# Patient Record
Sex: MALE | Race: WHITE | NOT HISPANIC OR LATINO | Employment: FULL TIME | URBAN - METROPOLITAN AREA
[De-identification: names, ages, dates, MRNs, and addresses within clinical notes are randomized per-mention and may not be internally consistent; named-entity substitution may affect disease eponyms.]

---

## 2017-01-09 ENCOUNTER — HOSPITAL ENCOUNTER (OUTPATIENT)
Dept: ORTHOPEDIC SURGERY | Facility: CLINIC | Age: 57
Discharge: HOME | End: 2017-01-09
Attending: PODIATRIST | Admitting: PODIATRIST

## 2017-01-16 ENCOUNTER — HOSPITAL ENCOUNTER (OUTPATIENT)
Dept: LAB | Facility: HOSPITAL | Age: 57
Setting detail: SPECIMEN
Discharge: HOME | End: 2017-01-16
Attending: NURSE PRACTITIONER | Admitting: NURSE PRACTITIONER

## 2017-01-16 LAB
ALBUMIN SERPL-MCNC: 3.8 G/DL (ref 3.5–4.8)
ALBUMIN/GLOB SERPL: 1.1 {RATIO} (ref 1–1.7)
ALP SERPL-CCNC: 53 IU/L (ref 32–91)
ALT SERPL-CCNC: 24 IU/L (ref 17–63)
ANION GAP SERPL CALC-SCNC: 11.5 MMOL/L (ref 10–20)
AST SERPL-CCNC: 20 IU/L (ref 15–41)
BILIRUB SERPL-MCNC: 0.6 MG/DL (ref 0.3–1.2)
BUN SERPL-MCNC: 18 MG/DL (ref 8–20)
BUN/CREAT SERPL: 18 (ref 6.2–20.3)
CALCIUM SERPL-MCNC: 9.5 MG/DL (ref 8.9–10.3)
CHLORIDE SERPL-SCNC: 103 MMOL/L (ref 101–111)
CHOLEST SERPL-MCNC: 135 MG/DL
CHOLEST/HDLC SERPL: 3.8 {RATIO}
CONV CO2: 27 MMOL/L (ref 22–32)
CONV LDL CHOLESTEROL DIRECT: 70 MG/DL (ref 0–100)
CONV TOTAL PROTEIN: 7.4 G/DL (ref 6.1–7.9)
CREAT UR-MCNC: 1 MG/DL (ref 0.7–1.2)
GLOBULIN UR ELPH-MCNC: 3.6 G/DL (ref 2.5–3.8)
GLUCOSE SERPL-MCNC: 123 MG/DL (ref 65–99)
HDLC SERPL-MCNC: 35 MG/DL
LDLC/HDLC SERPL: 2 {RATIO}
LIPID INTERPRETATION: ABNORMAL
POTASSIUM SERPL-SCNC: 4.5 MMOL/L (ref 3.6–5.1)
SODIUM SERPL-SCNC: 137 MMOL/L (ref 136–144)
TRIGL SERPL-MCNC: 223 MG/DL
VLDLC SERPL CALC-MCNC: 30 MG/DL

## 2017-01-20 ENCOUNTER — CONVERSION ENCOUNTER (OUTPATIENT)
Dept: CARDIOLOGY | Facility: CLINIC | Age: 57
End: 2017-01-20

## 2017-01-27 ENCOUNTER — CONVERSION ENCOUNTER (OUTPATIENT)
Dept: CARDIOLOGY | Facility: CLINIC | Age: 57
End: 2017-01-27

## 2017-02-06 ENCOUNTER — HOSPITAL ENCOUNTER (OUTPATIENT)
Dept: PAIN MEDICINE | Facility: HOSPITAL | Age: 57
Discharge: HOME | End: 2017-02-06
Attending: ANESTHESIOLOGY | Admitting: ANESTHESIOLOGY

## 2017-02-06 LAB
AMPHETAMINES UR QL SCN: NEGATIVE
BZE UR QL SCN: NEGATIVE
CREAT 24H UR-MCNC: NORMAL MG/DL
METHADONE UR QL SCN: NEGATIVE
OPIATE CONFIRMATION URINE: NORMAL
THC SERPLBLD CFM-MCNC: NEGATIVE NG/ML

## 2017-02-10 ENCOUNTER — HOSPITAL ENCOUNTER (OUTPATIENT)
Dept: FAMILY MEDICINE CLINIC | Facility: CLINIC | Age: 57
Setting detail: SPECIMEN
Discharge: HOME | End: 2017-02-10
Attending: PREVENTIVE MEDICINE | Admitting: PREVENTIVE MEDICINE

## 2017-02-10 LAB
BASOPHILS # BLD AUTO: 0.2 10*3/UL (ref 0–0.2)
BASOPHILS NFR BLD AUTO: 1 % (ref 0–2)
DIFFERENTIAL METHOD BLD: (no result)
EOSINOPHIL # BLD AUTO: 0.1 10*3/UL (ref 0–0.3)
EOSINOPHIL # BLD AUTO: 1 % (ref 0–3)
ERYTHROCYTE [DISTWIDTH] IN BLOOD BY AUTOMATED COUNT: 13.1 % (ref 11.5–14.5)
HCT VFR BLD AUTO: 43.7 % (ref 40–54)
HGB BLD-MCNC: 14.5 G/DL (ref 14–18)
LYMPHOCYTES # BLD AUTO: 6.1 10*3/UL (ref 0.8–4.8)
LYMPHOCYTES NFR BLD AUTO: 34 % (ref 18–42)
MCH RBC QN AUTO: 31.2 PG (ref 26–32)
MCHC RBC AUTO-ENTMCNC: 33.1 G/DL (ref 32–36)
MCV RBC AUTO: 94.5 FL (ref 80–94)
MONOCYTES # BLD AUTO: 2.3 10*3/UL (ref 0.1–1.3)
MONOCYTES NFR BLD AUTO: 13 % (ref 2–11)
NEUTROPHILS # BLD AUTO: 9.4 10*3/UL (ref 2.3–8.6)
NEUTROPHILS NFR BLD AUTO: 51 % (ref 50–75)
NRBC BLD AUTO-RTO: 0 /100{WBCS}
NRBC/RBC NFR BLD MANUAL: 0 10*3/UL
PLATELET # BLD AUTO: 402 10*3/UL (ref 150–450)
PMV BLD AUTO: 7.5 FL (ref 7.4–10.4)
RBC # BLD AUTO: 4.63 10*6/UL (ref 4.6–6)
WBC # BLD AUTO: 18.2 10*3/UL (ref 4.5–11.5)

## 2017-02-13 ENCOUNTER — HOSPITAL ENCOUNTER (OUTPATIENT)
Dept: SLEEP MEDICINE | Facility: HOSPITAL | Age: 57
Discharge: HOME | End: 2017-02-13
Attending: PSYCHIATRY & NEUROLOGY | Admitting: PSYCHIATRY & NEUROLOGY

## 2017-02-20 ENCOUNTER — HOSPITAL ENCOUNTER (OUTPATIENT)
Dept: SLEEP MEDICINE | Facility: HOSPITAL | Age: 57
Discharge: HOME | End: 2017-02-20
Attending: PSYCHIATRY & NEUROLOGY | Admitting: PSYCHIATRY & NEUROLOGY

## 2017-02-28 ENCOUNTER — HOSPITAL ENCOUNTER (OUTPATIENT)
Dept: ONCOLOGY | Facility: CLINIC | Age: 57
Setting detail: SPECIMEN
Discharge: HOME | End: 2017-02-28
Attending: INTERNAL MEDICINE | Admitting: INTERNAL MEDICINE

## 2017-02-28 LAB
BASOPHILS # BLD AUTO: 0.2 10*3/UL (ref 0–0.2)
BASOPHILS NFR BLD AUTO: 1 % (ref 0–2)
DIFFERENTIAL METHOD BLD: (no result)
EOSINOPHIL # BLD AUTO: 0.4 10*3/UL (ref 0–0.3)
EOSINOPHIL # BLD AUTO: 3 % (ref 0–3)
ERYTHROCYTE [DISTWIDTH] IN BLOOD BY AUTOMATED COUNT: 13.6 % (ref 11.5–14.5)
HCT VFR BLD AUTO: 41.1 % (ref 40–54)
HGB BLD-MCNC: 13.7 G/DL (ref 14–18)
LYMPHOCYTES # BLD AUTO: 5.8 10*3/UL (ref 0.8–4.8)
LYMPHOCYTES NFR BLD AUTO: 37 % (ref 18–42)
MCH RBC QN AUTO: 31.6 PG (ref 26–32)
MCHC RBC AUTO-ENTMCNC: 33.4 G/DL (ref 32–36)
MCV RBC AUTO: 94.5 FL (ref 80–94)
MONOCYTES # BLD AUTO: 1.8 10*3/UL (ref 0.1–1.3)
MONOCYTES NFR BLD AUTO: 12 % (ref 2–11)
NEUTROPHILS # BLD AUTO: 7.4 10*3/UL (ref 2.3–8.6)
NEUTROPHILS NFR BLD AUTO: 47 % (ref 50–75)
NRBC BLD AUTO-RTO: 0 /100{WBCS}
NRBC/RBC NFR BLD MANUAL: 0 10*3/UL
PLATELET # BLD AUTO: 425 10*3/UL (ref 150–450)
PMV BLD AUTO: 8.3 FL (ref 7.4–10.4)
RBC # BLD AUTO: 4.35 10*6/UL (ref 4.6–6)
WBC # BLD AUTO: 15.5 10*3/UL (ref 4.5–11.5)

## 2017-03-01 ENCOUNTER — HOSPITAL ENCOUNTER (OUTPATIENT)
Dept: ONCOLOGY | Facility: CLINIC | Age: 57
Discharge: HOME | End: 2017-03-01
Attending: INTERNAL MEDICINE | Admitting: INTERNAL MEDICINE

## 2017-03-06 ENCOUNTER — HOSPITAL ENCOUNTER (OUTPATIENT)
Dept: PAIN MEDICINE | Facility: HOSPITAL | Age: 57
Discharge: HOME | End: 2017-03-06
Attending: ANESTHESIOLOGY | Admitting: ANESTHESIOLOGY

## 2017-03-09 ENCOUNTER — HOSPITAL ENCOUNTER (OUTPATIENT)
Dept: LAB | Facility: HOSPITAL | Age: 57
Discharge: HOME | End: 2017-03-09
Attending: INTERNAL MEDICINE | Admitting: INTERNAL MEDICINE

## 2017-03-09 LAB
LDH SERPL-CCNC: 178 IU/L (ref 98–192)
URATE SERPL-MCNC: 3.8 MG/DL (ref 4.8–8.7)
VIT B12 SERPL-MCNC: 541 PG/ML (ref 180–914)

## 2017-03-10 LAB — LYMPHOMA - CLL SCREEN PNL SPEC FC: NORMAL

## 2017-03-13 ENCOUNTER — HOSPITAL ENCOUNTER (OUTPATIENT)
Dept: ONCOLOGY | Facility: CLINIC | Age: 57
Discharge: HOME | End: 2017-03-13
Attending: INTERNAL MEDICINE | Admitting: INTERNAL MEDICINE

## 2017-03-13 LAB — PSA SERPL-MCNC: 0.6 NG/ML (ref 0–4)

## 2017-03-14 LAB
BCR/ABL1 KINASE DOMAIN MUT ANL BLD/T: NORMAL
JAK2 P.V617F BLD/T QL: NORMAL

## 2017-04-17 ENCOUNTER — HOSPITAL ENCOUNTER (OUTPATIENT)
Dept: LAB | Facility: HOSPITAL | Age: 57
Setting detail: SPECIMEN
Discharge: HOME | End: 2017-04-17
Attending: INTERNAL MEDICINE | Admitting: INTERNAL MEDICINE

## 2017-04-18 ENCOUNTER — HOSPITAL ENCOUNTER (OUTPATIENT)
Dept: LAB | Facility: HOSPITAL | Age: 57
Setting detail: SPECIMEN
Discharge: HOME | End: 2017-04-18
Attending: INTERNAL MEDICINE | Admitting: INTERNAL MEDICINE

## 2017-04-18 LAB
ALBUMIN SERPL-MCNC: 3.5 G/DL (ref 3.5–4.8)
ALBUMIN/GLOB SERPL: 1.1 {RATIO} (ref 1–1.7)
ALP SERPL-CCNC: 67 IU/L (ref 32–91)
ALT SERPL-CCNC: 44 IU/L (ref 17–63)
ANION GAP SERPL CALC-SCNC: 12.9 MMOL/L (ref 10–20)
AST SERPL-CCNC: 39 IU/L (ref 15–41)
BILIRUB SERPL-MCNC: 0.6 MG/DL (ref 0.3–1.2)
BUN SERPL-MCNC: 13 MG/DL (ref 8–20)
BUN/CREAT SERPL: 18.6 (ref 6.2–20.3)
CALCIUM SERPL-MCNC: 9.4 MG/DL (ref 8.9–10.3)
CHLORIDE SERPL-SCNC: 105 MMOL/L (ref 101–111)
CHOLEST SERPL-MCNC: 102 MG/DL
CHOLEST/HDLC SERPL: 4.3 {RATIO}
CONV CO2: 26 MMOL/L (ref 22–32)
CONV LDL CHOLESTEROL DIRECT: 35 MG/DL (ref 0–100)
CONV TOTAL PROTEIN: 6.6 G/DL (ref 6.1–7.9)
CREAT UR-MCNC: 0.7 MG/DL (ref 0.7–1.2)
GLOBULIN UR ELPH-MCNC: 3.1 G/DL (ref 2.5–3.8)
GLUCOSE SERPL-MCNC: 211 MG/DL (ref 65–99)
HDLC SERPL-MCNC: 24 MG/DL
LDLC/HDLC SERPL: 1.5 {RATIO}
LIPID INTERPRETATION: ABNORMAL
POTASSIUM SERPL-SCNC: 3.9 MMOL/L (ref 3.6–5.1)
SODIUM SERPL-SCNC: 140 MMOL/L (ref 136–144)
TRIGL SERPL-MCNC: 345 MG/DL
VLDLC SERPL CALC-MCNC: 43.4 MG/DL

## 2017-07-20 ENCOUNTER — HOSPITAL ENCOUNTER (OUTPATIENT)
Dept: LAB | Facility: HOSPITAL | Age: 57
Setting detail: SPECIMEN
Discharge: HOME | End: 2017-07-20
Attending: INTERNAL MEDICINE | Admitting: INTERNAL MEDICINE

## 2017-07-20 LAB
ALBUMIN SERPL-MCNC: 4 G/DL (ref 3.5–4.8)
ALBUMIN/GLOB SERPL: 1.1 {RATIO} (ref 1–1.7)
ALP SERPL-CCNC: 72 IU/L (ref 32–91)
ALT SERPL-CCNC: 29 IU/L (ref 17–63)
ANION GAP SERPL CALC-SCNC: 12.3 MMOL/L (ref 10–20)
AST SERPL-CCNC: 21 IU/L (ref 15–41)
BILIRUB SERPL-MCNC: 0.7 MG/DL (ref 0.3–1.2)
BUN SERPL-MCNC: 11 MG/DL (ref 8–20)
BUN/CREAT SERPL: 12.2 (ref 6.2–20.3)
CALCIUM SERPL-MCNC: 9.6 MG/DL (ref 8.9–10.3)
CHLORIDE SERPL-SCNC: 104 MMOL/L (ref 101–111)
CHOLEST SERPL-MCNC: 110 MG/DL
CHOLEST/HDLC SERPL: 3.8 {RATIO}
CONV CO2: 24 MMOL/L (ref 22–32)
CONV LDL CHOLESTEROL DIRECT: 49 MG/DL (ref 0–100)
CONV MICROALBUM.,U,RANDOM: 91 MG/L
CONV TOTAL PROTEIN: 7.7 G/DL (ref 6.1–7.9)
CREAT 24H UR-MCNC: 65.8 MG/DL
CREAT UR-MCNC: 0.9 MG/DL (ref 0.7–1.2)
GLOBULIN UR ELPH-MCNC: 3.7 G/DL (ref 2.5–3.8)
GLUCOSE SERPL-MCNC: 187 MG/DL (ref 65–99)
HDLC SERPL-MCNC: 29 MG/DL
LDLC/HDLC SERPL: 1.7 {RATIO}
LIPID INTERPRETATION: ABNORMAL
MICROALBUMIN/CREAT UR: 138.3 UG/MG
POTASSIUM SERPL-SCNC: 4.3 MMOL/L (ref 3.6–5.1)
SODIUM SERPL-SCNC: 136 MMOL/L (ref 136–144)
TRIGL SERPL-MCNC: 224 MG/DL
VLDLC SERPL CALC-MCNC: 31.5 MG/DL

## 2017-09-11 ENCOUNTER — HOSPITAL ENCOUNTER (OUTPATIENT)
Dept: ONCOLOGY | Facility: HOSPITAL | Age: 57
Discharge: HOME | End: 2017-09-11
Attending: INTERNAL MEDICINE | Admitting: INTERNAL MEDICINE

## 2017-09-11 ENCOUNTER — HOSPITAL ENCOUNTER (OUTPATIENT)
Dept: ONCOLOGY | Facility: CLINIC | Age: 57
Setting detail: INFUSION SERIES
Discharge: HOME | End: 2017-09-11
Attending: INTERNAL MEDICINE | Admitting: INTERNAL MEDICINE

## 2017-09-11 ENCOUNTER — CLINICAL SUPPORT (OUTPATIENT)
Dept: ONCOLOGY | Facility: HOSPITAL | Age: 57
End: 2017-09-11

## 2017-09-11 NOTE — PROGRESS NOTES
PATIENTS ONCOLOGY RECORD LOCATED IN [facility]      Subjective     Name:  LOWELL GILL     Date:  2017  Address:  [address] [place] [address] [place] [address]  Home: [phone]  :  1960 AGE:  56 y.o.        RECORDS OBTAINED:  Patients Oncology Record is located in [facility]

## 2017-11-09 ENCOUNTER — HOSPITAL ENCOUNTER (OUTPATIENT)
Dept: FAMILY MEDICINE CLINIC | Facility: CLINIC | Age: 57
Setting detail: SPECIMEN
Discharge: HOME | End: 2017-11-09
Attending: PREVENTIVE MEDICINE | Admitting: PREVENTIVE MEDICINE

## 2017-11-09 LAB
ALBUMIN SERPL-MCNC: 4.1 G/DL (ref 3.5–4.8)
ALBUMIN/GLOB SERPL: 1.1 {RATIO} (ref 1–1.7)
ALP SERPL-CCNC: 68 IU/L (ref 32–91)
ALT SERPL-CCNC: 46 IU/L (ref 17–63)
ANION GAP SERPL CALC-SCNC: 12.5 MMOL/L (ref 10–20)
AST SERPL-CCNC: 48 IU/L (ref 15–41)
BASOPHILS # BLD AUTO: 0.1 10*3/UL (ref 0–0.2)
BASOPHILS NFR BLD AUTO: 1 % (ref 0–2)
BILIRUB SERPL-MCNC: 1.4 MG/DL (ref 0.3–1.2)
BUN SERPL-MCNC: 13 MG/DL (ref 8–20)
BUN/CREAT SERPL: 14.4 (ref 6.2–20.3)
CALCIUM SERPL-MCNC: 9 MG/DL (ref 8.9–10.3)
CHLORIDE SERPL-SCNC: 101 MMOL/L (ref 101–111)
CHOLEST SERPL-MCNC: 118 MG/DL
CHOLEST/HDLC SERPL: 3.8 {RATIO}
CONV CO2: 25 MMOL/L (ref 22–32)
CONV LDL CHOLESTEROL DIRECT: 55 MG/DL (ref 0–100)
CONV MICROALBUM.,U,RANDOM: 34 MG/L
CONV TOTAL PROTEIN: 7.7 G/DL (ref 6.1–7.9)
CREAT 24H UR-MCNC: 76.2 MG/DL
CREAT UR-MCNC: 0.9 MG/DL (ref 0.7–1.2)
CRP SERPL-MCNC: 0.42 MG/DL (ref 0–0.7)
DIFFERENTIAL METHOD BLD: (no result)
EOSINOPHIL # BLD AUTO: 0.3 10*3/UL (ref 0–0.3)
EOSINOPHIL # BLD AUTO: 2 % (ref 0–3)
ERYTHROCYTE [DISTWIDTH] IN BLOOD BY AUTOMATED COUNT: 13.2 % (ref 11.5–14.5)
ERYTHROCYTE [SEDIMENTATION RATE] IN BLOOD BY WESTERGREN METHOD: 7 MM/HR (ref 0–20)
GLOBULIN UR ELPH-MCNC: 3.6 G/DL (ref 2.5–3.8)
GLUCOSE SERPL-MCNC: 114 MG/DL (ref 65–99)
HCT VFR BLD AUTO: 47.1 % (ref 40–54)
HDLC SERPL-MCNC: 31 MG/DL
HGB BLD-MCNC: 15.6 G/DL (ref 14–18)
LDLC/HDLC SERPL: 1.8 {RATIO}
LIPID INTERPRETATION: ABNORMAL
LYMPHOCYTES # BLD AUTO: 4.5 10*3/UL (ref 0.8–4.8)
LYMPHOCYTES NFR BLD AUTO: 32 % (ref 18–42)
MCH RBC QN AUTO: 31.4 PG (ref 26–32)
MCHC RBC AUTO-ENTMCNC: 33.2 G/DL (ref 32–36)
MCV RBC AUTO: 94.6 FL (ref 80–94)
MICROALBUMIN/CREAT UR: 44.6 UG/MG
MONOCYTES # BLD AUTO: 1.7 10*3/UL (ref 0.1–1.3)
MONOCYTES NFR BLD AUTO: 12 % (ref 2–11)
NEUTROPHILS # BLD AUTO: 7.6 10*3/UL (ref 2.3–8.6)
NEUTROPHILS NFR BLD AUTO: 53 % (ref 50–75)
NRBC BLD AUTO-RTO: 0 /100{WBCS}
NRBC/RBC NFR BLD MANUAL: 0 10*3/UL
PLATELET # BLD AUTO: 387 10*3/UL (ref 150–450)
PMV BLD AUTO: 7.6 FL (ref 7.4–10.4)
POTASSIUM SERPL-SCNC: 4.5 MMOL/L (ref 3.6–5.1)
RBC # BLD AUTO: 4.98 10*6/UL (ref 4.6–6)
SODIUM SERPL-SCNC: 134 MMOL/L (ref 136–144)
TRIGL SERPL-MCNC: 233 MG/DL
TSH SERPL-ACNC: 1.13 UIU/ML (ref 0.34–5.6)
VIT B12 SERPL-MCNC: 385 PG/ML (ref 180–914)
VLDLC SERPL CALC-MCNC: 32.1 MG/DL
WBC # BLD AUTO: 14.2 10*3/UL (ref 4.5–11.5)

## 2017-12-13 ENCOUNTER — HOSPITAL ENCOUNTER (OUTPATIENT)
Dept: URGENT CARE | Facility: CLINIC | Age: 57
Discharge: HOME | End: 2017-12-13
Attending: FAMILY MEDICINE | Admitting: FAMILY MEDICINE

## 2018-01-12 ENCOUNTER — HOSPITAL ENCOUNTER (OUTPATIENT)
Dept: URGENT CARE | Facility: CLINIC | Age: 58
Discharge: HOME | End: 2018-01-12
Attending: FAMILY MEDICINE | Admitting: FAMILY MEDICINE

## 2018-01-23 ENCOUNTER — HOSPITAL ENCOUNTER (OUTPATIENT)
Dept: ONCOLOGY | Facility: CLINIC | Age: 58
Setting detail: INFUSION SERIES
Discharge: HOME | End: 2018-01-23
Attending: INTERNAL MEDICINE | Admitting: INTERNAL MEDICINE

## 2018-01-23 ENCOUNTER — CLINICAL SUPPORT (OUTPATIENT)
Dept: ONCOLOGY | Facility: HOSPITAL | Age: 58
End: 2018-01-23

## 2018-01-23 NOTE — PROGRESS NOTES
PATIENTS ONCOLOGY RECORD LOCATED IN [facility]      Subjective     Name:  LOWELL GILL     Date:  2018  Address:  [address] [place] [address] [place] [address]  Home: [phone]  :  1960 AGE:  57 y.o.        RECORDS OBTAINED:  Patients Oncology Record is located in [facility]

## 2018-02-20 ENCOUNTER — HOSPITAL ENCOUNTER (OUTPATIENT)
Dept: ONCOLOGY | Facility: CLINIC | Age: 58
Setting detail: INFUSION SERIES
Discharge: HOME | End: 2018-02-20
Attending: INTERNAL MEDICINE | Admitting: INTERNAL MEDICINE

## 2018-02-20 ENCOUNTER — CLINICAL SUPPORT (OUTPATIENT)
Dept: ONCOLOGY | Facility: HOSPITAL | Age: 58
End: 2018-02-20

## 2018-02-20 NOTE — PROGRESS NOTES
PATIENTS ONCOLOGY RECORD LOCATED IN [facility] [[email or web address]]      Subjective     Name:  LOWELL GILL     Date:  2018  Address:  [address] [place] [address] [place] [address]  Home: [phone]  :  1960 AGE:  57 y.o.        RECORDS OBTAINED:  Patients Oncology Record is located in [facility] [[email or web address]]

## 2018-02-28 ENCOUNTER — HOSPITAL ENCOUNTER (OUTPATIENT)
Dept: CT IMAGING | Facility: HOSPITAL | Age: 58
Discharge: HOME | End: 2018-02-28
Attending: PREVENTIVE MEDICINE | Admitting: PREVENTIVE MEDICINE

## 2018-06-27 ENCOUNTER — HOSPITAL ENCOUNTER (OUTPATIENT)
Dept: FAMILY MEDICINE CLINIC | Facility: CLINIC | Age: 58
Setting detail: SPECIMEN
Discharge: HOME | End: 2018-06-27
Attending: PREVENTIVE MEDICINE | Admitting: PREVENTIVE MEDICINE

## 2018-06-27 LAB
ALBUMIN SERPL-MCNC: 3.8 G/DL (ref 3.5–4.8)
ALBUMIN/GLOB SERPL: 1.1 {RATIO} (ref 1–1.7)
ALP SERPL-CCNC: 85 IU/L (ref 32–91)
ALT SERPL-CCNC: 33 IU/L (ref 17–63)
ANION GAP SERPL CALC-SCNC: 13 MMOL/L (ref 10–20)
AST SERPL-CCNC: 27 IU/L (ref 15–41)
BASOPHILS # BLD AUTO: 0.1 10*3/UL (ref 0–0.2)
BASOPHILS NFR BLD AUTO: 1 % (ref 0–2)
BILIRUB SERPL-MCNC: 0.7 MG/DL (ref 0.3–1.2)
BUN SERPL-MCNC: 11 MG/DL (ref 8–20)
BUN/CREAT SERPL: 12.2 (ref 6.2–20.3)
CALCIUM SERPL-MCNC: 9.2 MG/DL (ref 8.9–10.3)
CHLORIDE SERPL-SCNC: 101 MMOL/L (ref 101–111)
CHOLEST SERPL-MCNC: 142 MG/DL
CHOLEST/HDLC SERPL: 6.1 {RATIO}
CONV CO2: 22 MMOL/L (ref 22–32)
CONV LDL CHOLESTEROL DIRECT: 47 MG/DL (ref 0–100)
CONV TOTAL PROTEIN: 7.3 G/DL (ref 6.1–7.9)
CREAT UR-MCNC: 0.9 MG/DL (ref 0.7–1.2)
CRP SERPL-MCNC: 0.79 MG/DL (ref 0–0.7)
DIFFERENTIAL METHOD BLD: (no result)
EOSINOPHIL # BLD AUTO: 0.2 10*3/UL (ref 0–0.3)
EOSINOPHIL # BLD AUTO: 1 % (ref 0–3)
ERYTHROCYTE [DISTWIDTH] IN BLOOD BY AUTOMATED COUNT: 13.3 % (ref 11.5–14.5)
ERYTHROCYTE [SEDIMENTATION RATE] IN BLOOD BY WESTERGREN METHOD: 17 MM/HR (ref 0–20)
GLOBULIN UR ELPH-MCNC: 3.5 G/DL (ref 2.5–3.8)
GLUCOSE SERPL-MCNC: 347 MG/DL (ref 65–99)
HCT VFR BLD AUTO: 45.9 % (ref 40–54)
HDLC SERPL-MCNC: 23 MG/DL
HGB BLD-MCNC: 15 G/DL (ref 14–18)
LDLC/HDLC SERPL: 2 {RATIO}
LIPID INTERPRETATION: ABNORMAL
LYMPHOCYTES # BLD AUTO: 4.3 10*3/UL (ref 0.8–4.8)
LYMPHOCYTES NFR BLD AUTO: 31 % (ref 18–42)
MAGNESIUM SERPL-MCNC: 1.8 MG/DL (ref 1.8–2.5)
MCH RBC QN AUTO: 31.1 PG (ref 26–32)
MCHC RBC AUTO-ENTMCNC: 32.7 G/DL (ref 32–36)
MCV RBC AUTO: 95.1 FL (ref 80–94)
MONOCYTES # BLD AUTO: 1.8 10*3/UL (ref 0.1–1.3)
MONOCYTES NFR BLD AUTO: 13 % (ref 2–11)
NEUTROPHILS # BLD AUTO: 7.4 10*3/UL (ref 2.3–8.6)
NEUTROPHILS NFR BLD AUTO: 54 % (ref 50–75)
NRBC BLD AUTO-RTO: 0 /100{WBCS}
NRBC/RBC NFR BLD MANUAL: 0 10*3/UL
PLATELET # BLD AUTO: 374 10*3/UL (ref 150–450)
PMV BLD AUTO: 9 FL (ref 7.4–10.4)
POTASSIUM SERPL-SCNC: 4 MMOL/L (ref 3.6–5.1)
RBC # BLD AUTO: 4.83 10*6/UL (ref 4.6–6)
SODIUM SERPL-SCNC: 132 MMOL/L (ref 136–144)
TRIGL SERPL-MCNC: 500 MG/DL
URATE SERPL-MCNC: 5.4 MG/DL (ref 4.8–8.7)
VLDLC SERPL CALC-MCNC: 71.6 MG/DL
WBC # BLD AUTO: 13.8 10*3/UL (ref 4.5–11.5)

## 2018-06-28 LAB
AMYLASE SERPL-CCNC: 82 U/L (ref 36–128)
LIPASE SERPL-CCNC: 67 U/L (ref 22–51)

## 2018-08-07 ENCOUNTER — HOSPITAL ENCOUNTER (OUTPATIENT)
Dept: FAMILY MEDICINE CLINIC | Facility: CLINIC | Age: 58
Setting detail: SPECIMEN
Discharge: HOME | End: 2018-08-07
Attending: PREVENTIVE MEDICINE | Admitting: PREVENTIVE MEDICINE

## 2018-08-10 ENCOUNTER — HOSPITAL ENCOUNTER (OUTPATIENT)
Dept: FAMILY MEDICINE CLINIC | Facility: CLINIC | Age: 58
Setting detail: SPECIMEN
Discharge: HOME | End: 2018-08-10
Attending: PREVENTIVE MEDICINE | Admitting: PREVENTIVE MEDICINE

## 2018-08-10 LAB
BACTERIA SPEC AEROBE CULT: NORMAL
Lab: NORMAL
MICRO REPORT STATUS: NORMAL
SPECIMEN SOURCE: NORMAL

## 2018-11-09 ENCOUNTER — HOSPITAL ENCOUNTER (OUTPATIENT)
Dept: LAB | Facility: HOSPITAL | Age: 58
Setting detail: SPECIMEN
Discharge: HOME | End: 2018-11-09
Attending: INTERNAL MEDICINE | Admitting: INTERNAL MEDICINE

## 2018-11-09 LAB
ALBUMIN SERPL-MCNC: 4.2 G/DL (ref 3.5–4.8)
ALBUMIN/GLOB SERPL: 1.2 {RATIO} (ref 1–1.7)
ALP SERPL-CCNC: 53 IU/L (ref 32–91)
ALT SERPL-CCNC: 29 IU/L (ref 17–63)
ANION GAP SERPL CALC-SCNC: 11.9 MMOL/L (ref 10–20)
AST SERPL-CCNC: 26 IU/L (ref 15–41)
BILIRUB SERPL-MCNC: 0.7 MG/DL (ref 0.3–1.2)
BUN SERPL-MCNC: 16 MG/DL (ref 8–20)
BUN/CREAT SERPL: 14.5 (ref 6.2–20.3)
CALCIUM SERPL-MCNC: 9.8 MG/DL (ref 8.9–10.3)
CHLORIDE SERPL-SCNC: 105 MMOL/L (ref 101–111)
CHOLEST SERPL-MCNC: 132 MG/DL
CHOLEST/HDLC SERPL: 5.2 {RATIO}
CONV CO2: 25 MMOL/L (ref 22–32)
CONV LDL CHOLESTEROL DIRECT: 76 MG/DL (ref 0–100)
CONV MICROALBUM.,U,RANDOM: 27 MG/L
CONV TOTAL PROTEIN: 7.7 G/DL (ref 6.1–7.9)
CREAT 24H UR-MCNC: 36.8 MG/DL
CREAT UR-MCNC: 1.1 MG/DL (ref 0.7–1.2)
GLOBULIN UR ELPH-MCNC: 3.5 G/DL (ref 2.5–3.8)
GLUCOSE SERPL-MCNC: 191 MG/DL (ref 65–99)
HDLC SERPL-MCNC: 26 MG/DL
LDLC/HDLC SERPL: 3 {RATIO}
LIPID INTERPRETATION: ABNORMAL
MICROALBUMIN/CREAT UR: 73.4 UG/MG
POTASSIUM SERPL-SCNC: 3.9 MMOL/L (ref 3.6–5.1)
SODIUM SERPL-SCNC: 138 MMOL/L (ref 136–144)
TRIGL SERPL-MCNC: 236 MG/DL
VLDLC SERPL CALC-MCNC: 30.4 MG/DL

## 2019-02-08 ENCOUNTER — HOSPITAL ENCOUNTER (OUTPATIENT)
Dept: LAB | Facility: HOSPITAL | Age: 59
Setting detail: SPECIMEN
Discharge: HOME | End: 2019-02-08
Attending: NURSE PRACTITIONER | Admitting: NURSE PRACTITIONER

## 2019-02-08 LAB
ALBUMIN SERPL-MCNC: 4.1 G/DL (ref 3.5–4.8)
ALBUMIN/GLOB SERPL: 1.1 {RATIO} (ref 1–1.7)
ALP SERPL-CCNC: 50 IU/L (ref 32–91)
ALT SERPL-CCNC: 28 IU/L (ref 17–63)
ANION GAP SERPL CALC-SCNC: 12.1 MMOL/L (ref 10–20)
AST SERPL-CCNC: 23 IU/L (ref 15–41)
BILIRUB SERPL-MCNC: 0.7 MG/DL (ref 0.3–1.2)
BUN SERPL-MCNC: 16 MG/DL (ref 8–20)
BUN/CREAT SERPL: 16 (ref 6.2–20.3)
CALCIUM SERPL-MCNC: 9.2 MG/DL (ref 8.9–10.3)
CHLORIDE SERPL-SCNC: 102 MMOL/L (ref 101–111)
CONV CO2: 25 MMOL/L (ref 22–32)
CONV TOTAL PROTEIN: 7.9 G/DL (ref 6.1–7.9)
CREAT UR-MCNC: 1 MG/DL (ref 0.7–1.2)
GLOBULIN UR ELPH-MCNC: 3.8 G/DL (ref 2.5–3.8)
GLUCOSE SERPL-MCNC: 206 MG/DL (ref 65–99)
POTASSIUM SERPL-SCNC: 4.1 MMOL/L (ref 3.6–5.1)
SODIUM SERPL-SCNC: 135 MMOL/L (ref 136–144)

## 2019-02-28 ENCOUNTER — HOSPITAL ENCOUNTER (OUTPATIENT)
Dept: PHYSICAL THERAPY | Facility: HOSPITAL | Age: 59
Setting detail: RECURRING SERIES
Discharge: HOME | End: 2019-06-14
Attending: ORTHOPAEDIC SURGERY | Admitting: ORTHOPAEDIC SURGERY

## 2019-05-03 ENCOUNTER — HOSPITAL ENCOUNTER (OUTPATIENT)
Dept: LAB | Facility: HOSPITAL | Age: 59
Setting detail: SPECIMEN
Discharge: HOME | End: 2019-05-03
Attending: INTERNAL MEDICINE | Admitting: INTERNAL MEDICINE

## 2019-05-13 ENCOUNTER — HOSPITAL ENCOUNTER (OUTPATIENT)
Dept: ONCOLOGY | Facility: CLINIC | Age: 59
Setting detail: INFUSION SERIES
Discharge: HOME | End: 2019-05-13
Attending: INTERNAL MEDICINE | Admitting: INTERNAL MEDICINE

## 2019-05-13 ENCOUNTER — CLINICAL SUPPORT (OUTPATIENT)
Dept: ONCOLOGY | Facility: HOSPITAL | Age: 59
End: 2019-05-13

## 2019-05-13 NOTE — PROGRESS NOTES
PATIENTS ONCOLOGY RECORD LOCATED IN [facility]      Subjective     Name:  LOWELL GILL     Date:  2019  Address:  [address] [place] [address] [place] [address]  Home: [unfilled]  :  1960 AGE:  58 y.o.        RECORDS OBTAINED:  Patients Oncology Record is located in [facility]

## 2019-06-04 VITALS
DIASTOLIC BLOOD PRESSURE: 75 MMHG | HEART RATE: 82 BPM | SYSTOLIC BLOOD PRESSURE: 142 MMHG | DIASTOLIC BLOOD PRESSURE: 72 MMHG | HEART RATE: 70 BPM | WEIGHT: 221.5 LBS | SYSTOLIC BLOOD PRESSURE: 158 MMHG | WEIGHT: 219.38 LBS

## 2019-06-21 RX ORDER — LOSARTAN POTASSIUM 100 MG/1
TABLET ORAL
Qty: 90 TABLET | Refills: 1 | Status: SHIPPED | OUTPATIENT
Start: 2019-06-21 | End: 2019-07-22

## 2019-06-21 RX ORDER — LOSARTAN POTASSIUM 100 MG/1
TABLET ORAL EVERY 24 HOURS
COMMUNITY
Start: 2014-12-13 | End: 2019-06-21 | Stop reason: SDUPTHER

## 2019-07-02 PROBLEM — I25.119 CORONARY ARTERY DISEASE WITH ANGINA PECTORIS (HCC): Status: ACTIVE | Noted: 2019-07-02

## 2019-07-02 PROBLEM — I10 ESSENTIAL HYPERTENSION: Status: ACTIVE | Noted: 2019-07-02

## 2019-07-02 PROBLEM — R06.02 SOB (SHORTNESS OF BREATH): Status: ACTIVE | Noted: 2019-07-02

## 2019-07-02 PROBLEM — R07.9 CHEST PAIN: Status: ACTIVE | Noted: 2019-07-02

## 2019-07-22 ENCOUNTER — APPOINTMENT (OUTPATIENT)
Dept: GENERAL RADIOLOGY | Facility: HOSPITAL | Age: 59
End: 2019-07-22

## 2019-07-22 ENCOUNTER — HOSPITAL ENCOUNTER (OUTPATIENT)
Facility: HOSPITAL | Age: 59
Setting detail: OBSERVATION
Discharge: HOME | End: 2019-07-23
Attending: EMERGENCY MEDICINE | Admitting: INTERNAL MEDICINE

## 2019-07-22 DIAGNOSIS — J18.9 PNEUMONIA OF RIGHT LOWER LOBE DUE TO INFECTIOUS ORGANISM: Primary | ICD-10-CM

## 2019-07-22 DIAGNOSIS — R09.02 HYPOXEMIA: ICD-10-CM

## 2019-07-22 PROBLEM — E78.5 HYPERLIPIDEMIA: Chronic | Status: ACTIVE | Noted: 2019-07-22

## 2019-07-22 PROBLEM — G89.29 CHRONIC BACK PAIN: Chronic | Status: ACTIVE | Noted: 2019-07-22

## 2019-07-22 PROBLEM — M54.9 CHRONIC BACK PAIN: Chronic | Status: ACTIVE | Noted: 2019-07-22

## 2019-07-22 PROBLEM — G47.33 OSA (OBSTRUCTIVE SLEEP APNEA): Chronic | Status: ACTIVE | Noted: 2019-07-22

## 2019-07-22 PROBLEM — E11.9 TYPE 2 DIABETES MELLITUS: Chronic | Status: ACTIVE | Noted: 2019-07-22

## 2019-07-22 PROBLEM — M10.9 GOUT: Chronic | Status: ACTIVE | Noted: 2019-07-22

## 2019-07-22 LAB
BASOPHILS # BLD AUTO: 0.2 10*3/MM3 (ref 0–0.2)
BASOPHILS NFR BLD AUTO: 1.4 % (ref 0–1.5)
D-LACTATE SERPL-SCNC: 1.5 MMOL/L (ref 0.5–2)
DEPRECATED RDW RBC AUTO: 44.2 FL (ref 37–54)
EOSINOPHIL # BLD AUTO: 0.2 10*3/MM3 (ref 0–0.4)
EOSINOPHIL NFR BLD AUTO: 2 % (ref 0.3–6.2)
ERYTHROCYTE [DISTWIDTH] IN BLOOD BY AUTOMATED COUNT: 13.4 % (ref 12.3–15.4)
GLUCOSE BLDC GLUCOMTR-MCNC: 202 MG/DL (ref 70–105)
GLUCOSE BLDC GLUCOMTR-MCNC: 340 MG/DL (ref 70–105)
HCT VFR BLD AUTO: 46 % (ref 37.5–51)
HGB BLD-MCNC: 15.2 G/DL (ref 13–17.7)
L PNEUMO1 AG UR QL IA: NEGATIVE
LYMPHOCYTES # BLD AUTO: 4.1 10*3/MM3 (ref 0.7–3.1)
LYMPHOCYTES NFR BLD AUTO: 35.9 % (ref 19.6–45.3)
MCH RBC QN AUTO: 31.1 PG (ref 26.6–33)
MCHC RBC AUTO-ENTMCNC: 33.1 G/DL (ref 31.5–35.7)
MCV RBC AUTO: 93.9 FL (ref 79–97)
MONOCYTES # BLD AUTO: 2.1 10*3/MM3 (ref 0.1–0.9)
MONOCYTES NFR BLD AUTO: 18.6 % (ref 5–12)
NEUTROPHILS # BLD AUTO: 4.9 10*3/MM3 (ref 1.7–7)
NEUTROPHILS NFR BLD AUTO: 42.1 % (ref 42.7–76)
NRBC BLD AUTO-RTO: 0.3 /100 WBC (ref 0–0.2)
PLATELET # BLD AUTO: 397 10*3/MM3 (ref 140–450)
PMV BLD AUTO: 7.5 FL (ref 6–12)
RBC # BLD AUTO: 4.9 10*6/MM3 (ref 4.14–5.8)
S PNEUM AG SPEC QL LA: NEGATIVE
TROPONIN I SERPL-MCNC: <0.03 NG/ML (ref 0–0.03)
WBC NRBC COR # BLD: 11.5 10*3/MM3 (ref 3.4–10.8)

## 2019-07-22 PROCEDURE — 83605 ASSAY OF LACTIC ACID: CPT

## 2019-07-22 PROCEDURE — G0378 HOSPITAL OBSERVATION PER HR: HCPCS

## 2019-07-22 PROCEDURE — 94640 AIRWAY INHALATION TREATMENT: CPT

## 2019-07-22 PROCEDURE — 25010000002 METHYLPREDNISOLONE PER 125 MG: Performed by: EMERGENCY MEDICINE

## 2019-07-22 PROCEDURE — 87899 AGENT NOS ASSAY W/OPTIC: CPT | Performed by: NURSE PRACTITIONER

## 2019-07-22 PROCEDURE — 85025 COMPLETE CBC W/AUTO DIFF WBC: CPT | Performed by: EMERGENCY MEDICINE

## 2019-07-22 PROCEDURE — 99219 PR INITIAL OBSERVATION CARE/DAY 50 MINUTES: CPT | Performed by: NURSE PRACTITIONER

## 2019-07-22 PROCEDURE — 96365 THER/PROPH/DIAG IV INF INIT: CPT

## 2019-07-22 PROCEDURE — 25010000002 METHYLPREDNISOLONE PER 40 MG: Performed by: NURSE PRACTITIONER

## 2019-07-22 PROCEDURE — 25010000002 CEFTRIAXONE PER 250 MG: Performed by: EMERGENCY MEDICINE

## 2019-07-22 PROCEDURE — 82962 GLUCOSE BLOOD TEST: CPT

## 2019-07-22 PROCEDURE — 94799 UNLISTED PULMONARY SVC/PX: CPT

## 2019-07-22 PROCEDURE — 83036 HEMOGLOBIN GLYCOSYLATED A1C: CPT | Performed by: NURSE PRACTITIONER

## 2019-07-22 PROCEDURE — 96376 TX/PRO/DX INJ SAME DRUG ADON: CPT

## 2019-07-22 PROCEDURE — 63710000001 INSULIN LISPRO (HUMAN) PER 5 UNITS: Performed by: NURSE PRACTITIONER

## 2019-07-22 PROCEDURE — 84484 ASSAY OF TROPONIN QUANT: CPT | Performed by: EMERGENCY MEDICINE

## 2019-07-22 PROCEDURE — 96375 TX/PRO/DX INJ NEW DRUG ADDON: CPT

## 2019-07-22 PROCEDURE — 99284 EMERGENCY DEPT VISIT MOD MDM: CPT

## 2019-07-22 PROCEDURE — 71046 X-RAY EXAM CHEST 2 VIEWS: CPT

## 2019-07-22 PROCEDURE — 87040 BLOOD CULTURE FOR BACTERIA: CPT | Performed by: EMERGENCY MEDICINE

## 2019-07-22 RX ORDER — LOSARTAN POTASSIUM 50 MG/1
100 TABLET ORAL DAILY
Status: DISCONTINUED | OUTPATIENT
Start: 2019-07-23 | End: 2019-07-23 | Stop reason: HOSPADM

## 2019-07-22 RX ORDER — GLYBURIDE 5 MG/1
10 TABLET ORAL 2 TIMES DAILY
Refills: 3 | COMMUNITY
Start: 2019-06-06 | End: 2020-03-31 | Stop reason: SDUPTHER

## 2019-07-22 RX ORDER — IPRATROPIUM BROMIDE AND ALBUTEROL SULFATE 2.5; .5 MG/3ML; MG/3ML
3 SOLUTION RESPIRATORY (INHALATION) ONCE
Status: COMPLETED | OUTPATIENT
Start: 2019-07-22 | End: 2019-07-22

## 2019-07-22 RX ORDER — PANTOPRAZOLE SODIUM 40 MG/1
40 TABLET, DELAYED RELEASE ORAL DAILY
Status: DISCONTINUED | OUTPATIENT
Start: 2019-07-23 | End: 2019-07-23 | Stop reason: HOSPADM

## 2019-07-22 RX ORDER — ONDANSETRON 4 MG/1
4 TABLET, FILM COATED ORAL EVERY 6 HOURS PRN
Status: DISCONTINUED | OUTPATIENT
Start: 2019-07-22 | End: 2019-07-23 | Stop reason: HOSPADM

## 2019-07-22 RX ORDER — LOSARTAN POTASSIUM 50 MG/1
100 TABLET ORAL DAILY
COMMUNITY
End: 2020-01-20

## 2019-07-22 RX ORDER — BISACODYL 5 MG/1
5 TABLET, DELAYED RELEASE ORAL DAILY PRN
Status: DISCONTINUED | OUTPATIENT
Start: 2019-07-22 | End: 2019-07-23 | Stop reason: HOSPADM

## 2019-07-22 RX ORDER — SODIUM CHLORIDE 0.9 % (FLUSH) 0.9 %
3-10 SYRINGE (ML) INJECTION AS NEEDED
Status: DISCONTINUED | OUTPATIENT
Start: 2019-07-22 | End: 2019-07-23 | Stop reason: HOSPADM

## 2019-07-22 RX ORDER — OXYCODONE HYDROCHLORIDE 10 MG/1
10 TABLET ORAL EVERY 6 HOURS PRN
Refills: 0 | COMMUNITY
Start: 2019-05-29 | End: 2020-09-02

## 2019-07-22 RX ORDER — PIOGLITAZONEHYDROCHLORIDE 30 MG/1
15 TABLET ORAL DAILY
Status: DISCONTINUED | OUTPATIENT
Start: 2019-07-23 | End: 2019-07-23 | Stop reason: HOSPADM

## 2019-07-22 RX ORDER — OXYCODONE HYDROCHLORIDE 5 MG/1
10 TABLET ORAL EVERY 6 HOURS PRN
Status: DISCONTINUED | OUTPATIENT
Start: 2019-07-22 | End: 2019-07-23 | Stop reason: HOSPADM

## 2019-07-22 RX ORDER — GLIPIZIDE 5 MG/1
5 TABLET ORAL
Status: DISCONTINUED | OUTPATIENT
Start: 2019-07-23 | End: 2019-07-23 | Stop reason: HOSPADM

## 2019-07-22 RX ORDER — SODIUM CHLORIDE 0.9 % (FLUSH) 0.9 %
3 SYRINGE (ML) INJECTION EVERY 12 HOURS SCHEDULED
Status: DISCONTINUED | OUTPATIENT
Start: 2019-07-22 | End: 2019-07-23 | Stop reason: HOSPADM

## 2019-07-22 RX ORDER — METHYLPREDNISOLONE SODIUM SUCCINATE 40 MG/ML
40 INJECTION, POWDER, LYOPHILIZED, FOR SOLUTION INTRAMUSCULAR; INTRAVENOUS EVERY 6 HOURS
Status: DISCONTINUED | OUTPATIENT
Start: 2019-07-22 | End: 2019-07-23 | Stop reason: HOSPADM

## 2019-07-22 RX ORDER — ATORVASTATIN CALCIUM 20 MG/1
20 TABLET, FILM COATED ORAL DAILY
Refills: 5 | COMMUNITY
Start: 2019-06-21 | End: 2019-12-18

## 2019-07-22 RX ORDER — NICOTINE 21 MG/24HR
1 PATCH, TRANSDERMAL 24 HOURS TRANSDERMAL EVERY 24 HOURS
Status: DISCONTINUED | OUTPATIENT
Start: 2019-07-22 | End: 2019-07-22

## 2019-07-22 RX ORDER — PREGABALIN 100 MG/1
100 CAPSULE ORAL 2 TIMES DAILY PRN
Status: DISCONTINUED | OUTPATIENT
Start: 2019-07-22 | End: 2019-07-23 | Stop reason: HOSPADM

## 2019-07-22 RX ORDER — ATORVASTATIN CALCIUM 20 MG/1
20 TABLET, FILM COATED ORAL DAILY
Status: DISCONTINUED | OUTPATIENT
Start: 2019-07-23 | End: 2019-07-23 | Stop reason: HOSPADM

## 2019-07-22 RX ORDER — EMPAGLIFLOZIN 25 MG/1
25 TABLET, FILM COATED ORAL DAILY
Refills: 6 | COMMUNITY
Start: 2019-06-13 | End: 2020-02-10

## 2019-07-22 RX ORDER — DILTIAZEM HYDROCHLORIDE 240 MG/1
240 CAPSULE, EXTENDED RELEASE ORAL DAILY
Refills: 3 | COMMUNITY
Start: 2019-06-11 | End: 2020-03-16

## 2019-07-22 RX ORDER — AZITHROMYCIN 250 MG/1
250 TABLET, FILM COATED ORAL DAILY
COMMUNITY
Start: 2019-07-20 | End: 2019-07-23 | Stop reason: HOSPADM

## 2019-07-22 RX ORDER — MONTELUKAST SODIUM 10 MG/1
10 TABLET ORAL DAILY
Status: DISCONTINUED | OUTPATIENT
Start: 2019-07-23 | End: 2019-07-23 | Stop reason: HOSPADM

## 2019-07-22 RX ORDER — NICOTINE POLACRILEX 4 MG
15 LOZENGE BUCCAL
Status: DISCONTINUED | OUTPATIENT
Start: 2019-07-22 | End: 2019-07-23 | Stop reason: HOSPADM

## 2019-07-22 RX ORDER — HYDROCHLOROTHIAZIDE 12.5 MG/1
12.5 CAPSULE, GELATIN COATED ORAL DAILY
Refills: 3 | COMMUNITY
Start: 2019-06-25 | End: 2019-07-31 | Stop reason: SDUPTHER

## 2019-07-22 RX ORDER — ACETAMINOPHEN 160 MG/5ML
650 SOLUTION ORAL EVERY 4 HOURS PRN
Status: DISCONTINUED | OUTPATIENT
Start: 2019-07-22 | End: 2019-07-23 | Stop reason: HOSPADM

## 2019-07-22 RX ORDER — METFORMIN HYDROCHLORIDE 500 MG/1
1000 TABLET, EXTENDED RELEASE ORAL 2 TIMES DAILY
Refills: 6 | COMMUNITY
Start: 2019-06-06 | End: 2020-06-22

## 2019-07-22 RX ORDER — DEXTROSE MONOHYDRATE 25 G/50ML
25 INJECTION, SOLUTION INTRAVENOUS
Status: DISCONTINUED | OUTPATIENT
Start: 2019-07-22 | End: 2019-07-23 | Stop reason: HOSPADM

## 2019-07-22 RX ORDER — ONDANSETRON 2 MG/ML
4 INJECTION INTRAMUSCULAR; INTRAVENOUS EVERY 6 HOURS PRN
Status: DISCONTINUED | OUTPATIENT
Start: 2019-07-22 | End: 2019-07-23 | Stop reason: HOSPADM

## 2019-07-22 RX ORDER — METHYLPREDNISOLONE SODIUM SUCCINATE 125 MG/2ML
80 INJECTION, POWDER, LYOPHILIZED, FOR SOLUTION INTRAMUSCULAR; INTRAVENOUS ONCE
Status: COMPLETED | OUTPATIENT
Start: 2019-07-22 | End: 2019-07-22

## 2019-07-22 RX ORDER — MONTELUKAST SODIUM 10 MG/1
10 TABLET ORAL DAILY
Refills: 5 | COMMUNITY
Start: 2019-06-06 | End: 2019-08-21

## 2019-07-22 RX ORDER — PIOGLITAZONEHYDROCHLORIDE 15 MG/1
15 TABLET ORAL DAILY
Refills: 5 | COMMUNITY
Start: 2019-06-21 | End: 2020-01-20

## 2019-07-22 RX ORDER — BISACODYL 10 MG
10 SUPPOSITORY, RECTAL RECTAL DAILY PRN
Status: DISCONTINUED | OUTPATIENT
Start: 2019-07-22 | End: 2019-07-23 | Stop reason: HOSPADM

## 2019-07-22 RX ORDER — HYDROCHLOROTHIAZIDE 12.5 MG/1
12.5 TABLET ORAL DAILY
Status: DISCONTINUED | OUTPATIENT
Start: 2019-07-23 | End: 2019-07-23 | Stop reason: HOSPADM

## 2019-07-22 RX ORDER — FENOFIBRATE 145 MG/1
145 TABLET, COATED ORAL DAILY
Status: DISCONTINUED | OUTPATIENT
Start: 2019-07-23 | End: 2019-07-23 | Stop reason: HOSPADM

## 2019-07-22 RX ORDER — DILTIAZEM HYDROCHLORIDE 240 MG/1
240 CAPSULE, COATED, EXTENDED RELEASE ORAL DAILY
Status: DISCONTINUED | OUTPATIENT
Start: 2019-07-23 | End: 2019-07-23 | Stop reason: HOSPADM

## 2019-07-22 RX ORDER — ASPIRIN 81 MG/1
81 TABLET ORAL DAILY
Status: DISCONTINUED | OUTPATIENT
Start: 2019-07-23 | End: 2019-07-23 | Stop reason: HOSPADM

## 2019-07-22 RX ORDER — PANTOPRAZOLE SODIUM 40 MG/1
40 TABLET, DELAYED RELEASE ORAL DAILY
Refills: 6 | COMMUNITY
Start: 2019-06-25 | End: 2019-08-21

## 2019-07-22 RX ORDER — FENOFIBRATE 134 MG/1
134 CAPSULE ORAL DAILY
Refills: 3 | COMMUNITY
Start: 2019-06-21 | End: 2019-07-31 | Stop reason: SDUPTHER

## 2019-07-22 RX ORDER — ACETAMINOPHEN 650 MG/1
650 SUPPOSITORY RECTAL EVERY 4 HOURS PRN
Status: DISCONTINUED | OUTPATIENT
Start: 2019-07-22 | End: 2019-07-23 | Stop reason: HOSPADM

## 2019-07-22 RX ORDER — IPRATROPIUM BROMIDE AND ALBUTEROL SULFATE 2.5; .5 MG/3ML; MG/3ML
3 SOLUTION RESPIRATORY (INHALATION)
Status: DISCONTINUED | OUTPATIENT
Start: 2019-07-22 | End: 2019-07-23 | Stop reason: HOSPADM

## 2019-07-22 RX ORDER — ACETAMINOPHEN 325 MG/1
650 TABLET ORAL EVERY 4 HOURS PRN
Status: DISCONTINUED | OUTPATIENT
Start: 2019-07-22 | End: 2019-07-23 | Stop reason: HOSPADM

## 2019-07-22 RX ORDER — ASPIRIN 81 MG/1
81 TABLET ORAL 3 TIMES WEEKLY
COMMUNITY

## 2019-07-22 RX ADMIN — IPRATROPIUM BROMIDE AND ALBUTEROL SULFATE 3 ML: .5; 3 SOLUTION RESPIRATORY (INHALATION) at 13:35

## 2019-07-22 RX ADMIN — INSULIN LISPRO 3 UNITS: 100 INJECTION, SOLUTION INTRAVENOUS; SUBCUTANEOUS at 17:43

## 2019-07-22 RX ADMIN — IPRATROPIUM BROMIDE AND ALBUTEROL SULFATE 3 ML: .5; 3 SOLUTION RESPIRATORY (INHALATION) at 13:29

## 2019-07-22 RX ADMIN — METHYLPREDNISOLONE SODIUM SUCCINATE 40 MG: 40 INJECTION, POWDER, FOR SOLUTION INTRAMUSCULAR; INTRAVENOUS at 18:37

## 2019-07-22 RX ADMIN — Medication 10 ML: at 21:09

## 2019-07-22 RX ADMIN — INSULIN LISPRO 5 UNITS: 100 INJECTION, SOLUTION INTRAVENOUS; SUBCUTANEOUS at 21:06

## 2019-07-22 RX ADMIN — METHYLPREDNISOLONE SODIUM SUCCINATE 80 MG: 125 INJECTION, POWDER, FOR SOLUTION INTRAMUSCULAR; INTRAVENOUS at 13:26

## 2019-07-22 RX ADMIN — IPRATROPIUM BROMIDE AND ALBUTEROL SULFATE 3 ML: .5; 3 SOLUTION RESPIRATORY (INHALATION) at 20:49

## 2019-07-22 RX ADMIN — DOXYCYCLINE 100 MG: 100 INJECTION, POWDER, LYOPHILIZED, FOR SOLUTION INTRAVENOUS at 15:53

## 2019-07-22 RX ADMIN — CEFTRIAXONE SODIUM 1 G: 10 INJECTION, POWDER, FOR SOLUTION INTRAVENOUS at 15:52

## 2019-07-22 RX ADMIN — CEFTRIAXONE SODIUM 1 G: 10 INJECTION, POWDER, FOR SOLUTION INTRAVENOUS at 13:26
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
## 2019-07-22 NOTE — ED PROVIDER NOTES
Subjective   58-year-old complaining of increasing shortness of breath in the last 3 days.  The patient has had fever and chills over the last 36 hours.  He reports that he has had a cough productive of green sputum.  He reports no blood.  He reports no leg pain or swelling.  He denies night sweats or hemoptysis.  He reports that he has had a little nausea but no actual vomiting or diarrhea is been able to tolerate oral liquids without difficulty.  He states he has a history of sleep apnea            Review of Systems   Constitutional: Positive for diaphoresis, fatigue and fever.   HENT: Negative for sinus pressure and sinus pain.    Eyes: Negative for discharge.   Respiratory: Positive for chest tightness, shortness of breath and wheezing. Negative for cough, choking and stridor.    Cardiovascular: Positive for palpitations. Negative for leg swelling.   Gastrointestinal: Negative for abdominal pain.   Genitourinary: Negative for dysuria and urgency.   Musculoskeletal: Positive for back pain.   Neurological: Negative for weakness and headaches.   Hematological: Bruises/bleeds easily.   All other systems reviewed and are negative.      Past Medical History:   Diagnosis Date   • Back pain    • Broken finger     broken middle finger   • Diabetes mellitus, type 2 (CMS/HCC)    • Gout    • Hand lesion    • Heart attack (CMS/HCC) 2017   • Hx of migraines    • Hyperlipidemia    • Hypertension    • MELISSA (obstructive sleep apnea) 2017    treated with cpap in past, npsg 2017, ahi 33    • Voice absence     Losing Voice        Allergies   Allergen Reactions   • Gabapentin Dizziness       Past Surgical History:   Procedure Laterality Date   • ANKLE SURGERY Left 2001   • CARDIAC CATHETERIZATION  2017   • CARPAL TUNNEL RELEASE Bilateral    • CATARACT EXTRACTION  2015   • HERNIA REPAIR     • OTHER SURGICAL HISTORY  2017    MI with Stent 2010, 2017    • SPLENECTOMY         Family History   Problem Relation Age of Onset   • Diabetes  Paternal Grandmother    • Cancer Other        Social History     Socioeconomic History   • Marital status:      Spouse name: Not on file   • Number of children: Not on file   • Years of education: Not on file   • Highest education level: Not on file   Tobacco Use   • Smoking status: Former Smoker   Substance and Sexual Activity   • Alcohol use: No     Frequency: Never   • Drug use: No           Objective   Physical Exam moderate story distress and had O2 saturations in the 88 range on room air  Alert [place] Coma Scale 15   HEENT: Pupils equal and reactive to light. Conjunctivae are not injected. normal tympanic membranes. Oropharynx and nares are normal.   Neck: Supple. Midline trachea. No JVD. No goiter.   Chest: There are extensive wheezes noted bilaterally there is rales identified in the right lower lobe distribution and equal breath sounds bilaterally regular rate and rhythm without murmur or rub.   Abdomen: Positive bowel sounds nontender nondistended. No rebound or peritoneal signs. No CVA tenderness.   Extremities no clubbing cyanosis or edema motor sensory exam is normal the full range of motion is intact   skin: Warm and dry, no rashes or petechia.   Lymphatic: No regional lymphadenopathy. No calf pain, swelling or Kami's sign    Procedures           ED Course          Labs Reviewed   CBC WITH AUTO DIFFERENTIAL - Abnormal; Notable for the following components:       Result Value    WBC 11.50 (*)     Neutrophil % 42.1 (*)     Monocyte % 18.6 (*)     Lymphocytes, Absolute 4.10 (*)     Monocytes, Absolute 2.10 (*)     nRBC 0.3 (*)     All other components within normal limits   TROPONIN (IN-HOUSE) - Normal    Narrative:     Troponin I Reference Range:    0.00-0.03  Negative.  Repeat testing in 4-6 hours if clinically indicated.    0.04-0.29  Suspicious for myocardial injury. Serial measurements and clinical  correlation may be necessary to confirm or exclude diagnosis of acute  coronary syndrome.   Repeat testing in 4-6 hours if indicated.     >0.29 Consistent with myocardial injury.  Recommend clinical and laboratory correlation.     Results my be falsely decreased if patient taking Biotin.    POC LACTATE - Normal   BLOOD CULTURE   BLOOD CULTURE   LACTIC ACID, PLASMA   CBC AND DIFFERENTIAL    Narrative:     The following orders were created for panel order CBC & Differential.  Procedure                               Abnormality         Status                     ---------                               -----------         ------                     CBC Auto Differential[[id number]]        Abnormal            Final result                 Please view results for these tests on the individual orders.     Medications   cefTRIAXone (ROCEPHIN) in SWFI 1 gram/10ml IV PUSH syringe (not administered)   doxycycline (VIBRAMYCIN) 100 mg in sodium chloride 0.9 % 100 mL IVPB (not administered)   ipratropium-albuterol (DUO-NEB) nebulizer solution 3 mL (3 mL Nebulization Given 7/22/19 1335)   methylPREDNISolone sodium succinate (SOLU-Medrol) injection 80 mg (80 mg Intravenous Given 7/22/19 1326)   cefTRIAXone (ROCEPHIN) in SWFI 1 gram/10ml IV PUSH syringe (1 g Intravenous Given 7/22/19 1326)   ipratropium-albuterol (DUO-NEB) nebulizer solution 3 mL (3 mL Nebulization Given 7/22/19 1329)     Xr Chest 2 View    Result Date: 7/22/2019  New medial right basilar right cardiophrenic angle opacity, since December 2017. Correlate clinically for pneumonia. Short-term radiographic follow-up to document resolution is recommended, to exclude presence of underlying mass lesion..  Electronically Signed By-Dr. Tabitha Gallegos MD On:7/22/2019 1:18 PM This report was finalized on [id number] by Dr. Tabitha Gallegos MD.            MDM  Number of Diagnoses or Management Options     Amount and/or Complexity of Data Reviewed  Clinical lab tests: reviewed  Tests in the radiology section of CPT®: reviewed  Obtain history from someone other than the  patient: yes  Review and summarize past medical records: yes  Discuss the patient with other providers: yes  Independent visualization of images, tracings, or specimens: yes    Risk of Complications, Morbidity, and/or Mortality  Presenting problems: high  Diagnostic procedures: high  Management options: high  General comments: The patient felt better with ER therapy.  Cultures were obtained.  Lactic acid was normal.  O2 saturation improved into the low to mid 90s on oxygen supplementation.  The patient was agreeable to hospitalization in fact stated he felt too sick to go home    Patient Progress  Patient progress: improved        Final diagnoses:   Pneumonia of right lower lobe due to infectious organism (CMS/[facility])   Hypoxemia            Brayden Bowie MD  07/22/19 9040

## 2019-07-23 ENCOUNTER — TRANSITIONAL CARE MANAGEMENT TELEPHONE ENCOUNTER (OUTPATIENT)
Dept: FAMILY MEDICINE CLINIC | Facility: CLINIC | Age: 59
End: 2019-07-23

## 2019-07-23 VITALS
RESPIRATION RATE: 18 BRPM | OXYGEN SATURATION: 95 % | WEIGHT: 210.54 LBS | SYSTOLIC BLOOD PRESSURE: 126 MMHG | HEART RATE: 76 BPM | DIASTOLIC BLOOD PRESSURE: 73 MMHG | BODY MASS INDEX: 33.84 KG/M2 | HEIGHT: 66 IN | TEMPERATURE: 97.4 F

## 2019-07-23 LAB
ANION GAP SERPL CALCULATED.3IONS-SCNC: 17.3 MMOL/L (ref 5–15)
BASOPHILS # BLD AUTO: 0 10*3/MM3 (ref 0–0.2)
BASOPHILS NFR BLD AUTO: 0.1 % (ref 0–1.5)
BUN BLD-MCNC: 22 MG/DL (ref 8–20)
BUN/CREAT SERPL: 22 (ref 6.2–20.3)
CALCIUM SPEC-SCNC: 9.4 MG/DL (ref 8.9–10.3)
CHLORIDE SERPL-SCNC: 101 MMOL/L (ref 101–111)
CO2 SERPL-SCNC: 21 MMOL/L (ref 22–32)
CREAT BLD-MCNC: 1 MG/DL (ref 0.7–1.2)
DEPRECATED RDW RBC AUTO: 45.1 FL (ref 37–54)
EOSINOPHIL # BLD AUTO: 0 10*3/MM3 (ref 0–0.4)
EOSINOPHIL NFR BLD AUTO: 0 % (ref 0.3–6.2)
ERYTHROCYTE [DISTWIDTH] IN BLOOD BY AUTOMATED COUNT: 13.4 % (ref 12.3–15.4)
GFR SERPL CREATININE-BSD FRML MDRD: 77 ML/MIN/1.73
GLUCOSE BLD-MCNC: 181 MG/DL (ref 65–99)
GLUCOSE BLDC GLUCOMTR-MCNC: 191 MG/DL (ref 70–105)
GLUCOSE BLDC GLUCOMTR-MCNC: 340 MG/DL (ref 70–105)
HBA1C MFR BLD: 10.3 % (ref 3.5–5.6)
HCT VFR BLD AUTO: 44.1 % (ref 37.5–51)
HGB BLD-MCNC: 14.6 G/DL (ref 13–17.7)
LYMPHOCYTES # BLD AUTO: 2.3 10*3/MM3 (ref 0.7–3.1)
LYMPHOCYTES NFR BLD AUTO: 20.1 % (ref 19.6–45.3)
MCH RBC QN AUTO: 31.4 PG (ref 26.6–33)
MCHC RBC AUTO-ENTMCNC: 33.1 G/DL (ref 31.5–35.7)
MCV RBC AUTO: 95.1 FL (ref 79–97)
MONOCYTES # BLD AUTO: 0.3 10*3/MM3 (ref 0.1–0.9)
MONOCYTES NFR BLD AUTO: 2.6 % (ref 5–12)
NEUTROPHILS # BLD AUTO: 9 10*3/MM3 (ref 1.7–7)
NEUTROPHILS NFR BLD AUTO: 77.2 % (ref 42.7–76)
NRBC BLD AUTO-RTO: 0 /100 WBC (ref 0–0.2)
PLATELET # BLD AUTO: 411 10*3/MM3 (ref 140–450)
PMV BLD AUTO: 7.9 FL (ref 6–12)
POTASSIUM BLD-SCNC: 4.3 MMOL/L (ref 3.6–5.1)
PROCALCITONIN SERPL-MCNC: 0.1 NG/ML (ref 0–0.5)
RBC # BLD AUTO: 4.63 10*6/MM3 (ref 4.14–5.8)
SODIUM BLD-SCNC: 135 MMOL/L (ref 136–144)
WBC NRBC COR # BLD: 11.7 10*3/MM3 (ref 3.4–10.8)

## 2019-07-23 PROCEDURE — 94799 UNLISTED PULMONARY SVC/PX: CPT

## 2019-07-23 PROCEDURE — 63710000001 INSULIN LISPRO (HUMAN) PER 5 UNITS: Performed by: NURSE PRACTITIONER

## 2019-07-23 PROCEDURE — 25010000002 METHYLPREDNISOLONE PER 40 MG: Performed by: NURSE PRACTITIONER

## 2019-07-23 PROCEDURE — 80048 BASIC METABOLIC PNL TOTAL CA: CPT | Performed by: NURSE PRACTITIONER

## 2019-07-23 PROCEDURE — 85025 COMPLETE CBC W/AUTO DIFF WBC: CPT | Performed by: NURSE PRACTITIONER

## 2019-07-23 PROCEDURE — 63710000001 INSULIN REGULAR HUMAN PER 5 UNITS: Performed by: INTERNAL MEDICINE

## 2019-07-23 PROCEDURE — 96366 THER/PROPH/DIAG IV INF ADDON: CPT

## 2019-07-23 PROCEDURE — 96376 TX/PRO/DX INJ SAME DRUG ADON: CPT

## 2019-07-23 PROCEDURE — G0378 HOSPITAL OBSERVATION PER HR: HCPCS

## 2019-07-23 PROCEDURE — 99217 PR OBSERVATION CARE DISCHARGE MANAGEMENT: CPT | Performed by: INTERNAL MEDICINE

## 2019-07-23 PROCEDURE — 82962 GLUCOSE BLOOD TEST: CPT

## 2019-07-23 PROCEDURE — 84145 PROCALCITONIN (PCT): CPT | Performed by: NURSE PRACTITIONER

## 2019-07-23 RX ORDER — CEFDINIR 300 MG/1
300 CAPSULE ORAL EVERY 12 HOURS SCHEDULED
Status: DISCONTINUED | OUTPATIENT
Start: 2019-07-23 | End: 2019-07-23 | Stop reason: HOSPADM

## 2019-07-23 RX ORDER — CEFDINIR 300 MG/1
300 CAPSULE ORAL EVERY 12 HOURS SCHEDULED
Qty: 12 CAPSULE | Refills: 0 | Status: SHIPPED | OUTPATIENT
Start: 2019-07-23 | End: 2019-07-29 | Stop reason: SDUPTHER

## 2019-07-23 RX ORDER — DOXYCYCLINE HYCLATE 100 MG/1
100 TABLET, DELAYED RELEASE ORAL 2 TIMES DAILY
Qty: 12 TABLET | Refills: 0 | Status: SHIPPED | OUTPATIENT
Start: 2019-07-23 | End: 2019-07-29 | Stop reason: SDUPTHER

## 2019-07-23 RX ADMIN — METHYLPREDNISOLONE SODIUM SUCCINATE 40 MG: 40 INJECTION, POWDER, FOR SOLUTION INTRAMUSCULAR; INTRAVENOUS at 06:11

## 2019-07-23 RX ADMIN — ATORVASTATIN CALCIUM 20 MG: 20 TABLET, FILM COATED ORAL at 08:35

## 2019-07-23 RX ADMIN — DILTIAZEM HYDROCHLORIDE 240 MG: 240 CAPSULE, COATED, EXTENDED RELEASE ORAL at 08:35

## 2019-07-23 RX ADMIN — INSULIN HUMAN 2 UNITS: 100 INJECTION, SOLUTION PARENTERAL at 12:13

## 2019-07-23 RX ADMIN — GLIPIZIDE 5 MG: 5 TABLET ORAL at 08:35

## 2019-07-23 RX ADMIN — FENOFIBRATE 145 MG: 145 TABLET ORAL at 08:35

## 2019-07-23 RX ADMIN — LOSARTAN POTASSIUM 100 MG: 50 TABLET ORAL at 08:35

## 2019-07-23 RX ADMIN — MONTELUKAST SODIUM 10 MG: 10 TABLET, COATED ORAL at 08:35

## 2019-07-23 RX ADMIN — INSULIN LISPRO 5 UNITS: 100 INJECTION, SOLUTION INTRAVENOUS; SUBCUTANEOUS at 12:13

## 2019-07-23 RX ADMIN — HYDROCHLOROTHIAZIDE 12.5 MG: 12.5 TABLET ORAL at 08:35

## 2019-07-23 RX ADMIN — INSULIN LISPRO 2 UNITS: 100 INJECTION, SOLUTION INTRAVENOUS; SUBCUTANEOUS at 08:34

## 2019-07-23 RX ADMIN — PANTOPRAZOLE SODIUM 40 MG: 40 TABLET, DELAYED RELEASE ORAL at 08:35

## 2019-07-23 RX ADMIN — CEFDINIR 300 MG: 300 CAPSULE ORAL at 12:13

## 2019-07-23 RX ADMIN — METHYLPREDNISOLONE SODIUM SUCCINATE 40 MG: 40 INJECTION, POWDER, FOR SOLUTION INTRAMUSCULAR; INTRAVENOUS at 00:26

## 2019-07-23 RX ADMIN — ASPIRIN 81 MG: 81 TABLET ORAL at 08:35

## 2019-07-23 RX ADMIN — INSULIN HUMAN 2 UNITS: 100 INJECTION, SOLUTION PARENTERAL at 06:10

## 2019-07-23 RX ADMIN — Medication 3 ML: at 08:35

## 2019-07-23 RX ADMIN — PIOGLITAZONE 15 MG: 30 TABLET ORAL at 08:40

## 2019-07-23 RX ADMIN — IPRATROPIUM BROMIDE AND ALBUTEROL SULFATE 3 ML: .5; 3 SOLUTION RESPIRATORY (INHALATION) at 06:59

## 2019-07-23 RX ADMIN — OXYCODONE HYDROCHLORIDE 10 MG: 5 TABLET ORAL at 08:45

## 2019-07-23 RX ADMIN — DOXYCYCLINE 100 MG: 100 INJECTION, POWDER, LYOPHILIZED, FOR SOLUTION INTRAVENOUS at 02:57

## 2019-07-23 NOTE — DISCHARGE SUMMARY
Date of Admission: 7/22/2019    Date of Discharge:  7/23/2019    Length of stay:  LOS: 0 days     Discharge Diagnosis: Right middle lobe PNA    Presenting Problem/History of Present Illness  Active Hospital Problems    Diagnosis  POA   • **Pneumonia of right lower lobe due to infectious organism (CMS/HCC) [J18.1]  Yes   • Type 2 diabetes mellitus (CMS/HCC) [E11.9]  Yes   • Gout [M10.9]  Yes   • MELISSA (obstructive sleep apnea) [G47.33]  Yes   • Hyperlipidemia [E78.5]  Yes   • Chronic back pain [M54.9, G89.29]  Yes   • Coronary artery disease with angina pectoris (CMS/HCC) [I25.119]  Yes   • Essential hypertension [I10]  Yes      Resolved Hospital Problems   No resolved problems to display.          Hospital Course  Patient is a 58 y.o. male presented with pna and responded well to abx and is being discharged on po abx.      Past Medical History:     Past Medical History:   Diagnosis Date   • Back pain    • Broken finger     broken middle finger   • Coronary artery disease with angina pectoris (CMS/HCC) 7/2/2019   • Diabetes mellitus, type 2 (CMS/HCC)    • Gout    • Hand lesion    • Heart attack (CMS/HCC) 2017   • Hx of migraines    • Hyperlipidemia    • Hypertension    • MELISSA (obstructive sleep apnea) 2017    treated with cpap in past, npsg 2017, ahi 33    • Voice absence     Losing Voice        Past Surgical History:     Past Surgical History:   Procedure Laterality Date   • ANKLE SURGERY Left 2001   • CARDIAC CATHETERIZATION  2017   • CARPAL TUNNEL RELEASE Bilateral    • CATARACT EXTRACTION  2015   • HERNIA REPAIR     • OTHER SURGICAL HISTORY  2017    MI with Stent 2010, 2017    • SPLENECTOMY         Social History:   Social History     Socioeconomic History   • Marital status:      Spouse name: Not on file   • Number of children: Not on file   • Years of education: Not on file   • Highest education level: Not on file   Tobacco Use   • Smoking status: Former Smoker   Substance and Sexual Activity   • Alcohol  use: No     Frequency: Never   • Drug use: No       Procedures Performed         Consults:   Consults     Date and Time Order Name Status Description    7/22/2019 1538 Hospitalist (on-call MD unless specified) Completed           Pertinent Test Results:       Lab Results (most recent)     Procedure Component Value Units Date/Time    Procalcitonin [[id number]]  (Normal) Collected:  07/23/19 0616    Specimen:  Blood Updated:  07/23/19 0932     Procalcitonin 0.10 ng/mL     Narrative:       As a Marker for Sepsis (Non-Neonates):   1. <0.5 ng/mL represents a low risk of severe sepsis and/or septic shock.  2. >2 ng/mL represents a high risk of severe sepsis and/or septic shock.    As a Marker for Lower Respiratory Tract Infections that require antibiotic therapy:    PCT on Admission     Antibiotic Therapy       6-12 Hrs later  > 0.5                Strongly Recommended             >0.25 - <0.5         Recommended  0.1 - 0.25           Discouraged              Remeasure/reassess PCT  <0.1                 Strongly Discouraged     Remeasure/reassess PCT                     PCT values of < 0.5 ng/mL do not exclude an infection, because localized infections (without systemic signs) may be associated with such low concentrations, or a systemic infection in its initial stages (< 6 hours). Furthermore, increased PCT can occur without infection. PCT concentrations between 0.5 and 2.0 ng/mL should be interpreted taking into account the patient's history. It is recommended to retest PCT within 6-24 hours if any concentrations < 2 ng/mL are obtained.    Basic Metabolic Panel [[id number]]  (Abnormal) Collected:  07/23/19 0616    Specimen:  Blood Updated:  07/23/19 0746     Glucose 181 mg/dL      BUN 22 mg/dL      Creatinine 1.00 mg/dL      Sodium 135 mmol/L      Potassium 4.3 mmol/L      Chloride 101 mmol/L      CO2 21.0 mmol/L      Calcium 9.4 mg/dL      eGFR Non African Amer 77 mL/min/1.73      BUN/Creatinine Ratio 22.0     Anion Gap  17.3 mmol/L     CBC Auto Differential [[id number]]  (Abnormal) Collected:  07/23/19 0616    Specimen:  Blood Updated:  07/23/19 0732     WBC 11.70 10*3/mm3      RBC 4.63 10*6/mm3      Hemoglobin 14.6 g/dL      Hematocrit 44.1 %      MCV 95.1 fL      MCH 31.4 pg      MCHC 33.1 g/dL      RDW 13.4 %      RDW-SD 45.1 fl      MPV 7.9 fL      Platelets 411 10*3/mm3      Neutrophil % 77.2 %      Lymphocyte % 20.1 %      Monocyte % 2.6 %      Eosinophil % 0.0 %      Basophil % 0.1 %      Neutrophils, Absolute 9.00 10*3/mm3      Lymphocytes, Absolute 2.30 10*3/mm3      Monocytes, Absolute 0.30 10*3/mm3      Eosinophils, Absolute 0.00 10*3/mm3      Basophils, Absolute 0.00 10*3/mm3      nRBC 0.0 /100 WBC     POC Glucose Once [[id number]]  (Abnormal) Collected:  07/23/19 0710    Specimen:  Blood Updated:  07/23/19 0712     Glucose 191 mg/dL      Comment: Serial Number: [id number]:  [id number]       S. Pneumo Ag Urine or CSF - Urine, Urine, Clean Catch [[id number]]  (Normal) Collected:  07/22/19 1943    Specimen:  Urine, Clean Catch Updated:  07/22/19 2110     Strep Pneumo Ag Negative    Legionella Antigen, Urine - Urine, Urine, Clean Catch [[id number]]  (Normal) Collected:  07/22/19 1943    Specimen:  Urine, Clean Catch Updated:  07/22/19 2110     LEGIONELLA ANTIGEN, URINE Negative    POC Glucose Once [[id number]]  (Abnormal) Collected:  07/22/19 2048    Specimen:  Blood Updated:  07/22/19 2050     Glucose 340 mg/dL      Comment: Serial Number: [id number]:  [id number]       Hemoglobin A1c [[id number]] Collected:  07/22/19 1306    Specimen:  Blood Updated:  07/22/19 1647    Troponin [[id number]]  (Normal) Collected:  07/22/19 1306    Specimen:  Blood Updated:  07/22/19 1343     Troponin I <0.030 ng/mL     Narrative:       Troponin I Reference Range:    0.00-0.03  Negative.  Repeat testing in 4-6 hours if clinically indicated.    0.04-0.29  Suspicious for myocardial injury. Serial measurements and clinical  correlation may  be necessary to confirm or exclude diagnosis of acute  coronary syndrome.  Repeat testing in 4-6 hours if indicated.     >0.29 Consistent with myocardial injury.  Recommend clinical and laboratory correlation.     Results my be falsely decreased if patient taking Biotin.     Blood Culture - Blood, Arm, Left [[id number]] Collected:  07/22/19 1318    Specimen:  Blood from Arm, Left Updated:  07/22/19 1325    CBC & Differential [[id number]] Collected:  07/22/19 1306    Specimen:  Blood Updated:  07/22/19 1319    Narrative:       The following orders were created for panel order CBC & Differential.  Procedure                               Abnormality         Status                     ---------                               -----------         ------                     CBC Auto Differential[[id number]]        Abnormal            Final result                 Please view results for these tests on the individual orders.    CBC Auto Differential [[id number]]  (Abnormal) Collected:  07/22/19 1306    Specimen:  Blood Updated:  07/22/19 1319     WBC 11.50 10*3/mm3      RBC 4.90 10*6/mm3      Hemoglobin 15.2 g/dL      Hematocrit 46.0 %      MCV 93.9 fL      MCH 31.1 pg      MCHC 33.1 g/dL      RDW 13.4 %      RDW-SD 44.2 fl      MPV 7.5 fL      Platelets 397 10*3/mm3      Neutrophil % 42.1 %      Lymphocyte % 35.9 %      Monocyte % 18.6 %      Eosinophil % 2.0 %      Basophil % 1.4 %      Neutrophils, Absolute 4.90 10*3/mm3      Lymphocytes, Absolute 4.10 10*3/mm3      Monocytes, Absolute 2.10 10*3/mm3      Eosinophils, Absolute 0.20 10*3/mm3      Basophils, Absolute 0.20 10*3/mm3      nRBC 0.3 /100 WBC     POC Lactate [[id number]]  (Normal) Collected:  07/22/19 1312    Specimen:  Blood Updated:  07/22/19 1314     Lactate 1.5 mmol/L      Comment: Serial Number: [id number]:  [id number]       Blood Culture - Blood, Arm, Right [[id number]] Collected:  07/22/19 1307    Specimen:  Blood from Arm, Right Updated:  07/22/19 1312                   Imaging Results (most recent)     Procedure Component Value Units Date/Time    XR Chest 2 View [[id number]] Collected:  07/22/19 1315     Updated:  07/22/19 1320    Narrative:       DATE OF EXAM:  7/22/2019 1:13 PM     PROCEDURE:  XR CHEST 2 VW-     INDICATIONS:  Fever and shortness of breath     COMPARISON:  PA and lateral chest 12/13/2017.     TECHNIQUE:   Two radiologic views of the chest.     FINDINGS:  There is new opacity in the medial right base obscuring the  cardiophrenic angle, compared to the 12/13/2017 chest comparison.  Minimal chronic appearing scarring in the left lower lobe. No pleural  effusion or pneumothorax. Mild degenerative endplate spurring in the  thoracic spine. Heart size is normal.       Impression:       New medial right basilar right cardiophrenic angle opacity, since  December 2017. Correlate clinically for pneumonia. Short-term  radiographic follow-up to document resolution is recommended, to exclude  presence of underlying mass lesion..     Electronically Signed By-Dr. Tabitha Gallegos MD On:7/22/2019 1:18 PM  This report was finalized on [id number] by Dr. Tabitha Gallegos MD.          Condition on Discharge:  good    Vital Signs  Temp:  [97.4 °F (36.3 °C)-98.5 °F (36.9 °C)] 97.4 °F (36.3 °C)  Heart Rate:  [66-78] 76  Resp:  [17-18] 18  BP: (116-149)/(52-77) 126/73    Physical Exam:     General Appearance:    Alert, cooperative, in no acute distress   Lungs:     Clear to auscultation,respirations regular, even and                  unlabored    Heart:    Regular rhythm and normal rate, normal S1 and S2, no            murmur, no gallop, no rub, no click   Abdomen:     Normal bowel sounds, no masses, no organomegaly, soft        non-tender, non-distended, no guarding, no rebound                tenderness   Extremities:   Moves all extremities well, no edema, no cyanosis, no             redness       Discharge Disposition  home    Discharge Medications     Discharge  Medications      New Medications      Instructions Start Date   cefdinir 300 MG capsule  Commonly known as:  OMNICEF   300 mg, Oral, Every 12 Hours Scheduled      doxycycline 100 MG enteric coated tablet  Commonly known as:  DORYX   100 mg, Oral, 2 Times Daily         Continue These Medications      Instructions Start Date   aspirin 81 MG EC tablet   81 mg, Oral, Daily      atorvastatin 20 MG tablet  Commonly known as:  LIPITOR   20 mg, Oral, Daily      CARTIA  MG 24 hr capsule  Generic drug:  diltiaZEM CD   240 mg, Oral, Daily      fenofibrate micronized 134 MG capsule  Commonly known as:  LOFIBRA   134 mg, Oral, Daily      glyBURIDE 5 MG tablet  Commonly known as:  DIAbeta   10 mg, Oral, 2 Times Daily      hydrochlorothiazide 12.5 MG capsule  Commonly known as:  MICROZIDE   12.5 mg, Oral, Daily      JARDIANCE 25 MG tablet  Generic drug:  Empagliflozin   25 mg, Oral, Daily      losartan 50 MG tablet  Commonly known as:  COZAAR   100 mg, Oral, Daily      LYRICA 100 MG capsule  Generic drug:  pregabalin   100 mg, Oral, 2 Times Daily PRN      metFORMIN  MG 24 hr tablet  Commonly known as:  GLUCOPHAGE-XR   1,000 mg, Oral, 2 Times Daily      montelukast 10 MG tablet  Commonly known as:  SINGULAIR   10 mg, Oral, Daily      oxyCODONE 10 MG tablet  Commonly known as:  ROXICODONE   10 mg, Oral, Every 6 Hours PRN      pantoprazole 40 MG EC tablet  Commonly known as:  PROTONIX   40 mg, Oral, Daily      pioglitazone 15 MG tablet  Commonly known as:  ACTOS   15 mg, Oral, Daily         Stop These Medications    azithromycin 250 MG tablet  Commonly known as:  ZITHROMAX            Discharge Diet: healthy heart      Activity at Discharge: as tolerated      Follow-up Appointments  Future Appointments   Date Time Provider Department Center   8/7/2019  8:40 AM LAB  [facility]  [facility] None   8/14/2019  9:20 AM Katie Bridges APRN [facility] END NA None   8/21/2019  3:30 PM Prashanth Parikh MD [facility] NA P [facility] NA    11/11/2019  3:20 PM Sita Yusuf MD [facility] ONC NA None         Test Results Pending at Discharge   Order Current Status    Lactic Acid, Plasma Collected (07/22/19 1306)    Blood Culture - Blood, Arm, Left In process    Blood Culture - Blood, Arm, Right In process    Hemoglobin A1c In process           Risk for Readmission (LACE) Score: 2 (7/23/2019  6:00 AM)          He Mae Jr., MD  07/23/19  9:40 AM    Time:

## 2019-07-23 NOTE — PLAN OF CARE
Problem: Patient Care Overview  Goal: Plan of Care Review  Outcome: Ongoing (interventions implemented as appropriate)   07/23/19 0457   Coping/Psychosocial   Plan of Care Reviewed With patient;spouse;family   Plan of Care Review   Progress improving   OTHER   Outcome Summary pt is tolerating medications. pt cough is almost non existant tonight. pt rested throughout the night comfortably without oxygen since 0100     Goal: Discharge Needs Assessment   07/23/19 0457   Discharge Needs Assessment   Readmission Within the Last 30 Days no previous admission in last 30 days   Concerns to be Addressed denies needs/concerns at this time   Patient/Family Anticipates Transition to home   Patient/Family Anticipated Services at Transition none   Transportation Concerns car, none   Transportation Anticipated car, drives self   Anticipated Changes Related to Illness none   Equipment Needed After Discharge none   Disability   Equipment Currently Used at Home none       Problem: Pneumonia (Adult)  Goal: Signs and Symptoms of Listed Potential Problems Will be Absent, Minimized or Managed (Pneumonia)  Outcome: Ongoing (interventions implemented as appropriate)   07/23/19 0457   Goal/Outcome Evaluation   Problems Assessed (Pneumonia) infection progression;fluid/electrolyte imbalance;respiratory compromise   Problems Present (Pneumonia) respiratory compromise

## 2019-07-24 ENCOUNTER — READMISSION MANAGEMENT (OUTPATIENT)
Dept: CALL CENTER | Facility: HOSPITAL | Age: 59
End: 2019-07-24

## 2019-07-24 NOTE — OUTREACH NOTE
Prep Survey      Responses   Facility patient discharged from?  [facility]   Is patient eligible?  Yes   Discharge diagnosis  Pneumonia of right lower lobe due    Does the patient have one of the following disease processes/diagnoses(primary or secondary)?  COPD/Pneumonia   Does the patient have Home health ordered?  No   Is there a DME ordered?  No   Prep survey completed?  Yes          Kimmie Forrester RN

## 2019-07-25 ENCOUNTER — READMISSION MANAGEMENT (OUTPATIENT)
Dept: CALL CENTER | Facility: HOSPITAL | Age: 59
End: 2019-07-25

## 2019-07-25 NOTE — OUTREACH NOTE
COPD/PN Week 1 Survey      Responses   Facility patient discharged from?  [facility]   Does the patient have one of the following disease processes/diagnoses(primary or secondary)?  COPD/Pneumonia   Is there a successful TCM telephone encounter documented?  No   Was the primary reason for admission:  Pneumonia   Week 1 attempt successful?  Yes   Call start time  1016   Call end time  1024   Discharge diagnosis  Pneumonia of right lower lobe due    Meds reviewed with patient/caregiver?  Yes   Is the patient having any side effects they believe may be caused by any medication additions or changes?  No   Does the patient have all medications ordered at discharge?  Yes   Is the patient taking all medications as directed (includes completed medication regime)?  Yes   Does the patient have a primary care provider?   Yes   Does the patient have an appointment with their PCP or pulmonologist within 7 days of discharge?  No   Nursing Interventions  Educated patient on importance of making appointment, Urgent appointment needed   Urgent appointment interventions  Facilitated patient appointment   Comments  PATIENT REPORTS THAT HE IS NOT FEELING BETTER, IS HAVING DIFFICULTY BREATHING, AND EXCESSIVE SPUTUM WITH COUGHING. THIS NURSE PLACED CALL TO DR. BERMAN'S OFFICE TO GET PATIENT SAME DAY APPT. VOICEMAIL LEFT WITH .    Has home health visited the patient within 72 hours of discharge?  N/A   Did the patient receive a copy of their discharge instructions?  Yes   Nursing interventions  Reviewed instructions with patient   What is the patient's perception of their health status since discharge?  Same   Nursing Interventions  Nurse provided patient education   Is the patient/caregiver able to teach back the hierarchy of who to call/visit for symptoms/problems? PCP, Specialist, Home health nurse, Urgent Care, ED, 911  Yes   Is the patient/caregiver able to teach back signs and symptoms of worsening condition:  Fever/chills,  Shortness of breath, Chest pain   Is the patient/caregiver able to teach back importance of completing antibiotic course of treatment?  Yes   Week 1 call completed?  Yes   Wrap up additional comments  THIS NURSE AWAITING CALL BACK FROM PCP OFFICE TO DISCUSS PATIENT CURRENT CONDITION AND SCHEDULE SAME DAY APPT.       VOICEMAIL RECEIVED FROM DR. BERMAN'S OFFICE STATING THEY MADE PATIENT AN APPOINTMENT FOR TOMORROW AND INFORMED THE PATIENT OF THIS DATE AND TIME.     Chelsey Whitehead LPN

## 2019-07-26 ENCOUNTER — OFFICE VISIT (OUTPATIENT)
Dept: FAMILY MEDICINE CLINIC | Facility: CLINIC | Age: 59
End: 2019-07-26

## 2019-07-26 VITALS
WEIGHT: 211.2 LBS | SYSTOLIC BLOOD PRESSURE: 143 MMHG | BODY MASS INDEX: 36.06 KG/M2 | HEART RATE: 69 BPM | HEIGHT: 64 IN | TEMPERATURE: 98 F | DIASTOLIC BLOOD PRESSURE: 83 MMHG | OXYGEN SATURATION: 93 %

## 2019-07-26 DIAGNOSIS — I10 ESSENTIAL HYPERTENSION: ICD-10-CM

## 2019-07-26 DIAGNOSIS — Z12.5 SCREENING FOR MALIGNANT NEOPLASM OF PROSTATE: ICD-10-CM

## 2019-07-26 DIAGNOSIS — R06.2 WHEEZING: ICD-10-CM

## 2019-07-26 DIAGNOSIS — E78.5 HYPERLIPIDEMIA, UNSPECIFIED HYPERLIPIDEMIA TYPE: Chronic | ICD-10-CM

## 2019-07-26 DIAGNOSIS — Z11.4 SCREENING FOR HIV (HUMAN IMMUNODEFICIENCY VIRUS): ICD-10-CM

## 2019-07-26 DIAGNOSIS — E11.69 TYPE 2 DIABETES MELLITUS WITH OTHER SPECIFIED COMPLICATION, WITHOUT LONG-TERM CURRENT USE OF INSULIN (HCC): ICD-10-CM

## 2019-07-26 DIAGNOSIS — M10.9 GOUT, UNSPECIFIED CAUSE, UNSPECIFIED CHRONICITY, UNSPECIFIED SITE: Chronic | ICD-10-CM

## 2019-07-26 DIAGNOSIS — J18.9 PNEUMONIA OF RIGHT LOWER LOBE DUE TO INFECTIOUS ORGANISM: Primary | ICD-10-CM

## 2019-07-26 DIAGNOSIS — I25.119 CORONARY ARTERY DISEASE WITH ANGINA PECTORIS, UNSPECIFIED VESSEL OR LESION TYPE, UNSPECIFIED WHETHER NATIVE OR TRANSPLANTED HEART (HCC): ICD-10-CM

## 2019-07-26 DIAGNOSIS — Z91.89 ENCOUNTER FOR HEPATITIS C VIRUS SCREENING TEST FOR HIGH RISK PATIENT: ICD-10-CM

## 2019-07-26 DIAGNOSIS — Z11.59 ENCOUNTER FOR HEPATITIS C VIRUS SCREENING TEST FOR HIGH RISK PATIENT: ICD-10-CM

## 2019-07-26 DIAGNOSIS — Z12.11 SCREENING FOR MALIGNANT NEOPLASM OF COLON: ICD-10-CM

## 2019-07-26 PROBLEM — E11.9 DIABETES MELLITUS, TYPE 2: Status: ACTIVE | Noted: 2019-07-26

## 2019-07-26 PROCEDURE — 94640 AIRWAY INHALATION TREATMENT: CPT | Performed by: PREVENTIVE MEDICINE

## 2019-07-26 PROCEDURE — 99214 OFFICE O/P EST MOD 30 MIN: CPT | Performed by: PREVENTIVE MEDICINE

## 2019-07-26 RX ORDER — IPRATROPIUM BROMIDE AND ALBUTEROL SULFATE 2.5; .5 MG/3ML; MG/3ML
3 SOLUTION RESPIRATORY (INHALATION) EVERY 4 HOURS PRN
Qty: 360 ML | Refills: 3 | Status: SHIPPED | OUTPATIENT
Start: 2019-07-26

## 2019-07-26 RX ORDER — IPRATROPIUM BROMIDE AND ALBUTEROL SULFATE 2.5; .5 MG/3ML; MG/3ML
3 SOLUTION RESPIRATORY (INHALATION) EVERY 4 HOURS PRN
COMMUNITY
End: 2019-07-26 | Stop reason: SDUPTHER

## 2019-07-26 RX ORDER — IPRATROPIUM BROMIDE AND ALBUTEROL SULFATE 2.5; .5 MG/3ML; MG/3ML
3 SOLUTION RESPIRATORY (INHALATION)
Status: DISCONTINUED | OUTPATIENT
Start: 2019-07-26 | End: 2020-10-28

## 2019-07-26 NOTE — PROGRESS NOTES
Transitional Care Follow Up Visit  Subjective     Dawson Lomax is a 58 y.o. male who presents for a transitional care management visit.    Within 48 business hours after discharge our office contacted him via telephone to coordinate his care and needs.      I reviewed and discussed the details of that call along with the discharge summary, hospital problems, inpatient lab results, inpatient diagnostic studies, and consultation reports with Dawson.     Current outpatient and discharge medications have been reconciled for the patient.    Date of TCM Phone Call 7/25/2019 7/25/2019   Hospital [facility]   Date of Admission 7/22/2019 7/22/2019   Date of Discharge 7/23/2019 7/23/2019   Discharge Disposition - Home or Self Care     Risk for Readmission (LACE) Score: 2 (7/23/2019  6:00 AM)    Meds reconciled and patient said SOA with walking short distances.  No fever.  Can't get sputum up.  Sugars below 200.  No nebulizer at home and did receive treatments in hospital   No diarrhea.  Skin infections on legs where weed eating without long pants.  No dysuria, diarrhea or constipation  History of Present Illness   Course During Hospital Stay:  *Godfrey Aragon**     The following portions of the patient's history were reviewed and updated as appropriate: allergies, current medications, past family history, past medical history, past social history, past surgical history and problem list.    Review of Systems   Constitutional: Positive for fatigue.   HENT: Positive for congestion, sinus pressure, sinus pain, sore throat and voice change.    Eyes: Negative.    Respiratory: Positive for cough, shortness of breath and wheezing.    Cardiovascular: Negative.    Gastrointestinal: Negative.    Endocrine: Negative.    Genitourinary: Negative.    Musculoskeletal: Negative.    Skin: Negative.    Allergic/Immunologic: Positive for immunocompromised state.   Neurological: Negative.    Hematological: Negative.    Psychiatric/Behavioral:  Negative.        Objective   Physical Exam   Constitutional: He is oriented to person, place, and time. He appears well-developed and well-nourished.   HENT:   Head: Normocephalic.   Nose: Nose normal.   Mouth/Throat: Oropharynx is clear and moist.   Eyes: Conjunctivae and EOM are normal. Pupils are equal, round, and reactive to light.   Neck: Neck supple.   Cardiovascular: Normal rate, regular rhythm and normal heart sounds.   Pulmonary/Chest:   Rhonchi but no rales or wheezes.     Abdominal: Soft. Bowel sounds are normal. He exhibits no distension and no mass. There is no tenderness. There is no rebound and no guarding.   Musculoskeletal: He exhibits no edema.   Neurological: He is alert and oriented to person, place, and time.   Skin: Skin is warm and dry. No rash noted.   Psychiatric: He has a normal mood and affect.       Assessment/Plan   Dawson was seen today for pneumonia.    Diagnoses and all orders for this visit:    Pneumonia of right lower lobe due to infectious organism (CMS/HCC)    Essential hypertension  -     CBC Auto Differential; Future    Coronary artery disease with angina pectoris, unspecified vessel or lesion type, unspecified whether native or transplanted heart (CMS/HCC)    Gout, unspecified cause, unspecified chronicity, unspecified site  -     Uric Acid; Future    Screening for malignant neoplasm of colon    Screening for HIV (human immunodeficiency virus)  -     HIV-1 & HIV-2 Antibodies; Future    Encounter for hepatitis C virus screening test for high risk patient  -     Hepatitis C Antibody; Future    Screening for malignant neoplasm of prostate  -     PSA Screen; Future    Hyperlipidemia, unspecified hyperlipidemia type  -     Lipid Panel; Future    Type 2 diabetes mellitus with other specified complication, without long-term current use of insulin (CMS/HCC)  -     Comprehensive Metabolic Panel; Future  -     Hemoglobin A1c; Future  -     Microalbumin / Creatinine Urine Ratio - Urine,  Clean Catch; Future  -     TSH; Future  -     Vitamin B12; Future    Wheezing    Other orders  -     ipratropium-albuterol (DUO-NEB) 0.5-2.5 mg/3 ml nebulizer; Take 3 mL by nebulization Every 4 (Four) Hours As Needed for Wheezing.    Patient's O2 increased to 98 with treatment-prodused creamy sputum    Patient Instructions   Use nebulizer and finish antibiotics.  If still short of air to [facility].

## 2019-07-26 NOTE — PATIENT INSTRUCTIONS
Use nebulizer and finish antibiotics.  If still short of air to [facility].  Call Monday to report progress.  Rest and fluids.  Advise home nebulizer.

## 2019-07-27 LAB
BACTERIA SPEC AEROBE CULT: NORMAL
BACTERIA SPEC AEROBE CULT: NORMAL

## 2019-07-29 ENCOUNTER — TELEPHONE (OUTPATIENT)
Dept: FAMILY MEDICINE CLINIC | Facility: CLINIC | Age: 59
End: 2019-07-29

## 2019-07-29 RX ORDER — CEFDINIR 300 MG/1
300 CAPSULE ORAL EVERY 12 HOURS SCHEDULED
Qty: 8 CAPSULE | Refills: 0 | Status: SHIPPED | OUTPATIENT
Start: 2019-07-29 | End: 2019-08-02

## 2019-07-29 RX ORDER — DOXYCYCLINE HYCLATE 100 MG/1
100 TABLET, DELAYED RELEASE ORAL 2 TIMES DAILY
Qty: 8 TABLET | Refills: 0 | Status: SHIPPED | OUTPATIENT
Start: 2019-07-29 | End: 2019-10-04

## 2019-07-29 NOTE — TELEPHONE ENCOUNTER
PT CALLED AND STATES IS STILL COUGHING AND STILL SHORT OF WIND. STATES FINISHED THE ABX TODAY. AND IS STILL DOING NEBULIZER TREATMENTS BUT STILL COUGHING A LOT..

## 2019-07-31 ENCOUNTER — TELEPHONE (OUTPATIENT)
Dept: FAMILY MEDICINE CLINIC | Facility: CLINIC | Age: 59
End: 2019-07-31

## 2019-07-31 RX ORDER — FENOFIBRATE 134 MG/1
CAPSULE ORAL
Qty: 30 CAPSULE | Refills: 3 | Status: SHIPPED | OUTPATIENT
Start: 2019-07-31 | End: 2019-10-31 | Stop reason: SDUPTHER

## 2019-08-01 ENCOUNTER — READMISSION MANAGEMENT (OUTPATIENT)
Dept: CALL CENTER | Facility: HOSPITAL | Age: 59
End: 2019-08-01

## 2019-08-01 RX ORDER — HYDROCHLOROTHIAZIDE 12.5 MG/1
CAPSULE, GELATIN COATED ORAL
Qty: 30 CAPSULE | Refills: 3 | Status: SHIPPED | OUTPATIENT
Start: 2019-08-01 | End: 2019-12-26

## 2019-08-01 NOTE — TELEPHONE ENCOUNTER
CALLED PHARM THEY WERE SENT BY NEED TO SWITCH DOXY TO MONOHYDRATE AND THE CEFDINER IS EXEEDED PLEASE ADVISE.

## 2019-08-01 NOTE — OUTREACH NOTE
COPD/PN Week 2 Survey      Responses   Facility patient discharged from?  [facility]   Does the patient have one of the following disease processes/diagnoses(primary or secondary)?  COPD/Pneumonia   Was the primary reason for admission:  Pneumonia   Week 2 attempt successful?  Yes   Call start time  1345   Call end time  1346   Discharge diagnosis  Pneumonia of right lower lobe due    Week 2 call completed?  Yes   Wrap up additional comments  PATIENT CURRENTLY AT ANOTHER PHYSICIAN APPOINTMENT SPEAKING WITH NURSE AND REQUESTS A CALL NEXT WEEK.           Chelsey Whitehead LPN

## 2019-08-06 DIAGNOSIS — E11.65 TYPE 2 DIABETES MELLITUS WITH HYPERGLYCEMIA, UNSPECIFIED WHETHER LONG TERM INSULIN USE (HCC): ICD-10-CM

## 2019-08-06 DIAGNOSIS — I10 HYPERTENSION, UNSPECIFIED TYPE: ICD-10-CM

## 2019-08-06 DIAGNOSIS — E78.5 HYPERLIPIDEMIA, UNSPECIFIED HYPERLIPIDEMIA TYPE: Primary | Chronic | ICD-10-CM

## 2019-08-06 PROBLEM — Z83.3 FAMILY HISTORY OF DIABETES MELLITUS: Status: ACTIVE | Noted: 2019-08-06

## 2019-08-06 PROBLEM — R49.0 HOARSENESS, CHRONIC: Status: ACTIVE | Noted: 2018-12-14

## 2019-08-06 PROBLEM — J02.9 ACUTE PHARYNGITIS: Status: ACTIVE | Noted: 2018-08-07

## 2019-08-06 PROBLEM — S61.409A: Status: ACTIVE | Noted: 2018-12-11

## 2019-08-06 PROBLEM — J38.3 LESION OF VOCAL CORD: Status: ACTIVE | Noted: 2018-12-14

## 2019-08-06 PROBLEM — K43.2 INCISIONAL HERNIA: Status: ACTIVE | Noted: 2018-01-22

## 2019-08-06 PROBLEM — R06.02 SHORTNESS OF BREATH: Status: ACTIVE | Noted: 2017-05-10

## 2019-08-06 PROBLEM — R00.0 TACHYCARDIA: Status: ACTIVE | Noted: 2018-01-18

## 2019-08-06 PROBLEM — M79.605 LEG PAIN, LEFT: Status: ACTIVE | Noted: 2017-11-16

## 2019-08-06 PROBLEM — R00.1 BRADYCARDIA: Status: ACTIVE | Noted: 2018-07-18

## 2019-08-06 PROBLEM — J06.9 UPPER RESPIRATORY INFECTION: Status: ACTIVE | Noted: 2018-12-20

## 2019-08-06 PROBLEM — R94.31 ABNORMAL EKG: Status: ACTIVE | Noted: 2017-11-08

## 2019-08-06 PROBLEM — M79.7 FIBROMYOSITIS: Status: ACTIVE | Noted: 2017-02-06

## 2019-08-06 PROBLEM — M79.604 LEG PAIN, RIGHT: Status: ACTIVE | Noted: 2017-11-16

## 2019-08-06 PROBLEM — I70.90 ARTERIOSCLEROTIC VASCULAR DISEASE: Status: ACTIVE | Noted: 2017-02-10

## 2019-08-06 PROBLEM — M72.2 PLANTAR FASCIITIS, LEFT: Status: ACTIVE | Noted: 2017-01-09

## 2019-08-06 PROBLEM — R04.2 HEMOPTYSIS: Status: ACTIVE | Noted: 2018-03-02

## 2019-08-06 PROBLEM — Z79.899 OTHER LONG TERM (CURRENT) DRUG THERAPY: Status: ACTIVE | Noted: 2017-02-06

## 2019-08-06 PROBLEM — K76.0 FATTY LIVER: Status: ACTIVE | Noted: 2018-03-01

## 2019-08-06 PROBLEM — E66.9 OBESITY WITH BODY MASS INDEX 30 OR GREATER: Status: ACTIVE | Noted: 2017-11-15

## 2019-08-06 PROBLEM — R07.9 CHEST PAIN: Status: ACTIVE | Noted: 2019-01-02

## 2019-08-06 PROBLEM — I25.10 CHRONIC CORONARY ARTERY DISEASE: Status: ACTIVE | Noted: 2018-07-18

## 2019-08-06 PROBLEM — M19.90 OSTEOARTHRITIS: Status: ACTIVE | Noted: 2017-02-06

## 2019-08-06 PROBLEM — M47.817 OSTEOARTHRITIS OF LUMBOSACRAL SPINE WITHOUT MYELOPATHY: Status: ACTIVE | Noted: 2017-02-06

## 2019-08-06 PROBLEM — G47.10 HYPERSOMNIA: Status: ACTIVE | Noted: 2017-09-19

## 2019-08-06 PROBLEM — E66.9 OBESITY: Status: ACTIVE | Noted: 2017-04-24

## 2019-08-07 ENCOUNTER — TELEPHONE (OUTPATIENT)
Dept: FAMILY MEDICINE CLINIC | Facility: CLINIC | Age: 59
End: 2019-08-07

## 2019-08-07 NOTE — TELEPHONE ENCOUNTER
I CALLED PATIENT TO FU WITH HIM ON SOA AND PNEUMONIA. HE HAS TAKEN ALL HIS ANTIBIOTICS AND IS FEEING MUCH BETTER.   I REMINDED HIM HE HAS 12 HR FASTING LABS TO DO. HE WILL GET DONE AT THE SAME TIME AS DR INMAN

## 2019-08-08 ENCOUNTER — READMISSION MANAGEMENT (OUTPATIENT)
Dept: CALL CENTER | Facility: HOSPITAL | Age: 59
End: 2019-08-08

## 2019-08-09 ENCOUNTER — READMISSION MANAGEMENT (OUTPATIENT)
Dept: CALL CENTER | Facility: HOSPITAL | Age: 59
End: 2019-08-09

## 2019-08-09 NOTE — OUTREACH NOTE
COPD/PN Week 3 Survey      Responses   Facility patient discharged from?  [facility]   Does the patient have one of the following disease processes/diagnoses(primary or secondary)?  COPD/Pneumonia   Was the primary reason for admission:  Pneumonia   Week 3 attempt successful?  Yes   Call start time  1055   Call end time  1057   Discharge diagnosis  Pneumonia of right lower lobe due    Meds reviewed with patient/caregiver?  Yes   Is the patient having any side effects they believe may be caused by any medication additions or changes?  No   Does the patient have all medications ordered at discharge?  Yes   Is the patient taking all medications as directed (includes completed medication regime)?  Yes   Does the patient have a primary care provider?   Yes   Does the patient have an appointment with their PCP or pulmonologist within 7 days of discharge?  Yes   Has the patient kept scheduled appointments due by today?  Yes   Has home health visited the patient within 72 hours of discharge?  N/A   Psychosocial issues?  No   Comments  Seems to be doing much better this wk. Reports seeing his MDs and finishing ABX.   Did the patient receive a copy of their discharge instructions?  Yes   Nursing interventions  Reviewed instructions with patient   What is the patient's perception of their health status since discharge?  Returned to baseline/stable   Nursing Interventions  Nurse provided patient education   Is the patient/caregiver able to teach back the hierarchy of who to call/visit for symptoms/problems? PCP, Specialist, Home health nurse, Urgent Care, ED, 911  Yes   Is the patient/caregiver able to teach back signs and symptoms of worsening condition:  Fever/chills, Shortness of breath, Chest pain   Is the patient/caregiver able to teach back importance of completing antibiotic course of treatment?  Yes   Week 3 call completed?  Yes          Guzman Oden RN

## 2019-08-16 ENCOUNTER — READMISSION MANAGEMENT (OUTPATIENT)
Dept: CALL CENTER | Facility: HOSPITAL | Age: 59
End: 2019-08-16

## 2019-08-16 NOTE — OUTREACH NOTE
"COPD/PN Week 4 Survey      Responses   Facility patient discharged from?  [facility]   Does the patient have one of the following disease processes/diagnoses(primary or secondary)?  COPD/Pneumonia   Was the primary reason for admission:  Pneumonia   Week 4 attempt successful?  Yes   Call start time  1045   Call end time  1046   Discharge diagnosis  Pneumonia of right lower lobe due    Meds reviewed with patient/caregiver?  Yes   Is the patient having any side effects they believe may be caused by any medication additions or changes?  No   Is the patient taking all medications as directed (includes completed medication regime)?  Yes   Medication comments  States he got another antibiotic from Dr. Mcgarry and has finished that one also   Has the patient kept scheduled appointments due by today?  Yes   Comments  Patient states he has already follow up with Dr. Teran   Is the patient still receiving Home Health Services?  N/A   Psychosocial issues?  No   What is the patient's perception of their health status since discharge?  Improving [Patient states \"I'm feeling a 100% better and back to work\"]   Is the patient/caregiver able to teach back the hierarchy of who to call/visit for symptoms/problems? PCP, Specialist, Home health nurse, Urgent Care, ED, 911  Yes   Is the patient/caregiver able to teach back signs and symptoms of worsening condition:  Fever/chills, Shortness of breath, Chest pain   Is the patient/caregiver able to teach back importance of completing antibiotic course of treatment?  Yes   Week 4 call completed?  Yes   Wrap up additional comments  Patient states he is doing well with no needs or questions at this time.          Erma Leyva, KRUPA  "

## 2019-08-21 ENCOUNTER — OFFICE VISIT (OUTPATIENT)
Dept: CARDIOLOGY | Facility: CLINIC | Age: 59
End: 2019-08-21

## 2019-08-21 VITALS
SYSTOLIC BLOOD PRESSURE: 134 MMHG | HEIGHT: 66 IN | HEART RATE: 72 BPM | DIASTOLIC BLOOD PRESSURE: 78 MMHG | WEIGHT: 216 LBS | BODY MASS INDEX: 34.72 KG/M2

## 2019-08-21 DIAGNOSIS — I10 ESSENTIAL HYPERTENSION: ICD-10-CM

## 2019-08-21 DIAGNOSIS — R00.0 TACHYCARDIA: ICD-10-CM

## 2019-08-21 DIAGNOSIS — I25.119 CORONARY ARTERY DISEASE WITH ANGINA PECTORIS, UNSPECIFIED VESSEL OR LESION TYPE, UNSPECIFIED WHETHER NATIVE OR TRANSPLANTED HEART (HCC): ICD-10-CM

## 2019-08-21 DIAGNOSIS — R00.1 BRADYCARDIA: ICD-10-CM

## 2019-08-21 DIAGNOSIS — E78.2 MIXED HYPERLIPIDEMIA: Primary | Chronic | ICD-10-CM

## 2019-08-21 PROCEDURE — 99214 OFFICE O/P EST MOD 30 MIN: CPT | Performed by: INTERNAL MEDICINE

## 2019-08-21 PROCEDURE — 93000 ELECTROCARDIOGRAM COMPLETE: CPT | Performed by: INTERNAL MEDICINE

## 2019-08-21 RX ORDER — FENOFIBRATE 145 MG/1
145 TABLET, COATED ORAL DAILY
COMMUNITY
End: 2019-10-31 | Stop reason: SDUPTHER

## 2019-08-21 RX ORDER — PREGABALIN 100 MG/1
1 CAPSULE ORAL 2 TIMES DAILY
Refills: 0 | COMMUNITY
Start: 2019-08-04 | End: 2019-08-21

## 2019-08-21 NOTE — PROGRESS NOTES
Date of Office Visit: 2019  Encounter Provider: Dr. Prashanth Parikh   Place of Service: [facility] [place]  Patient Name: Dawson Lomax  :1960  Kavitha Teran MD    Chief Complaint   Patient presents with   • Hypertension  Hyperlipidemia  Diabetes mellitus     6 month follow up     History of Present Illness    I am pleased to see  in my office today as a follow-up.     As you know, patient is 58-year-old white gentleman whose past medical history is significant for hypertension, diabetes mellitus, hyperlipidemia, who came today for follow-up     In , patient was admitted with the symptom of chest pain.  patient underwent cardiac catheterization which showed nonobstructive CAD.  In 2017, patient underwent stress test again with chest pain.  Patient underwent stress test which showed possible inferior wall ischemia.  Repeat cardiac catheterization showed nonobstructive CAD.  Medical opinion treatment was recommended.    Since the previous visit, patient is overall stable.  He has developed neck pain and is diagnosed with cervical spine disease.  Patient is being considered for possible neck surgery.  Patient denies any chest pain or tightness or heaviness.  No orthopnea, PND, syncope or presyncope.  No leg edema noted.    EKG showed normal sinus rhythm.  Leftward axis noted.    At this stage I will continue current treatment.  His blood pressure is very well controlled.  I would advise patient can proceed with surgery as planned.  However patient has not picked up the date.  I intend to see the patient in 1 year.    Past Medical History:   Diagnosis Date   • Back pain    • Broken finger     broken middle finger   • Coronary artery disease with angina pectoris (CMS/HCC) 2019   • Diabetes mellitus, type 2 (CMS/HCC)    • Gout    • Hand lesion    • Heart attack (CMS/HCC)    • Hx of migraines    • Hyperlipidemia    • Hypertension    • MELISSA (obstructive sleep  apnea) 2017    treated with cpap in past, npsg 2017, ahi 33    • Voice absence     Losing Voice          Past Surgical History:   Procedure Laterality Date   • ANKLE SURGERY Left 2001   • CARDIAC CATHETERIZATION  2017   • CARPAL TUNNEL RELEASE Bilateral    • CATARACT EXTRACTION  2015   • HERNIA REPAIR     • OTHER SURGICAL HISTORY  2017    MI with Stent 2010, 2017    • SPLENECTOMY             Current Outpatient Medications:   •  aspirin 81 MG EC tablet, Take 81 mg by mouth Daily., Disp: , Rfl:   •  atorvastatin (LIPITOR) 20 MG tablet, Take 20 mg by mouth Daily., Disp: , Rfl: 5  •  CARTIA  MG 24 hr capsule, Take 240 mg by mouth Daily., Disp: , Rfl: 3  •  doxycycline (DORYX) 100 MG enteric coated tablet, Take 1 tablet by mouth 2 (Two) Times a Day., Disp: 8 tablet, Rfl: 0  •  fenofibrate (TRICOR) 145 MG tablet, Take 145 mg by mouth Daily., Disp: , Rfl:   •  fenofibrate micronized (LOFIBRA) 134 MG capsule, TAKE 1 CAPSULE BY MOUTH ONCE DAILY, Disp: 30 capsule, Rfl: 3  •  glyBURIDE (DIAbeta) 5 MG tablet, Take 10 mg by mouth 2 (Two) Times a Day., Disp: , Rfl: 3  •  hydrochlorothiazide (MICROZIDE) 12.5 MG capsule, TAKE 1 CAPSULE BY MOUTH ONCE DAILY, Disp: 30 capsule, Rfl: 3  •  ipratropium-albuterol (DUO-NEB) 0.5-2.5 mg/3 ml nebulizer, Take 3 mL by nebulization Every 4 (Four) Hours As Needed for Wheezing., Disp: 360 mL, Rfl: 3  •  JARDIANCE 25 MG tablet, Take 25 mg by mouth Daily., Disp: , Rfl: 6  •  losartan (COZAAR) 50 MG tablet, Take 100 mg by mouth Daily., Disp: , Rfl:   •  LYRICA 100 MG capsule, Take 100 mg by mouth 2 (Two) Times a Day As Needed (patient states only takes as needed)., Disp: , Rfl: 0  •  metFORMIN ER (GLUCOPHAGE-XR) 500 MG 24 hr tablet, Take 1,000 mg by mouth 2 (Two) Times a Day., Disp: , Rfl: 6  •  oxyCODONE (ROXICODONE) 10 MG tablet, Take 10 mg by mouth Every 6 (Six) Hours As Needed (patient states only takes as needed)., Disp: , Rfl: 0  •  pioglitazone (ACTOS) 15 MG tablet, Take 15 mg by mouth  "Daily., Disp: , Rfl: 5    Current Facility-Administered Medications:   •  ipratropium-albuterol (DUO-NEB) nebulizer solution 3 mL, 3 mL, Nebulization, 4x Daily - RT, Kavitha Teran MD      Social History     Socioeconomic History   • Marital status:      Spouse name: Not on file   • Number of children: Not on file   • Years of education: Not on file   • Highest education level: Not on file   Tobacco Use   • Smoking status: Former Smoker   Substance and Sexual Activity   • Alcohol use: No     Frequency: Never   • Drug use: No   • Sexual activity: Defer         Review of Systems   Constitution: Negative for chills and fever.   HENT: Negative for ear discharge and nosebleeds.    Eyes: Negative for discharge and redness.   Cardiovascular: Negative for chest pain, orthopnea, palpitations, paroxysmal nocturnal dyspnea and syncope.   Respiratory: Positive for shortness of breath. Negative for cough and wheezing.    Endocrine: Negative for heat intolerance.   Skin: Negative for rash.   Musculoskeletal: Positive for arthritis and neck pain. Negative for myalgias.   Gastrointestinal: Negative for abdominal pain, melena, nausea and vomiting.   Genitourinary: Negative for dysuria and hematuria.   Neurological: Negative for dizziness, light-headedness, numbness and tremors.   Psychiatric/Behavioral: Negative for depression. The patient is nervous/anxious.        Procedures      ECG 12 Lead  Date/Time: 8/21/2019 5:32 PM  Performed by: Prashanth Parikh MD  Authorized by: Prashanth Parkih MD   Rhythm: sinus rhythm  QRS axis: left  Other findings: non-specific ST-T wave changes    Clinical impression: abnormal EKG            ECG 12 Lead    (Results Pending)           Objective:    /78   Pulse 72   Ht 167.6 cm (66\")   Wt 98 kg (216 lb)   BMI 34.86 kg/m²         Physical Exam   Constitutional: He is oriented to person, place, and time. He appears well-developed and well-nourished.   HENT:   Head: Normocephalic and " atraumatic.   Eyes: No scleral icterus.   Neck: No thyromegaly present.   Cardiovascular: Normal rate, regular rhythm and normal heart sounds. Exam reveals no gallop and no friction rub.   No murmur heard.  Pulmonary/Chest: Effort normal and breath sounds normal. No respiratory distress. He has no wheezes. He has no rales.   Abdominal: There is no tenderness.   Musculoskeletal: He exhibits no edema.   Lymphadenopathy:     He has no cervical adenopathy.   Neurological: He is alert and oriented to person, place, and time.   Skin: No rash noted. No erythema.   Psychiatric: He has a normal mood and affect.           Assessment:       Diagnosis Plan   1. Mixed hyperlipidemia  ECG 12 Lead   2. Coronary artery disease with angina pectoris, unspecified vessel or lesion type, unspecified whether native or transplanted heart (CMS/HCC)  ECG 12 Lead   3. Essential hypertension  ECG 12 Lead   4. Tachycardia  ECG 12 Lead   5. Bradycardia  ECG 12 Lead            Plan:       At this stage, I would recommend to proceed with current treatment.  Patient can proceed with cervical spine surgery.

## 2019-09-20 ENCOUNTER — CLINICAL SUPPORT (OUTPATIENT)
Dept: FAMILY MEDICINE CLINIC | Facility: CLINIC | Age: 59
End: 2019-09-20

## 2019-09-20 VITALS — SYSTOLIC BLOOD PRESSURE: 144 MMHG | DIASTOLIC BLOOD PRESSURE: 82 MMHG | WEIGHT: 216.4 LBS | BODY MASS INDEX: 34.93 KG/M2

## 2019-09-20 DIAGNOSIS — I10 ESSENTIAL HYPERTENSION: Primary | ICD-10-CM

## 2019-09-20 PROCEDURE — 99211 OFF/OP EST MAY X REQ PHY/QHP: CPT | Performed by: PREVENTIVE MEDICINE

## 2019-09-23 ENCOUNTER — TELEPHONE (OUTPATIENT)
Dept: FAMILY MEDICINE CLINIC | Facility: CLINIC | Age: 59
End: 2019-09-23

## 2019-09-23 NOTE — TELEPHONE ENCOUNTER
Make sure patient taking meds as prescribed and call 10 blood pressures over 2 weeks.  Make sure cuff has been checked for accuracy or he goes to pharmacy or here for checks

## 2019-10-02 ENCOUNTER — TELEPHONE (OUTPATIENT)
Dept: FAMILY MEDICINE CLINIC | Facility: CLINIC | Age: 59
End: 2019-10-02

## 2019-10-02 NOTE — TELEPHONE ENCOUNTER
Called and asked if you could refer him to a rheumatologist for shoulder pain. He already sees pain management and suggested he tell them and see if they would treat but wants rheumatologist

## 2019-10-04 ENCOUNTER — OFFICE VISIT (OUTPATIENT)
Dept: FAMILY MEDICINE CLINIC | Facility: CLINIC | Age: 59
End: 2019-10-04

## 2019-10-04 ENCOUNTER — RESULTS ENCOUNTER (OUTPATIENT)
Dept: FAMILY MEDICINE CLINIC | Facility: CLINIC | Age: 59
End: 2019-10-04

## 2019-10-04 ENCOUNTER — TELEPHONE (OUTPATIENT)
Dept: FAMILY MEDICINE CLINIC | Facility: CLINIC | Age: 59
End: 2019-10-04

## 2019-10-04 VITALS
RESPIRATION RATE: 16 BRPM | BODY MASS INDEX: 35.77 KG/M2 | SYSTOLIC BLOOD PRESSURE: 151 MMHG | WEIGHT: 221.6 LBS | OXYGEN SATURATION: 96 % | HEART RATE: 76 BPM | DIASTOLIC BLOOD PRESSURE: 87 MMHG | TEMPERATURE: 98 F

## 2019-10-04 DIAGNOSIS — Z12.11 SCREENING FOR MALIGNANT NEOPLASM OF COLON: ICD-10-CM

## 2019-10-04 DIAGNOSIS — J18.9 PNEUMONIA OF RIGHT LOWER LOBE DUE TO INFECTIOUS ORGANISM: ICD-10-CM

## 2019-10-04 DIAGNOSIS — M25.519 CHRONIC SHOULDER PAIN, UNSPECIFIED LATERALITY: ICD-10-CM

## 2019-10-04 DIAGNOSIS — E55.9 VITAMIN D DEFICIENCY: ICD-10-CM

## 2019-10-04 DIAGNOSIS — M10.9 GOUT, UNSPECIFIED CAUSE, UNSPECIFIED CHRONICITY, UNSPECIFIED SITE: Chronic | ICD-10-CM

## 2019-10-04 DIAGNOSIS — R10.30 SEVERE GROIN PAIN: ICD-10-CM

## 2019-10-04 DIAGNOSIS — J38.3 LESION OF VOCAL CORD: ICD-10-CM

## 2019-10-04 DIAGNOSIS — E11.65 TYPE 2 DIABETES MELLITUS WITH HYPERGLYCEMIA, UNSPECIFIED WHETHER LONG TERM INSULIN USE (HCC): ICD-10-CM

## 2019-10-04 DIAGNOSIS — I10 ESSENTIAL HYPERTENSION: ICD-10-CM

## 2019-10-04 DIAGNOSIS — Z00.01 ENCOUNTER FOR ANNUAL GENERAL MEDICAL EXAMINATION WITH ABNORMAL FINDINGS IN ADULT: Primary | ICD-10-CM

## 2019-10-04 DIAGNOSIS — Z11.4 SCREENING FOR HIV (HUMAN IMMUNODEFICIENCY VIRUS): ICD-10-CM

## 2019-10-04 DIAGNOSIS — R42 DIZZINESS AFTER EXTENSION OF NECK: ICD-10-CM

## 2019-10-04 DIAGNOSIS — E78.2 MIXED HYPERLIPIDEMIA: Chronic | ICD-10-CM

## 2019-10-04 DIAGNOSIS — G47.33 OSA (OBSTRUCTIVE SLEEP APNEA): Chronic | ICD-10-CM

## 2019-10-04 DIAGNOSIS — Z90.81 ACQUIRED ASPLENIA: ICD-10-CM

## 2019-10-04 DIAGNOSIS — Z91.89 ENCOUNTER FOR HEPATITIS C VIRUS SCREENING TEST FOR HIGH RISK PATIENT: ICD-10-CM

## 2019-10-04 DIAGNOSIS — Z12.5 SCREENING FOR MALIGNANT NEOPLASM OF PROSTATE: ICD-10-CM

## 2019-10-04 DIAGNOSIS — I25.119 CORONARY ARTERY DISEASE WITH ANGINA PECTORIS, UNSPECIFIED VESSEL OR LESION TYPE, UNSPECIFIED WHETHER NATIVE OR TRANSPLANTED HEART (HCC): ICD-10-CM

## 2019-10-04 DIAGNOSIS — E53.8 B12 DEFICIENCY: ICD-10-CM

## 2019-10-04 DIAGNOSIS — Z11.59 ENCOUNTER FOR HEPATITIS C VIRUS SCREENING TEST FOR HIGH RISK PATIENT: ICD-10-CM

## 2019-10-04 DIAGNOSIS — R00.0 TACHYCARDIA: ICD-10-CM

## 2019-10-04 DIAGNOSIS — G89.29 CHRONIC SHOULDER PAIN, UNSPECIFIED LATERALITY: ICD-10-CM

## 2019-10-04 DIAGNOSIS — R41.3 MEMORY LOSS: ICD-10-CM

## 2019-10-04 PROBLEM — M72.2 PLANTAR FASCIITIS, LEFT: Status: RESOLVED | Noted: 2017-01-09 | Resolved: 2019-10-04

## 2019-10-04 PROBLEM — Z86.69 HX OF MIGRAINES: Status: RESOLVED | Noted: 2019-10-04 | Resolved: 2019-10-04

## 2019-10-04 PROBLEM — I21.9 HEART ATTACK: Status: ACTIVE | Noted: 2017-01-01

## 2019-10-04 PROBLEM — S62.609A BROKEN FINGER: Status: RESOLVED | Noted: 2019-10-04 | Resolved: 2019-10-04

## 2019-10-04 PROCEDURE — 99214 OFFICE O/P EST MOD 30 MIN: CPT | Performed by: PREVENTIVE MEDICINE

## 2019-10-04 PROCEDURE — 99396 PREV VISIT EST AGE 40-64: CPT | Performed by: PREVENTIVE MEDICINE

## 2019-10-04 RX ORDER — PANTOPRAZOLE SODIUM 40 MG/1
40 TABLET, DELAYED RELEASE ORAL DAILY
Refills: 6 | COMMUNITY
Start: 2019-09-23 | End: 2020-09-02

## 2019-10-04 RX ORDER — MONTELUKAST SODIUM 10 MG/1
10 TABLET ORAL DAILY
Refills: 5 | COMMUNITY
Start: 2019-09-23 | End: 2020-09-02

## 2019-10-04 RX ORDER — SULINDAC 200 MG/1
200 TABLET ORAL 2 TIMES DAILY
Qty: 60 TABLET | Refills: 1 | Status: SHIPPED | OUTPATIENT
Start: 2019-10-04 | End: 2020-09-02

## 2019-10-04 RX ORDER — CYCLOBENZAPRINE HCL 10 MG
10 TABLET ORAL 2 TIMES DAILY PRN
Refills: 0 | COMMUNITY
Start: 2019-09-20 | End: 2020-09-02

## 2019-10-04 RX ORDER — LIDOCAINE 50 MG/G
OINTMENT TOPICAL
Refills: 0 | COMMUNITY
Start: 2019-08-23 | End: 2022-06-14

## 2019-10-04 NOTE — PROGRESS NOTES
Subjective   Dawson Lomax is a 58 y.o. male presents for   Chief Complaint   Patient presents with   • Shoulder Pain     shoulder blade referral to  rheumatology     Wellness exam shows needs labs, vaccinations for asplenism andhaveing bilgroin andneckpain radiating to [place].  Will get cologuard and 12 hour fast for labs.  Needs tocheck vocal cord problem and arrange neck surgery  Health Maintenance Due   Topic Date Due   • TDAP/TD VACCINES (1 - Tdap) 12/20/1979   • ZOSTER VACCINE (1 of 2) 12/20/2010   • HEPATITIS C SCREENING  09/11/2017   • COLONOSCOPY  09/11/2017   • DIABETIC EYE EXAM  05/01/2019   • PROSTATE CANCER SCREENING  07/26/2019   • INFLUENZA VACCINE  08/01/2019       History of Present Illness     Vitals:    10/04/19 0924 10/04/19 0928   BP: 148/80 151/87   BP Location: Right arm Left arm   Patient Position: Sitting Sitting   Cuff Size: Large Adult Large Adult   Pulse: 76    Resp: 16    Temp: 98 °F (36.7 °C)    TempSrc: Oral    SpO2: 96%    Weight: 101 kg (221 lb 9.6 oz)        Current Outpatient Medications on File Prior to Visit   Medication Sig Dispense Refill   • aspirin 81 MG EC tablet Take 81 mg by mouth Daily.     • atorvastatin (LIPITOR) 20 MG tablet Take 20 mg by mouth Daily.  5   • CARTIA  MG 24 hr capsule Take 240 mg by mouth Daily.  3   • cyclobenzaprine (FLEXERIL) 10 MG tablet Take 10 mg by mouth 2 (Two) Times a Day.  0   • Diclofenac Sodium 1.5 % solution APPLY TOPICALLY AS DIRECTED DAILY AS NEEDED FOR 30 DAYS  0   • fenofibrate (TRICOR) 145 MG tablet Take 145 mg by mouth Daily.     • fenofibrate micronized (LOFIBRA) 134 MG capsule TAKE 1 CAPSULE BY MOUTH ONCE DAILY 30 capsule 3   • glyBURIDE (DIAbeta) 5 MG tablet Take 10 mg by mouth 2 (Two) Times a Day.  3   • hydrochlorothiazide (MICROZIDE) 12.5 MG capsule TAKE 1 CAPSULE BY MOUTH ONCE DAILY 30 capsule 3   • ipratropium-albuterol (DUO-NEB) 0.5-2.5 mg/3 ml nebulizer Take 3 mL by nebulization Every 4 (Four) Hours As Needed for  Wheezing. 360 mL 3   • JARDIANCE 25 MG tablet Take 25 mg by mouth Daily.  6   • lidocaine (XYLOCAINE) 5 % ointment USE OINTMENT EXTERNALLY THREE TIMES A DAY FOR 30 DAYS AS NEEDED  0   • losartan (COZAAR) 50 MG tablet Take 100 mg by mouth Daily.     • LYRICA 100 MG capsule Take 100 mg by mouth 2 (Two) Times a Day As Needed (patient states only takes as needed).  0   • metFORMIN ER (GLUCOPHAGE-XR) 500 MG 24 hr tablet Take 1,000 mg by mouth 2 (Two) Times a Day.  6   • montelukast (SINGULAIR) 10 MG tablet Take 10 mg by mouth Daily.  5   • oxyCODONE (ROXICODONE) 10 MG tablet Take 10 mg by mouth Every 6 (Six) Hours As Needed (patient states only takes as needed).  0   • pantoprazole (PROTONIX) 40 MG EC tablet Take 40 mg by mouth Daily. FOR 30 DAYS  6   • pioglitazone (ACTOS) 15 MG tablet Take 15 mg by mouth Daily.  5   • [DISCONTINUED] doxycycline (DORYX) 100 MG enteric coated tablet Take 1 tablet by mouth 2 (Two) Times a Day. 8 tablet 0     Current Facility-Administered Medications on File Prior to Visit   Medication Dose Route Frequency Provider Last Rate Last Dose   • ipratropium-albuterol (DUO-NEB) nebulizer solution 3 mL  3 mL Nebulization 4x Daily - RT Kavitha Teran MD           The following portions of the patient's history were reviewed and updated as appropriate: allergies, current medications, past family history, past medical history, past social history, past surgical history and problem list.    Review of Systems   Constitutional: Negative.         Vertigo   HENT: Positive for congestion and voice change. Negative for sinus pressure and sore throat.    Eyes: Negative.    Respiratory: Negative.  Negative for cough.    Cardiovascular: Negative.    Gastrointestinal: Negative.    Endocrine: Negative.    Genitourinary: Positive for erectile dysfunction.   Musculoskeletal: Positive for arthralgias, gait problem and neck pain.   Skin: Negative.    Allergic/Immunologic: Positive for environmental allergies.    Neurological: Positive for memory problem.   Hematological: Negative.    Psychiatric/Behavioral: Negative.        Objective   Physical Exam   Constitutional: He is oriented to person, place, and time. He appears well-developed and well-nourished.   HENT:   Head: Normocephalic and atraumatic.   Eyes: Conjunctivae and EOM are normal. Pupils are equal, round, and reactive to light.   Neck: Normal range of motion. Neck supple.   L post shoulder pain recreated with neck movement.   Cardiovascular: Normal rate, regular rhythm, normal heart sounds and intact distal pulses.   Pulmonary/Chest: Effort normal and breath sounds normal.   Decreased breath sound fanny     Abdominal: Soft. Bowel sounds are normal. A hernia is present.   Genitourinary:   Genitourinary Comments: No femoral or inguinalhernia   Musculoskeletal: Normal range of motion.   Decreased neck and hip ROM and antagia.   Neurological: He is alert and oriented to person, place, and time.   Skin: Skin is warm and dry.   Psychiatric: He has a normal mood and affect.   Nursing note and vitals reviewed.    PHQ-9 Total Score:      Assessment/Plan   Dawson was seen today for shoulder pain.    Diagnoses and all orders for this visit:    Encounter for annual general medical examination with abnormal findings in adult    Gout, unspecified cause, unspecified chronicity, unspecified site  -     Uric Acid    Mixed hyperlipidemia  -     Lipid Panel    Coronary artery disease with angina pectoris, unspecified vessel or lesion type, unspecified whether native or transplanted heart (CMS/[facility])    Essential hypertension    Pneumonia of right lower lobe due to infectious organism (CMS/[facility])  -     XR Chest PA & Lateral    MELISSA (obstructive sleep apnea)    B12 deficiency  -     Vitamin B12    Lesion of vocal cord    Type 2 diabetes mellitus with hyperglycemia, unspecified whether long term insulin use (CMS/[facility])  -     CBC Auto Differential  -     Comprehensive Metabolic Panel  -      Hemoglobin A1c  -     Microalbumin / Creatinine Urine Ratio - Urine, Clean Catch    Vitamin D deficiency  -     Vitamin D 25 Hydroxy    Acquired asplenia  -     haemophilus B polysac-tetanus toxoid, ActHIB, (ActHIB) injection; Inject 0.5 mL into the appropriate muscle as directed by prescriber 1 (One) Time for 1 dose. To be administered at the pharmacy  -     meningococcal vaccine (MENOMUNE) injection; Inject 0.5 mL under the skin into the appropriate area as directed 1 (One) Time for 1 dose. To be administered at the pharmacy  -     Meningococcal B Vac, Recomb, (TRUMENBA) injection; Inject 0.5 mL into the appropriate muscle as directed by prescriber 1 (One) Time for 1 dose. To be administered at the pharmacy  -     pneumococcal polysaccharide 23-valent (PNEUMOVAX 23) 25 MCG/0.5ML vaccine; Inject 0.5 mL into the appropriate muscle as directed by prescriber 1 (One) Time for 1 dose. To be administered at the pharmacy    Screening for HIV (human immunodeficiency virus)  -     HIV-1 & HIV-2 Antibodies    Encounter for hepatitis C virus screening test for high risk patient  -     Hepatitis C Antibody    Screening for malignant neoplasm of colon  -     Cologuard - Stool, Per Rectum; Future    Screening for malignant neoplasm of prostate  -     PSA Screen    Tachycardia  -     Magnesium  -     TSH    Chronic shoulder pain, unspecified laterality  -     Sedimentation Rate  -     sulindac (CLINORIL) 200 MG tablet; Take 1 tablet by mouth 2 (Two) Times a Day.    Dizziness after extension of neck  -     Ambulatory Referral to Neurology  -     Duplex Carotid Ultrasound CAR; Future    Memory loss  -     Ambulatory Referral to Neurology    Severe groin pain  -     XR Hips Bilateral With or Without Pelvis 3-4 View; Future        Patient Instructions   Advise eye exam.  12 hour fast for labs. Return cologuard.  Vaccinations ordered for asplenia and add flu shot.  Get xrays chest and hips.  Reschedule withDr. K foprvocalchords.

## 2019-10-04 NOTE — TELEPHONE ENCOUNTER
Pharmacy called and states that the insurance will only pay for him to get the pneumococcal done there. Please advise. I [facility].

## 2019-10-04 NOTE — PATIENT INSTRUCTIONS
Advise eye exam.  12 hour fast for labs. Return cologuard.  Vaccinations ordered for asplenia and add flu shot.  Get xrays chest and hips.  Reschedule withDr. MUNA foprvocalchords.

## 2019-10-07 ENCOUNTER — APPOINTMENT (OUTPATIENT)
Dept: GENERAL RADIOLOGY | Facility: HOSPITAL | Age: 59
End: 2019-10-07

## 2019-10-08 ENCOUNTER — CLINICAL SUPPORT (OUTPATIENT)
Dept: FAMILY MEDICINE CLINIC | Facility: CLINIC | Age: 59
End: 2019-10-08

## 2019-10-08 DIAGNOSIS — M10.9 GOUT, UNSPECIFIED CAUSE, UNSPECIFIED CHRONICITY, UNSPECIFIED SITE: Chronic | ICD-10-CM

## 2019-10-08 DIAGNOSIS — D72.829 LEUKOCYTOSIS, UNSPECIFIED TYPE: ICD-10-CM

## 2019-10-08 DIAGNOSIS — R00.1 BRADYCARDIA: ICD-10-CM

## 2019-10-08 DIAGNOSIS — M47.817 OSTEOARTHRITIS OF LUMBOSACRAL SPINE WITHOUT MYELOPATHY: ICD-10-CM

## 2019-10-08 DIAGNOSIS — E53.8 B12 DEFICIENCY: ICD-10-CM

## 2019-10-08 DIAGNOSIS — K76.0 FATTY LIVER: ICD-10-CM

## 2019-10-08 DIAGNOSIS — I10 ESSENTIAL HYPERTENSION: ICD-10-CM

## 2019-10-08 DIAGNOSIS — E78.2 MIXED HYPERLIPIDEMIA: Chronic | ICD-10-CM

## 2019-10-08 LAB
ALBUMIN SERPL-MCNC: 4.3 G/DL (ref 3.5–4.8)
ALBUMIN UR-MCNC: 9 MG/L
ALBUMIN/GLOB SERPL: 1.3 G/DL (ref 1–1.7)
ALP SERPL-CCNC: 60 U/L (ref 32–91)
ALT SERPL W P-5'-P-CCNC: 28 U/L (ref 17–63)
ANION GAP SERPL CALCULATED.3IONS-SCNC: 14.2 MMOL/L (ref 5–15)
ARTICHOKE IGE QN: 95 MG/DL (ref 0–100)
AST SERPL-CCNC: 24 U/L (ref 15–41)
BASOPHILS # BLD AUTO: 0.1 10*3/MM3 (ref 0–0.2)
BASOPHILS NFR BLD AUTO: 1 % (ref 0–1.5)
BILIRUB SERPL-MCNC: 0.9 MG/DL (ref 0.3–1.2)
BUN BLD-MCNC: 14 MG/DL (ref 8–20)
BUN/CREAT SERPL: 12.7 (ref 6.2–20.3)
CALCIUM SPEC-SCNC: 9.6 MG/DL (ref 8.9–10.3)
CHLORIDE SERPL-SCNC: 101 MMOL/L (ref 101–111)
CHOLEST SERPL-MCNC: 152 MG/DL
CO2 SERPL-SCNC: 26 MMOL/L (ref 22–32)
CREAT BLD-MCNC: 1.1 MG/DL (ref 0.7–1.2)
CREAT UR-MCNC: 41.2 MG/DL
DEPRECATED RDW RBC AUTO: 45.1 FL (ref 37–54)
EOSINOPHIL # BLD AUTO: 0.2 10*3/MM3 (ref 0–0.4)
EOSINOPHIL NFR BLD AUTO: 1.5 % (ref 0.3–6.2)
ERYTHROCYTE [DISTWIDTH] IN BLOOD BY AUTOMATED COUNT: 13.7 % (ref 12.3–15.4)
ERYTHROCYTE [SEDIMENTATION RATE] IN BLOOD: 6 MM/HR (ref 0–20)
GFR SERPL CREATININE-BSD FRML MDRD: 69 ML/MIN/1.73
GLOBULIN UR ELPH-MCNC: 3.3 GM/DL (ref 2.5–3.8)
GLUCOSE BLD-MCNC: 181 MG/DL (ref 65–99)
HCT VFR BLD AUTO: 47.2 % (ref 37.5–51)
HDLC SERPL QL: 5.24
HDLC SERPL-MCNC: 29 MG/DL
HGB BLD-MCNC: 16 G/DL (ref 13–17.7)
HIV1+2 AB SER QL: NORMAL
LDLC/HDLC SERPL: 2.9 {RATIO}
LYMPHOCYTES # BLD AUTO: 4.2 10*3/MM3 (ref 0.7–3.1)
LYMPHOCYTES NFR BLD AUTO: 32.7 % (ref 19.6–45.3)
MAGNESIUM SERPL-MCNC: 2.1 MG/DL (ref 1.8–2.5)
MCH RBC QN AUTO: 32 PG (ref 26.6–33)
MCHC RBC AUTO-ENTMCNC: 33.9 G/DL (ref 31.5–35.7)
MCV RBC AUTO: 94.5 FL (ref 79–97)
MICROALBUMIN/CREAT UR: 21.8 UG/MG
MONOCYTES # BLD AUTO: 1.6 10*3/MM3 (ref 0.1–0.9)
MONOCYTES NFR BLD AUTO: 12.3 % (ref 5–12)
NEUTROPHILS # BLD AUTO: 6.7 10*3/MM3 (ref 1.7–7)
NEUTROPHILS NFR BLD AUTO: 52.5 % (ref 42.7–76)
NRBC BLD AUTO-RTO: 0.1 /100 WBC (ref 0–0.2)
PLATELET # BLD AUTO: 419 10*3/MM3 (ref 140–450)
PMV BLD AUTO: 7.5 FL (ref 6–12)
POTASSIUM BLD-SCNC: 4.2 MMOL/L (ref 3.6–5.1)
PROT SERPL-MCNC: 7.6 G/DL (ref 6.1–7.9)
PSA SERPL-MCNC: 0.75 NG/ML (ref 0–4)
RBC # BLD AUTO: 5 10*6/MM3 (ref 4.14–5.8)
SODIUM BLD-SCNC: 137 MMOL/L (ref 136–144)
TRIGL SERPL-MCNC: 195 MG/DL
TSH SERPL DL<=0.05 MIU/L-ACNC: 1.77 UIU/ML (ref 0.34–5.6)
URATE SERPL-MCNC: 4.7 MG/DL (ref 4.8–8.7)
VIT B12 BLD-MCNC: 504 PG/ML (ref 180–914)
VLDLC SERPL-MCNC: 39 MG/DL
WBC NRBC COR # BLD: 12.8 10*3/MM3 (ref 3.4–10.8)

## 2019-10-08 PROCEDURE — 85025 COMPLETE CBC W/AUTO DIFF WBC: CPT | Performed by: PREVENTIVE MEDICINE

## 2019-10-08 PROCEDURE — 83735 ASSAY OF MAGNESIUM: CPT | Performed by: PREVENTIVE MEDICINE

## 2019-10-08 PROCEDURE — 83036 HEMOGLOBIN GLYCOSYLATED A1C: CPT | Performed by: PREVENTIVE MEDICINE

## 2019-10-08 PROCEDURE — 82043 UR ALBUMIN QUANTITATIVE: CPT | Performed by: PREVENTIVE MEDICINE

## 2019-10-08 PROCEDURE — 82306 VITAMIN D 25 HYDROXY: CPT | Performed by: PREVENTIVE MEDICINE

## 2019-10-08 PROCEDURE — 84443 ASSAY THYROID STIM HORMONE: CPT | Performed by: PREVENTIVE MEDICINE

## 2019-10-08 PROCEDURE — G0103 PSA SCREENING: HCPCS | Performed by: PREVENTIVE MEDICINE

## 2019-10-08 PROCEDURE — 85652 RBC SED RATE AUTOMATED: CPT | Performed by: PREVENTIVE MEDICINE

## 2019-10-08 PROCEDURE — 84550 ASSAY OF BLOOD/URIC ACID: CPT | Performed by: PREVENTIVE MEDICINE

## 2019-10-08 PROCEDURE — 86803 HEPATITIS C AB TEST: CPT | Performed by: PREVENTIVE MEDICINE

## 2019-10-08 PROCEDURE — G0432 EIA HIV-1/HIV-2 SCREEN: HCPCS | Performed by: PREVENTIVE MEDICINE

## 2019-10-08 PROCEDURE — 36415 COLL VENOUS BLD VENIPUNCTURE: CPT | Performed by: PREVENTIVE MEDICINE

## 2019-10-08 PROCEDURE — 82570 ASSAY OF URINE CREATININE: CPT | Performed by: PREVENTIVE MEDICINE

## 2019-10-08 PROCEDURE — 80061 LIPID PANEL: CPT | Performed by: PREVENTIVE MEDICINE

## 2019-10-08 PROCEDURE — 82607 VITAMIN B-12: CPT | Performed by: PREVENTIVE MEDICINE

## 2019-10-08 PROCEDURE — 80053 COMPREHEN METABOLIC PANEL: CPT | Performed by: PREVENTIVE MEDICINE

## 2019-10-09 LAB
25(OH)D3 SERPL-MCNC: 20.8 NG/ML (ref 30–100)
HBA1C MFR BLD: 9.6 % (ref 3.5–5.6)
HCV AB SER DONR QL: NORMAL

## 2019-10-10 ENCOUNTER — HOSPITAL ENCOUNTER (OUTPATIENT)
Dept: GENERAL RADIOLOGY | Facility: HOSPITAL | Age: 59
Discharge: HOME | End: 2019-10-10

## 2019-10-10 ENCOUNTER — HOSPITAL ENCOUNTER (OUTPATIENT)
Dept: ULTRASOUND IMAGING | Facility: HOSPITAL | Age: 59
Discharge: HOME | End: 2019-10-10
Admitting: PREVENTIVE MEDICINE

## 2019-10-10 DIAGNOSIS — R42 DIZZINESS AFTER EXTENSION OF NECK: ICD-10-CM

## 2019-10-10 DIAGNOSIS — R10.30 SEVERE GROIN PAIN: ICD-10-CM

## 2019-10-10 DIAGNOSIS — J18.9 PNEUMONIA OF RIGHT LOWER LOBE DUE TO INFECTIOUS ORGANISM: Primary | ICD-10-CM

## 2019-10-10 PROCEDURE — 71046 X-RAY EXAM CHEST 2 VIEWS: CPT

## 2019-10-10 PROCEDURE — 93880 EXTRACRANIAL BILAT STUDY: CPT

## 2019-10-10 PROCEDURE — 73522 X-RAY EXAM HIPS BI 3-4 VIEWS: CPT

## 2019-10-19 ENCOUNTER — HOSPITAL ENCOUNTER (OUTPATIENT)
Dept: CT IMAGING | Facility: HOSPITAL | Age: 59
Discharge: HOME | End: 2019-10-19
Admitting: PREVENTIVE MEDICINE

## 2019-10-19 DIAGNOSIS — J18.9 PNEUMONIA OF RIGHT LOWER LOBE DUE TO INFECTIOUS ORGANISM: ICD-10-CM

## 2019-10-19 PROCEDURE — 71250 CT THORAX DX C-: CPT

## 2019-10-25 PROBLEM — R00.1 BRADYCARDIA, SINUS: Status: ACTIVE | Noted: 2019-10-25

## 2019-10-25 PROBLEM — E78.2 MIXED HYPERLIPIDEMIA: Status: ACTIVE | Noted: 2019-07-22

## 2019-10-31 ENCOUNTER — OFFICE VISIT (OUTPATIENT)
Dept: CARDIOLOGY | Facility: CLINIC | Age: 59
End: 2019-10-31

## 2019-10-31 VITALS
WEIGHT: 217 LBS | BODY MASS INDEX: 34.87 KG/M2 | HEART RATE: 79 BPM | HEIGHT: 66 IN | DIASTOLIC BLOOD PRESSURE: 74 MMHG | SYSTOLIC BLOOD PRESSURE: 138 MMHG

## 2019-10-31 DIAGNOSIS — R00.1 BRADYCARDIA, SINUS: ICD-10-CM

## 2019-10-31 DIAGNOSIS — I65.22 CAROTID ARTERY STENOSIS, SYMPTOMATIC, LEFT: ICD-10-CM

## 2019-10-31 DIAGNOSIS — I10 ESSENTIAL HYPERTENSION: ICD-10-CM

## 2019-10-31 DIAGNOSIS — E78.2 MIXED HYPERLIPIDEMIA: ICD-10-CM

## 2019-10-31 DIAGNOSIS — I25.119 CORONARY ARTERY DISEASE WITH ANGINA PECTORIS, UNSPECIFIED VESSEL OR LESION TYPE, UNSPECIFIED WHETHER NATIVE OR TRANSPLANTED HEART (HCC): Primary | ICD-10-CM

## 2019-10-31 PROCEDURE — 99214 OFFICE O/P EST MOD 30 MIN: CPT | Performed by: INTERNAL MEDICINE

## 2019-10-31 RX ORDER — FENOFIBRATE 145 MG/1
145 TABLET, COATED ORAL DAILY
Qty: 90 TABLET | Refills: 3 | Status: SHIPPED | OUTPATIENT
Start: 2019-10-31 | End: 2020-12-09 | Stop reason: SDUPTHER

## 2019-10-31 RX ORDER — ISOSORBIDE MONONITRATE 30 MG/1
30 TABLET, EXTENDED RELEASE ORAL DAILY
Qty: 90 TABLET | Refills: 3 | Status: SHIPPED | OUTPATIENT
Start: 2019-10-31 | End: 2020-04-22 | Stop reason: SDUPTHER

## 2019-10-31 NOTE — PROGRESS NOTES
Date of Office Visit: 10/31/2019  Encounter Provider: Dr. Prashanth Parikh  Place of Service: [facility] CARDIOLOGY [place]  Patient Name: Dawson Lomax  :1960  Kavitha Teran MD    Chief Complaint   Patient presents with   • Coronary Artery Disease     2 month follow up    • Hypertension   • Hyperlipidemia   • Slow Heart Rate     History of Present Illness    I am pleased to see  in my office today as a follow-up.     As you know, patient is 58-year-old white gentleman whose past medical history is significant for hypertension, diabetes mellitus, hyperlipidemia, who came today for follow-up     In , patient was admitted with the symptom of chest pain.  patient underwent cardiac catheterization which showed nonobstructive CAD.  In 2017, patient underwent stress test again with chest pain.  Patient underwent stress test which showed possible inferior wall ischemia.  Repeat cardiac catheterization showed nonobstructive CAD.  Medical opinion treatment was recommended.    This is unscheduled visit for this patient.  Patient reports that about 2 to 3 weeks ago he woke up from his sleep with left-sided chest pain which radiated to the back and left shoulder and arm.  Patient had this pain for almost 7 to 10 days.  He was evaluated by PCP and CAT scan of chest was done which showed no pneumonia.  Three-vessel coronary artery calcification was noted.  Patient was referred to me for further evaluation.  Carotid Doppler shows 50% stenosis of both carotid arteries.    Patient complain of chest pain but it is less frequent.  Patient complain of shortness of breath.  He is very nervous.  Patient denies any orthopnea, PND, syncope or presyncope.    At this stage I would recommend to proceed with stress test with Cardiolite imaging.  I would refer the patient to vascular surgery for carotid artery stenosis.  Continue aspirin.  Imdur 30 mg would be started.  Further recommendation after  results of stress test    Past Medical History:   Diagnosis Date   • B12 deficiency 2/2/2015   • Broken finger     broken middle finger   • Coronary artery disease with angina pectoris (CMS/HCC) 7/2/2019   • Gout    • Heart attack (CMS/HCC) 2017   • Hx of migraines    • Hyperlipidemia    • Hypertension    • Plantar fasciitis, left 1/9/2017         Past Surgical History:   Procedure Laterality Date   • ANKLE SURGERY Left 2001   • CARDIAC CATHETERIZATION  2017   • CARPAL TUNNEL RELEASE Bilateral    • CATARACT EXTRACTION  2015   • HERNIA REPAIR     • OTHER SURGICAL HISTORY  2017    MI with Stent 2010, 2017    • SPLENECTOMY             Current Outpatient Medications:   •  aspirin 81 MG EC tablet, Take 81 mg by mouth Daily., Disp: , Rfl:   •  atorvastatin (LIPITOR) 20 MG tablet, Take 20 mg by mouth Daily., Disp: , Rfl: 5  •  CARTIA  MG 24 hr capsule, Take 240 mg by mouth Daily., Disp: , Rfl: 3  •  cyclobenzaprine (FLEXERIL) 10 MG tablet, Take 10 mg by mouth 2 (Two) Times a Day., Disp: , Rfl: 0  •  Diclofenac Sodium 1.5 % solution, APPLY TOPICALLY AS DIRECTED DAILY AS NEEDED FOR 30 DAYS, Disp: , Rfl: 0  •  fenofibrate (TRICOR) 145 MG tablet, Take 145 mg by mouth Daily., Disp: , Rfl:   •  fenofibrate micronized (LOFIBRA) 134 MG capsule, TAKE 1 CAPSULE BY MOUTH ONCE DAILY, Disp: 30 capsule, Rfl: 3  •  glyBURIDE (DIAbeta) 5 MG tablet, Take 10 mg by mouth 2 (Two) Times a Day., Disp: , Rfl: 3  •  hydrochlorothiazide (MICROZIDE) 12.5 MG capsule, TAKE 1 CAPSULE BY MOUTH ONCE DAILY, Disp: 30 capsule, Rfl: 3  •  ipratropium-albuterol (DUO-NEB) 0.5-2.5 mg/3 ml nebulizer, Take 3 mL by nebulization Every 4 (Four) Hours As Needed for Wheezing., Disp: 360 mL, Rfl: 3  •  JARDIANCE 25 MG tablet, Take 25 mg by mouth Daily., Disp: , Rfl: 6  •  lidocaine (XYLOCAINE) 5 % ointment, USE OINTMENT EXTERNALLY THREE TIMES A DAY FOR 30 DAYS AS NEEDED, Disp: , Rfl: 0  •  losartan (COZAAR) 50 MG tablet, Take 100 mg by mouth Daily., Disp: , Rfl:    •  LYRICA 100 MG capsule, Take 100 mg by mouth 2 (Two) Times a Day As Needed (patient states only takes as needed)., Disp: , Rfl: 0  •  metFORMIN ER (GLUCOPHAGE-XR) 500 MG 24 hr tablet, Take 1,000 mg by mouth 2 (Two) Times a Day., Disp: , Rfl: 6  •  montelukast (SINGULAIR) 10 MG tablet, Take 10 mg by mouth Daily., Disp: , Rfl: 5  •  oxyCODONE (ROXICODONE) 10 MG tablet, Take 10 mg by mouth Every 6 (Six) Hours As Needed (patient states only takes as needed)., Disp: , Rfl: 0  •  pantoprazole (PROTONIX) 40 MG EC tablet, Take 40 mg by mouth Daily. FOR 30 DAYS, Disp: , Rfl: 6  •  pioglitazone (ACTOS) 15 MG tablet, Take 15 mg by mouth Daily., Disp: , Rfl: 5  •  isosorbide mononitrate (IMDUR) 30 MG 24 hr tablet, Take 1 tablet by mouth Daily., Disp: 90 tablet, Rfl: 3  •  sulindac (CLINORIL) 200 MG tablet, Take 1 tablet by mouth 2 (Two) Times a Day., Disp: 60 tablet, Rfl: 1    Current Facility-Administered Medications:   •  ipratropium-albuterol (DUO-NEB) nebulizer solution 3 mL, 3 mL, Nebulization, 4x Daily - RT, Kavitha Teran MD      Social History     Socioeconomic History   • Marital status:      Spouse name: Not on file   • Number of children: Not on file   • Years of education: Not on file   • Highest education level: Not on file   Tobacco Use   • Smoking status: Former Smoker   Substance and Sexual Activity   • Alcohol use: No     Frequency: Never   • Drug use: No   • Sexual activity: Defer         Review of Systems   Constitution: Negative for chills and fever.   HENT: Negative for ear discharge and nosebleeds.    Eyes: Negative for discharge and redness.   Cardiovascular: Positive for chest pain. Negative for orthopnea, palpitations, paroxysmal nocturnal dyspnea and syncope.   Respiratory: Positive for shortness of breath. Negative for cough and wheezing.    Endocrine: Negative for heat intolerance.   Skin: Negative for rash.   Musculoskeletal: Positive for arthritis. Negative for myalgias.  "  Gastrointestinal: Negative for abdominal pain, melena, nausea and vomiting.   Genitourinary: Negative for dysuria and hematuria.   Neurological: Negative for dizziness, light-headedness, numbness and tremors.   Psychiatric/Behavioral: Negative for depression. The patient is not nervous/anxious.        Procedures    Procedures    No orders to display           Objective:    /74   Pulse 79   Ht 167.6 cm (66\")   Wt 98.4 kg (217 lb)   BMI 35.02 kg/m²         Physical Exam   Constitutional: He is oriented to person, place, and time. He appears well-developed and well-nourished.   HENT:   Head: Normocephalic and atraumatic.   Eyes: No scleral icterus.   Neck: No thyromegaly present.   Cardiovascular: Normal rate, regular rhythm and normal heart sounds. Exam reveals no gallop and no friction rub.   No murmur heard.  Pulmonary/Chest: Effort normal and breath sounds normal. No respiratory distress. He has no wheezes. He has no rales.   Abdominal: There is no tenderness.   Musculoskeletal: He exhibits no edema.   Lymphadenopathy:     He has no cervical adenopathy.   Neurological: He is alert and oriented to person, place, and time.   Skin: No rash noted. No erythema.   Psychiatric: He has a normal mood and affect.           Assessment:       Diagnosis Plan   1. Coronary artery disease with angina pectoris, unspecified vessel or lesion type, unspecified whether native or transplanted heart (CMS/HCC)  Stress Test With Myocardial Perfusion One Day    isosorbide mononitrate (IMDUR) 30 MG 24 hr tablet   2. Essential hypertension  Stress Test With Myocardial Perfusion One Day    isosorbide mononitrate (IMDUR) 30 MG 24 hr tablet   3. Mixed hyperlipidemia  Stress Test With Myocardial Perfusion One Day    isosorbide mononitrate (IMDUR) 30 MG 24 hr tablet   4. Bradycardia, sinus  Stress Test With Myocardial Perfusion One Day    isosorbide mononitrate (IMDUR) 30 MG 24 hr tablet            Plan:       I would recommend to " proceed with stress test.  Imdur 30 mg would be started.  Patient would be referred to vascular surgeon for further evaluation for cardiac carotid artery disease

## 2019-11-07 ENCOUNTER — TELEPHONE (OUTPATIENT)
Dept: ENDOCRINOLOGY | Facility: CLINIC | Age: 59
End: 2019-11-07

## 2019-11-07 ENCOUNTER — OFFICE VISIT (OUTPATIENT)
Dept: ENDOCRINOLOGY | Facility: CLINIC | Age: 59
End: 2019-11-07

## 2019-11-07 VITALS
SYSTOLIC BLOOD PRESSURE: 132 MMHG | OXYGEN SATURATION: 99 % | WEIGHT: 217 LBS | DIASTOLIC BLOOD PRESSURE: 75 MMHG | HEIGHT: 66 IN | BODY MASS INDEX: 34.87 KG/M2 | HEART RATE: 99 BPM

## 2019-11-07 DIAGNOSIS — E11.65 TYPE 2 DIABETES MELLITUS WITH HYPERGLYCEMIA, WITHOUT LONG-TERM CURRENT USE OF INSULIN (HCC): Primary | ICD-10-CM

## 2019-11-07 DIAGNOSIS — E78.2 MIXED HYPERLIPIDEMIA: ICD-10-CM

## 2019-11-07 DIAGNOSIS — E66.09 CLASS 2 OBESITY DUE TO EXCESS CALORIES WITHOUT SERIOUS COMORBIDITY WITH BODY MASS INDEX (BMI) OF 35.0 TO 35.9 IN ADULT: ICD-10-CM

## 2019-11-07 DIAGNOSIS — I10 ESSENTIAL HYPERTENSION: ICD-10-CM

## 2019-11-07 PROBLEM — E66.812 CLASS 2 OBESITY DUE TO EXCESS CALORIES WITHOUT SERIOUS COMORBIDITY WITH BODY MASS INDEX (BMI) OF 35.0 TO 35.9 IN ADULT: Status: ACTIVE | Noted: 2017-04-24

## 2019-11-07 PROCEDURE — 99214 OFFICE O/P EST MOD 30 MIN: CPT | Performed by: INTERNAL MEDICINE

## 2019-11-07 NOTE — PATIENT INSTRUCTIONS
Start Trulicity 0.75 mg once a week  Please call if you develop any abdominal pain or vomiting  Continue rest of the medications  Always bring blood sugar records for review  Always keep glucose source with you in case of low blood sugar make sure sugars above 100 while driving  Always get annual eye exam and flu vaccine  Follow-up in 3 to 4 months with labs.

## 2019-11-07 NOTE — PROGRESS NOTES
Endocrine Progress Note Outpatient     Patient Care Team:  Kavitha Teran MD as PCP - General  Kavitha Tearn MD as PCP - Family Medicine    Chief Complaint: Follow-up type 2 diabetes    HPI: 58-year-old male with history of type 2 diabetes, hypertension, hyperlipidemia and obesity is here for follow-up.  For type 2 diabetes he is currently on metformin 1000 g twice a day, Jardiance 25 mill grams once a day, glyburide 10 mill grams twice a day and pioglitazone 15 mg once a day.  He is trying to work on his diet and activity.  His blood sugars have improved but is still running high.  Hypertension: Well-controlled  Hyperlipidemia: He is currently on atorvastatin and fenofibrate.    Past Medical History:   Diagnosis Date   • B12 deficiency 2/2/2015   • Broken finger     broken middle finger   • Coronary artery disease with angina pectoris (CMS/[facility]) 7/2/2019   • Gout    • Heart attack (CMS/[facility]) 2017   • Hx of migraines    • Hyperlipidemia    • Hypertension    • Plantar fasciitis, left 1/9/2017       Social History     Socioeconomic History   • Marital status:      Spouse name: Not on file   • Number of children: Not on file   • Years of education: Not on file   • Highest education level: Not on file   Tobacco Use   • Smoking status: Former Smoker   Substance and Sexual Activity   • Alcohol use: No     Frequency: Never   • Drug use: No   • Sexual activity: Defer       Family History   Problem Relation Age of Onset   • Diabetes Paternal Grandmother    • Cancer Other        Allergies   Allergen Reactions   • Gabapentin Dizziness       ROS:   Constitutional:  Denies fatigue, tiredness.    Eyes:  Denies change in visual acuity   HENT:  Denies nasal congestion or sore throat   Respiratory: denies cough, shortness of breath.   Cardiovascular:  denies chest pain, edema   GI:  Denies abdominal pain, nausea, vomiting.   Musculoskeletal:  Denies back pain or joint pain   Integument:  Denies dry skin and rash    Neurologic:  Denies headache, focal weakness or sensory changes   Endocrine:  Denies polyuria or polydipsia   Psychiatric:  Denies depression or anxiety      Vitals:    11/07/19 1040   BP: 132/75   Pulse: 99   SpO2: 99%       Physical Exam:  GEN: NAD, conversant, obese  EYES: EOMI, PERRL, no conjunctival erythema  NECK: no thyromegaly, full ROM   CV: RRR, no murmurs/rubs/gallops, no peripheral edema  LUNG: CTAB, no wheezes/rales/ronchi  SKIN: no rashes, no acanthosis  MSK: no deformities, full ROM of all extremities  NEURO: no tremors, DTR normal  PSYCH: AOX3, appropriate mood, affect normal      Results Review:     I reviewed the patient's new clinical results.    Lab Results   Component Value Date    HGBA1C 9.6 (H) 10/08/2019    HGBA1C 10.3 (H) 07/22/2019      Lab Results   Component Value Date    GLUCOSE 181 (H) 10/08/2019    BUN 14 10/08/2019    CREATININE 1.10 10/08/2019    EGFRIFNONA 69 10/08/2019    BCR 12.7 10/08/2019    K 4.2 10/08/2019    CO2 26.0 10/08/2019    CALCIUM 9.6 10/08/2019    ALBUMIN 4.30 10/08/2019    LABIL2 1.1 02/08/2019    AST 24 10/08/2019    ALT 28 10/08/2019    CHOL 152 10/08/2019    TRIG 195 (H) 10/08/2019    LDL 95 10/08/2019    HDL 29 (L) 10/08/2019     Lab Results   Component Value Date    TSH 1.770 10/08/2019         Medication Review: Reviewed.       Current Outpatient Medications:   •  aspirin 81 MG EC tablet, Take 81 mg by mouth Daily., Disp: , Rfl:   •  atorvastatin (LIPITOR) 20 MG tablet, Take 20 mg by mouth Daily., Disp: , Rfl: 5  •  CARTIA  MG 24 hr capsule, Take 240 mg by mouth Daily., Disp: , Rfl: 3  •  cyclobenzaprine (FLEXERIL) 10 MG tablet, Take 10 mg by mouth 2 (Two) Times a Day., Disp: , Rfl: 0  •  Diclofenac Sodium 1.5 % solution, APPLY TOPICALLY AS DIRECTED DAILY AS NEEDED FOR 30 DAYS, Disp: , Rfl: 0  •  fenofibrate (TRICOR) 145 MG tablet, Take 1 tablet by mouth Daily., Disp: 90 tablet, Rfl: 3  •  glyBURIDE (DIAbeta) 5 MG tablet, Take 10 mg by mouth 2 (Two)  Times a Day., Disp: , Rfl: 3  •  hydrochlorothiazide (MICROZIDE) 12.5 MG capsule, TAKE 1 CAPSULE BY MOUTH ONCE DAILY, Disp: 30 capsule, Rfl: 3  •  ipratropium-albuterol (DUO-NEB) 0.5-2.5 mg/3 ml nebulizer, Take 3 mL by nebulization Every 4 (Four) Hours As Needed for Wheezing., Disp: 360 mL, Rfl: 3  •  isosorbide mononitrate (IMDUR) 30 MG 24 hr tablet, Take 1 tablet by mouth Daily., Disp: 90 tablet, Rfl: 3  •  JARDIANCE 25 MG tablet, Take 25 mg by mouth Daily., Disp: , Rfl: 6  •  lidocaine (XYLOCAINE) 5 % ointment, USE OINTMENT EXTERNALLY THREE TIMES A DAY FOR 30 DAYS AS NEEDED, Disp: , Rfl: 0  •  losartan (COZAAR) 50 MG tablet, Take 100 mg by mouth Daily., Disp: , Rfl:   •  LYRICA 100 MG capsule, Take 100 mg by mouth 2 (Two) Times a Day As Needed (patient states only takes as needed)., Disp: , Rfl: 0  •  metFORMIN ER (GLUCOPHAGE-XR) 500 MG 24 hr tablet, Take 1,000 mg by mouth 2 (Two) Times a Day., Disp: , Rfl: 6  •  montelukast (SINGULAIR) 10 MG tablet, Take 10 mg by mouth Daily., Disp: , Rfl: 5  •  oxyCODONE (ROXICODONE) 10 MG tablet, Take 10 mg by mouth Every 6 (Six) Hours As Needed (patient states only takes as needed)., Disp: , Rfl: 0  •  pantoprazole (PROTONIX) 40 MG EC tablet, Take 40 mg by mouth Daily. FOR 30 DAYS, Disp: , Rfl: 6  •  pioglitazone (ACTOS) 15 MG tablet, Take 15 mg by mouth Daily., Disp: , Rfl: 5  •  sulindac (CLINORIL) 200 MG tablet, Take 1 tablet by mouth 2 (Two) Times a Day., Disp: 60 tablet, Rfl: 1    Current Facility-Administered Medications:   •  ipratropium-albuterol (DUO-NEB) nebulizer solution 3 mL, 3 mL, Nebulization, 4x Daily - RT, Kavitha Teran MD      Assessment/Plan   1.  Diabetes mellitus type 2: Uncontrolled with A1c of 9.6%.  At this time I will add Trulicity 0.75 mg once a week injection.  Effects of nausea, abdominal pain and vomiting discussed with him.  He is advised to call if he develops any abdominal pain or vomiting however nausea will get better with time.  2.   Hypertension: Well-controlled, continue current medication  3.  Hyperlipidemia: His LDL is 95 with triglycerides 195, I will increase atorvastatin to 40 mg p.o. daily.  Will follow lipid panel.  4.  Obesity: Advised to continue to work on diet and activity.            Miki Man MD FACE.    Much of the above report is an electronic transcription/translation of the spoken language to printed text using Dragon Software. As such, the subtleties and finesse of the spoken language may permit erroneous, or at times, nonsensical words or phrases to be inadvertently transcribed; thus changes may be made at a later date to rectify these errors.

## 2019-11-07 NOTE — TELEPHONE ENCOUNTER
Try Ozempic 0.25 mg subcu once a week for 4 weeks then increase to 0.5 mg subcu weekly.  Please tell him that he will need to get an eye exam and next 1 to 2 months.

## 2019-11-11 ENCOUNTER — OFFICE VISIT (OUTPATIENT)
Dept: ONCOLOGY | Facility: CLINIC | Age: 59
End: 2019-11-11

## 2019-11-11 ENCOUNTER — APPOINTMENT (OUTPATIENT)
Dept: LAB | Facility: HOSPITAL | Age: 59
End: 2019-11-11

## 2019-11-11 ENCOUNTER — APPOINTMENT (OUTPATIENT)
Dept: NUCLEAR MEDICINE | Facility: HOSPITAL | Age: 59
End: 2019-11-11

## 2019-11-11 VITALS
HEIGHT: 66 IN | HEART RATE: 90 BPM | DIASTOLIC BLOOD PRESSURE: 75 MMHG | TEMPERATURE: 98.5 F | RESPIRATION RATE: 20 BRPM | SYSTOLIC BLOOD PRESSURE: 144 MMHG | BODY MASS INDEX: 34.65 KG/M2 | WEIGHT: 215.6 LBS

## 2019-11-11 DIAGNOSIS — D72.829 LEUKOCYTOSIS, UNSPECIFIED TYPE: Primary | ICD-10-CM

## 2019-11-11 LAB
BASOPHILS # BLD AUTO: 0.07 10*3/MM3 (ref 0–0.2)
BASOPHILS NFR BLD AUTO: 0.5 % (ref 0–1.5)
DEPRECATED RDW RBC AUTO: 46.4 FL (ref 37–54)
EOSINOPHIL # BLD AUTO: 0.14 10*3/MM3 (ref 0–0.4)
EOSINOPHIL NFR BLD AUTO: 1 % (ref 0.3–6.2)
ERYTHROCYTE [DISTWIDTH] IN BLOOD BY AUTOMATED COUNT: 13.8 % (ref 12.3–15.4)
HCT VFR BLD AUTO: 48.5 % (ref 37.5–51)
HGB BLD-MCNC: 16.1 G/DL (ref 13–17.7)
LYMPHOCYTES # BLD AUTO: 4.33 10*3/MM3 (ref 0.7–3.1)
LYMPHOCYTES NFR BLD AUTO: 31.4 % (ref 19.6–45.3)
MCH RBC QN AUTO: 31.3 PG (ref 26.6–33)
MCHC RBC AUTO-ENTMCNC: 33.2 G/DL (ref 31.5–35.7)
MCV RBC AUTO: 94.2 FL (ref 79–97)
MONOCYTES # BLD AUTO: 1.51 10*3/MM3 (ref 0.1–0.9)
MONOCYTES NFR BLD AUTO: 11 % (ref 5–12)
NEUTROPHILS # BLD AUTO: 7.72 10*3/MM3 (ref 1.7–7)
NEUTROPHILS NFR BLD AUTO: 56.1 % (ref 42.7–76)
PLATELET # BLD AUTO: 436 10*3/MM3 (ref 140–450)
PMV BLD AUTO: 9.2 FL (ref 6–12)
RBC # BLD AUTO: 5.15 10*6/MM3 (ref 4.14–5.8)
WBC NRBC COR # BLD: 13.77 10*3/MM3 (ref 3.4–10.8)

## 2019-11-11 PROCEDURE — 99214 OFFICE O/P EST MOD 30 MIN: CPT | Performed by: INTERNAL MEDICINE

## 2019-11-11 PROCEDURE — 85025 COMPLETE CBC W/AUTO DIFF WBC: CPT | Performed by: INTERNAL MEDICINE

## 2019-11-11 NOTE — PROGRESS NOTES
Hematology/Oncology Outpatient Follow Up    PATIENT NAME:Dawson Lomax  :1960  MRN: [id number]  PRIMARY CARE PHYSICIAN: Kavitha Teran MD  REFERRING PHYSICIAN: Kavitha Teran MD    Chief Complaint   Patient presents with   • Follow-up     Leukocytosis        HISTORY OF PRESENT ILLNESS:   This is a 58-year-old male who is here due to two to three month history of night sweats.  Patient denies any fevers or chills.  He has chest congestion for which he had a chest x-ray which was essentially clear.  He denies any other symptoms such as weight loss or fatigue symptoms.  He still has a cough which is productive of clear sputum.  He has no urinary symptoms.  He denies nausea, vomiting or diarrhea.  During his work up for the above complaint he had a CBC done on 3/27/15 which showed a white count of 14.7, hemoglobin 14.4, platelet count of 421,000.  His differentials were 48% neutrophils, 37% lymphocytes, 12% monocytes.  He has no basophilia or eosinophilia noted on his differential count.  He had mild monocytosis and also mild absolute lymphocytosis.  He is alone today for this appointment.    • 2015 - CT scan of the abdomen and pelvis which showed moderate stool and post splenectomy.  Otherwise negative.    • 2015 - Chest x-ray, essentially normal with no acute abnormalities identified.    • 4/1/15 - Patient had further testing including BCR-abl, which was negative for mutation.  His flow cytometry was negative for lymphoproliferative disease.  JORY-2 was negative.  Acute viral hepatitis panel was negative.  He had a normal B12 level, sed rate and LDH.  Uric acid was also normal and his urinalysis was negative for any infection.  His repeat CBC on the same day showed WBC count of 12.9, hemoglobin 13.8, platelet count 414,000.   • 6/10/15 - CT scan of the chest without contrast.  This did not reveal any pulmonary mass.  He has increased upper abdominal lymph nodes, but within  normal limits for size.    • 2/2/17 - Patient was admitted to the hospital for acute coronary syndrome.  Patient apparently had a myocardiac infarction.  He had one stent placed during hospitalization.  I saw him in the past for leukocytosis with negative work up.    • 2/28/17 - WBC 15.5, hemoglobin 13.7, platelet count 425,000.  Differentials were 47% neutrophils, 37% lymphocytes.  There was mild monocytosis at 12% [range 2-11].    • Labs since last visit of 3/1/17 - BCR-abl negative.  B12 (N) 541.  LDH (N) 178.  Uric acid (L) 3.8.    • 3/9/17 - Peripheral blood flow cytometry was negative.  JORY-2 was negative for mutation.    • 3/13/17 - Serum PSA 0.60.   • 2/28/18 - CT scan of the chest showed fatty infiltration of the liver.  There was a small 2 to 3 mm pleural based nodule posteriorly and laterally in the right lower lobe, stable from prior CT scan of 2017.   • 10/19/19-CT of the chest showed small subpleural 3 mm right lower lobe nodule.  Stable 4 mm subpleural right lower lobe nodule.    Past Medical History:   Diagnosis Date   • B12 deficiency 2/2/2015   • Broken finger     broken middle finger   • Coronary artery disease with angina pectoris (CMS/HCC) 7/2/2019   • Gout    • Heart attack (CMS/HCC) 2017   • Hx of migraines    • Hyperlipidemia    • Hypertension    • Plantar fasciitis, left 1/9/2017       Past Surgical History:   Procedure Laterality Date   • ANKLE SURGERY Left 2001   • CARDIAC CATHETERIZATION  2017   • CARPAL TUNNEL RELEASE Bilateral    • CATARACT EXTRACTION  2015   • HERNIA REPAIR     • OTHER SURGICAL HISTORY  2017    MI with Stent 2010, 2017    • SPLENECTOMY           Current Outpatient Medications:   •  aspirin 81 MG EC tablet, Take 81 mg by mouth Daily., Disp: , Rfl:   •  atorvastatin (LIPITOR) 20 MG tablet, Take 20 mg by mouth Daily., Disp: , Rfl: 5  •  CARTIA  MG 24 hr capsule, Take 240 mg by mouth Daily., Disp: , Rfl: 3  •  cyclobenzaprine (FLEXERIL) 10 MG tablet, Take 10 mg by  mouth 2 (Two) Times a Day., Disp: , Rfl: 0  •  Diclofenac Sodium 1.5 % solution, APPLY TOPICALLY AS DIRECTED DAILY AS NEEDED FOR 30 DAYS, Disp: , Rfl: 0  •  fenofibrate (TRICOR) 145 MG tablet, Take 1 tablet by mouth Daily. (Patient taking differently: Take 145 mg by mouth Daily. Also taking 135mg tablet in AM), Disp: 90 tablet, Rfl: 3  •  glyBURIDE (DIAbeta) 5 MG tablet, Take 10 mg by mouth 2 (Two) Times a Day., Disp: , Rfl: 3  •  hydrochlorothiazide (MICROZIDE) 12.5 MG capsule, TAKE 1 CAPSULE BY MOUTH ONCE DAILY, Disp: 30 capsule, Rfl: 3  •  ipratropium-albuterol (DUO-NEB) 0.5-2.5 mg/3 ml nebulizer, Take 3 mL by nebulization Every 4 (Four) Hours As Needed for Wheezing., Disp: 360 mL, Rfl: 3  •  isosorbide mononitrate (IMDUR) 30 MG 24 hr tablet, Take 1 tablet by mouth Daily., Disp: 90 tablet, Rfl: 3  •  JARDIANCE 25 MG tablet, Take 25 mg by mouth Daily., Disp: , Rfl: 6  •  lidocaine (XYLOCAINE) 5 % ointment, USE OINTMENT EXTERNALLY THREE TIMES A DAY FOR 30 DAYS AS NEEDED, Disp: , Rfl: 0  •  losartan (COZAAR) 50 MG tablet, Take 100 mg by mouth Daily., Disp: , Rfl:   •  LYRICA 100 MG capsule, Take 100 mg by mouth 2 (Two) Times a Day As Needed (patient states only takes as needed)., Disp: , Rfl: 0  •  metFORMIN ER (GLUCOPHAGE-XR) 500 MG 24 hr tablet, Take 1,000 mg by mouth 2 (Two) Times a Day., Disp: , Rfl: 6  •  montelukast (SINGULAIR) 10 MG tablet, Take 10 mg by mouth Daily., Disp: , Rfl: 5  •  oxyCODONE (ROXICODONE) 10 MG tablet, Take 10 mg by mouth Every 6 (Six) Hours As Needed (patient states only takes as needed)., Disp: , Rfl: 0  •  pantoprazole (PROTONIX) 40 MG EC tablet, Take 40 mg by mouth Daily. FOR 30 DAYS, Disp: , Rfl: 6  •  pioglitazone (ACTOS) 15 MG tablet, Take 15 mg by mouth Daily., Disp: , Rfl: 5  •  Semaglutide,0.25 or 0.5MG/DOS, (OZEMPIC, 0.25 OR 0.5 MG/DOSE,) 2 MG/1.5ML solution pen-injector, 0.25 mg subcu once a week for 4 weeks then increase to 0.5 mg subcu weekly., Disp: 1.5 mL, Rfl: 3  •   sulindac (CLINORIL) 200 MG tablet, Take 1 tablet by mouth 2 (Two) Times a Day., Disp: 60 tablet, Rfl: 1    Current Facility-Administered Medications:   •  ipratropium-albuterol (DUO-NEB) nebulizer solution 3 mL, 3 mL, Nebulization, 4x Daily - RT, Kavitha Teran MD    Allergies   Allergen Reactions   • Gabapentin Dizziness       Family History   Problem Relation Age of Onset   • Diabetes Paternal Grandmother    • Cancer Other        Cancer-related family history includes Cancer in an other family member.    Social History     Tobacco Use   • Smoking status: Former Smoker   Substance Use Topics   • Alcohol use: No     Frequency: Never   • Drug use: No       I have reviewed the history of present illness, past medical history, family history, social history, lab results, all notes and other records since the patient was last seen on Visit date not found.    SUBJECTIVE:  The patient is here for a follow up appointment.  He states that he is doing well.  The patient denies the desire to have a flu vaccination.            REVIEW OF SYSTEMS:  Review of Systems   Constitutional: Negative for chills and fever.   HENT: Negative for ear pain, mouth sores, nosebleeds and sore throat.    Eyes: Negative for photophobia and visual disturbance.   Respiratory: Negative for wheezing and stridor.    Cardiovascular: Negative for chest pain and palpitations.   Gastrointestinal: Negative for abdominal pain, diarrhea, nausea and vomiting.   Endocrine: Negative for cold intolerance and heat intolerance.   Genitourinary: Negative for dysuria and hematuria.   Musculoskeletal: Negative for joint swelling and neck stiffness.   Skin: Negative for color change and rash.   Neurological: Negative for seizures and syncope.   Hematological: Negative for adenopathy.        No obvious bleeding   Psychiatric/Behavioral: Negative for agitation, confusion and hallucinations.       OBJECTIVE:    Vitals:    11/11/19 1534   BP: 144/75   Pulse: 90  "  Resp: 20   Temp: 98.5 °F (36.9 °C)   Weight: 97.8 kg (215 lb 9.6 oz)   Height: 167.6 cm (66\")   PainSc: 0-No pain       ECOG  (0) Fully active, able to carry on all predisease performance without restriction    Physical Exam   Constitutional: He is oriented to person, place, and time. No distress.   HENT:   Head: Normocephalic and atraumatic.   Eyes: Conjunctivae and EOM are normal. Right eye exhibits no discharge. Left eye exhibits no discharge. No scleral icterus.   Neck: Normal range of motion. Neck supple. No thyromegaly present.   Cardiovascular: Normal rate, regular rhythm and normal heart sounds. Exam reveals no gallop and no friction rub.   Pulmonary/Chest: Effort normal. No stridor. No respiratory distress. He has no wheezes.   Abdominal: Soft. Bowel sounds are normal. He exhibits no mass. There is no tenderness. There is no rebound and no guarding.   Musculoskeletal: Normal range of motion. He exhibits no tenderness.   Lymphadenopathy:     He has no cervical adenopathy.   Neurological: He is alert and oriented to person, place, and time. He exhibits normal muscle tone.   Skin: Skin is warm. No rash noted. He is not diaphoretic. No erythema.   Psychiatric: He has a normal mood and affect. His behavior is normal.   Nursing note and vitals reviewed.      RECENT LABS  WBC   Date Value Ref Range Status   11/11/2019 13.77 (H) 3.40 - 10.80 10*3/mm3 Final     RBC   Date Value Ref Range Status   11/11/2019 5.15 4.14 - 5.80 10*6/mm3 Final     Hemoglobin   Date Value Ref Range Status   11/11/2019 16.1 13.0 - 17.7 g/dL Final     Hematocrit   Date Value Ref Range Status   11/11/2019 48.5 37.5 - 51.0 % Final     MCV   Date Value Ref Range Status   11/11/2019 94.2 79.0 - 97.0 fL Final     MCH   Date Value Ref Range Status   11/11/2019 31.3 26.6 - 33.0 pg Final     MCHC   Date Value Ref Range Status   11/11/2019 33.2 31.5 - 35.7 g/dL Final     RDW   Date Value Ref Range Status   11/11/2019 13.8 12.3 - 15.4 % Final "     RDW-SD   Date Value Ref Range Status   11/11/2019 46.4 37.0 - 54.0 fl Final     MPV   Date Value Ref Range Status   11/11/2019 9.2 6.0 - 12.0 fL Final     Platelets   Date Value Ref Range Status   11/11/2019 436 140 - 450 10*3/mm3 Final     Neutrophil %   Date Value Ref Range Status   11/11/2019 56.1 42.7 - 76.0 % Final     Lymphocyte %   Date Value Ref Range Status   11/11/2019 31.4 19.6 - 45.3 % Final     Monocyte %   Date Value Ref Range Status   11/11/2019 11.0 5.0 - 12.0 % Final     Eosinophil %   Date Value Ref Range Status   11/11/2019 1.0 0.3 - 6.2 % Final     Basophil %   Date Value Ref Range Status   11/11/2019 0.5 0.0 - 1.5 % Final     Neutrophils, Absolute   Date Value Ref Range Status   11/11/2019 7.72 (H) 1.70 - 7.00 10*3/mm3 Final     Lymphocytes, Absolute   Date Value Ref Range Status   11/11/2019 4.33 (H) 0.70 - 3.10 10*3/mm3 Final     Monocytes, Absolute   Date Value Ref Range Status   11/11/2019 1.51 (H) 0.10 - 0.90 10*3/mm3 Final     Eosinophils, Absolute   Date Value Ref Range Status   11/11/2019 0.14 0.00 - 0.40 10*3/mm3 Final     Basophils, Absolute   Date Value Ref Range Status   11/11/2019 0.07 0.00 - 0.20 10*3/mm3 Final     nRBC   Date Value Ref Range Status   10/08/2019 0.1 0.0 - 0.2 /100 WBC Final       Lab Results   Component Value Date    GLUCOSE 181 (H) 10/08/2019    BUN 14 10/08/2019    CREATININE 1.10 10/08/2019    EGFRIFNONA 69 10/08/2019    BCR 12.7 10/08/2019    K 4.2 10/08/2019    CO2 26.0 10/08/2019    CALCIUM 9.6 10/08/2019    ALBUMIN 4.30 10/08/2019    LABIL2 1.1 02/08/2019    AST 24 10/08/2019    ALT 28 10/08/2019         Assessment/Plan     Leukocytosis, unspecified type  - CBC & Differential  - CBC Auto Differential      ASSESSMENT:    1. Leukocytosis likely reactive and also post splenectomy with negative peripheral work up so far.    2. Night sweats, significantly improved.  Previous CT scan of the chest, abdomen and pelvis did not reveal any significant  abnormalities.     3. History of right pulmonary nodules. Stable on recent CT chest done 10/19/19    PLAN:     Patient has persistent leukocytosis secondary to post splenectomy state, stable.  For his history of pulmonary nodules, I have recommended a CT scan of the chest without contrast to further evaluate.  I will otherwise see him back in six months, but will watch for the results of the CT scan once done.  He will continue to follow up with Dr. Teran, his primary care physician.  I have also encouraged him to continue to follow up with ENT for his hoarseness. Patient states he was seen a few months ago. Will request for records.  Patient has a history of smoking and I explained the importance of keeping up with the above appointment.           I have reviewed labs results, imaging, vitals, and medications with the patient today. Will follow up in 6 months with me with CT chest done a few days before..      Patient verbalized understanding and is in agreement of the above plan.    Part of this document was scribed by Viky Levin RN, BSN.

## 2019-12-18 RX ORDER — ATORVASTATIN CALCIUM 20 MG/1
TABLET, FILM COATED ORAL
Qty: 30 TABLET | Refills: 5 | Status: SHIPPED | OUTPATIENT
Start: 2019-12-18 | End: 2020-05-19 | Stop reason: SDUPTHER

## 2019-12-26 RX ORDER — HYDROCHLOROTHIAZIDE 12.5 MG/1
CAPSULE, GELATIN COATED ORAL
Qty: 30 CAPSULE | Refills: 2 | Status: SHIPPED | OUTPATIENT
Start: 2019-12-26 | End: 2020-03-16

## 2020-01-07 ENCOUNTER — APPOINTMENT (OUTPATIENT)
Dept: VASCULAR SURGERY | Facility: HOSPITAL | Age: 60
End: 2020-01-07

## 2020-01-07 PROBLEM — I65.23 CAROTID STENOSIS, ASYMPTOMATIC, BILATERAL: Status: ACTIVE | Noted: 2020-01-07

## 2020-01-07 PROCEDURE — G0463 HOSPITAL OUTPT CLINIC VISIT: HCPCS

## 2020-01-13 ENCOUNTER — TRANSCRIBE ORDERS (OUTPATIENT)
Dept: ADMINISTRATIVE | Facility: HOSPITAL | Age: 60
End: 2020-01-13

## 2020-01-13 DIAGNOSIS — I65.23 BILATERAL CAROTID ARTERY OCCLUSION: Primary | ICD-10-CM

## 2020-01-20 RX ORDER — LOSARTAN POTASSIUM 100 MG/1
TABLET ORAL
Qty: 90 TABLET | Refills: 0 | OUTPATIENT
Start: 2020-01-20

## 2020-01-20 RX ORDER — LOSARTAN POTASSIUM 100 MG/1
100 TABLET ORAL DAILY
Qty: 90 TABLET | Refills: 3 | Status: SHIPPED | OUTPATIENT
Start: 2020-01-20 | End: 2021-01-21

## 2020-01-20 RX ORDER — PIOGLITAZONEHYDROCHLORIDE 15 MG/1
TABLET ORAL
Qty: 30 TABLET | Refills: 2 | Status: SHIPPED | OUTPATIENT
Start: 2020-01-20 | End: 2020-05-19

## 2020-02-10 RX ORDER — EMPAGLIFLOZIN 25 MG/1
TABLET, FILM COATED ORAL
Qty: 30 TABLET | Refills: 2 | Status: SHIPPED | OUTPATIENT
Start: 2020-02-10 | End: 2020-04-22 | Stop reason: SDUPTHER

## 2020-02-21 ENCOUNTER — LAB (OUTPATIENT)
Dept: LAB | Facility: HOSPITAL | Age: 60
End: 2020-02-21

## 2020-02-21 DIAGNOSIS — I10 ESSENTIAL HYPERTENSION: ICD-10-CM

## 2020-02-21 DIAGNOSIS — E11.65 TYPE 2 DIABETES MELLITUS WITH HYPERGLYCEMIA, WITHOUT LONG-TERM CURRENT USE OF INSULIN (HCC): ICD-10-CM

## 2020-02-21 DIAGNOSIS — E78.2 MIXED HYPERLIPIDEMIA: ICD-10-CM

## 2020-02-21 DIAGNOSIS — E66.09 CLASS 2 OBESITY DUE TO EXCESS CALORIES WITHOUT SERIOUS COMORBIDITY WITH BODY MASS INDEX (BMI) OF 35.0 TO 35.9 IN ADULT: ICD-10-CM

## 2020-02-21 LAB
ALBUMIN SERPL-MCNC: 4.4 G/DL (ref 3.5–5.2)
ALBUMIN UR-MCNC: 1.8 MG/DL
ALBUMIN/GLOB SERPL: 1.5 G/DL
ALP SERPL-CCNC: 66 U/L (ref 39–117)
ALT SERPL W P-5'-P-CCNC: 22 U/L (ref 1–41)
ANION GAP SERPL CALCULATED.3IONS-SCNC: 14.2 MMOL/L (ref 5–15)
AST SERPL-CCNC: 18 U/L (ref 1–40)
BILIRUB SERPL-MCNC: 0.3 MG/DL (ref 0.2–1.2)
BUN BLD-MCNC: 13 MG/DL (ref 6–20)
BUN/CREAT SERPL: 14.3 (ref 7–25)
CALCIUM SPEC-SCNC: 9.4 MG/DL (ref 8.6–10.5)
CHLORIDE SERPL-SCNC: 103 MMOL/L (ref 98–107)
CHOLEST SERPL-MCNC: 154 MG/DL (ref 0–200)
CO2 SERPL-SCNC: 22.8 MMOL/L (ref 22–29)
CREAT BLD-MCNC: 0.91 MG/DL (ref 0.76–1.27)
CREAT UR-MCNC: 57.2 MG/DL
GFR SERPL CREATININE-BSD FRML MDRD: 85 ML/MIN/1.73
GLOBULIN UR ELPH-MCNC: 2.9 GM/DL
GLUCOSE BLD-MCNC: 136 MG/DL (ref 65–99)
HBA1C MFR BLD: 8.5 % (ref 3.5–5.6)
HDLC SERPL-MCNC: 31 MG/DL (ref 40–60)
LDLC SERPL CALC-MCNC: 73 MG/DL (ref 0–100)
LDLC/HDLC SERPL: 2.35 {RATIO}
MICROALBUMIN/CREAT UR: 31.5 MG/G
POTASSIUM BLD-SCNC: 3.9 MMOL/L (ref 3.5–5.2)
PROT SERPL-MCNC: 7.3 G/DL (ref 6–8.5)
SODIUM BLD-SCNC: 140 MMOL/L (ref 136–145)
TRIGL SERPL-MCNC: 251 MG/DL (ref 0–150)
VLDLC SERPL-MCNC: 50.2 MG/DL (ref 5–40)

## 2020-02-21 PROCEDURE — 83036 HEMOGLOBIN GLYCOSYLATED A1C: CPT

## 2020-02-21 PROCEDURE — 80061 LIPID PANEL: CPT

## 2020-02-21 PROCEDURE — 80053 COMPREHEN METABOLIC PANEL: CPT

## 2020-02-21 PROCEDURE — 82043 UR ALBUMIN QUANTITATIVE: CPT

## 2020-02-21 PROCEDURE — 82570 ASSAY OF URINE CREATININE: CPT

## 2020-02-21 PROCEDURE — 36415 COLL VENOUS BLD VENIPUNCTURE: CPT

## 2020-03-05 ENCOUNTER — OFFICE VISIT (OUTPATIENT)
Dept: ENDOCRINOLOGY | Facility: CLINIC | Age: 60
End: 2020-03-05

## 2020-03-05 VITALS
BODY MASS INDEX: 36 KG/M2 | HEIGHT: 66 IN | DIASTOLIC BLOOD PRESSURE: 70 MMHG | OXYGEN SATURATION: 98 % | HEART RATE: 81 BPM | SYSTOLIC BLOOD PRESSURE: 140 MMHG | WEIGHT: 224 LBS

## 2020-03-05 DIAGNOSIS — E66.09 CLASS 2 OBESITY DUE TO EXCESS CALORIES WITHOUT SERIOUS COMORBIDITY WITH BODY MASS INDEX (BMI) OF 35.0 TO 35.9 IN ADULT: ICD-10-CM

## 2020-03-05 DIAGNOSIS — E78.2 MIXED HYPERLIPIDEMIA: ICD-10-CM

## 2020-03-05 DIAGNOSIS — E11.65 TYPE 2 DIABETES MELLITUS WITH HYPERGLYCEMIA, WITHOUT LONG-TERM CURRENT USE OF INSULIN (HCC): Primary | ICD-10-CM

## 2020-03-05 DIAGNOSIS — I10 ESSENTIAL HYPERTENSION: ICD-10-CM

## 2020-03-05 PROCEDURE — 99214 OFFICE O/P EST MOD 30 MIN: CPT | Performed by: INTERNAL MEDICINE

## 2020-03-05 NOTE — PATIENT INSTRUCTIONS
Increase Ozempic to 1.0 mg subcu weekly  Always keep glucose source with you in case of low blood sugar  Please continue to work on your diet and activity  For your surgery and your blood sugar as long as below 180 on a consistent basis you can proceed with surgery.  Always get regular eye exam and flu vaccine  Follow-up in 4 months with labs.

## 2020-03-05 NOTE — PROGRESS NOTES
Endocrine Progress Note Outpatient     Patient Care Team:  Kavitha Teran MD as PCP - General  Kavitha Teran MD as PCP - Family Medicine    Chief Complaint: Follow-up type 2 diabetes    HPI: 59-year-old male with history of type 2 diabetes, hypertension, hyperlipidemia and obesity is here for follow-up.  For type 2 diabetes he is currently on metformin 1000 g twice a day, Jardiance 25 mill grams once a day, glyburide 10 mill grams twice a day, Ozempic 0.5 mg subcu once a week and pioglitazone 15 mg once a day.  He is trying to work on his diet and activity.  His blood sugars have improved but is still running high.  Hypertension: Well-controlled  Hyperlipidemia: He is currently on atorvastatin and fenofibrate.  Obesity: He is trying to work on diet.     Past Medical History:   Diagnosis Date   • B12 deficiency 2/2/2015   • Broken finger     broken middle finger   • Coronary artery disease with angina pectoris (CMS/[facility]) 7/2/2019   • Gout    • Heart attack (CMS/[facility]) 2017   • Hx of migraines    • Hyperlipidemia    • Hypertension    • Plantar fasciitis, left 1/9/2017       Social History     Socioeconomic History   • Marital status:      Spouse name: Not on file   • Number of children: Not on file   • Years of education: Not on file   • Highest education level: Not on file   Tobacco Use   • Smoking status: Former Smoker   Substance and Sexual Activity   • Alcohol use: No     Frequency: Never   • Drug use: No   • Sexual activity: Defer       Family History   Problem Relation Age of Onset   • Diabetes Paternal Grandmother    • Cancer Other        Allergies   Allergen Reactions   • Gabapentin Dizziness       ROS:   Constitutional:  Denies fatigue, tiredness.    Eyes:  Denies change in visual acuity   HENT:  Denies nasal congestion or sore throat   Respiratory: denies cough, shortness of breath.   Cardiovascular:  denies chest pain, edema   GI:  Denies abdominal pain, nausea, vomiting.    Musculoskeletal:  Denies back pain or joint pain   Integument:  Denies dry skin and rash   Neurologic:  Denies headache, focal weakness or sensory changes   Endocrine:  Denies polyuria or polydipsia   Psychiatric:  Denies depression or anxiety      Vitals:    03/05/20 0853   BP: 140/70   Pulse: 81   SpO2: 98%       Physical Exam:  GEN: NAD, conversant, obese  EYES: EOMI, PERRL, no conjunctival erythema  NECK: no thyromegaly, full ROM   CV: RRR, no murmurs/rubs/gallops, no peripheral edema  LUNG: CTAB, no wheezes/rales/ronchi  SKIN: no rashes, no acanthosis  MSK: no deformities, full ROM of all extremities  NEURO: no tremors, DTR normal  PSYCH: AOX3, appropriate mood, affect normal      Results Review:     I reviewed the patient's new clinical results.    Lab Results   Component Value Date    HGBA1C 8.5 (H) 02/21/2020    HGBA1C 9.6 (H) 10/08/2019    HGBA1C 10.3 (H) 07/22/2019      Lab Results   Component Value Date    GLUCOSE 136 (H) 02/21/2020    BUN 13 02/21/2020    CREATININE 0.91 02/21/2020    EGFRIFNONA 85 02/21/2020    BCR 14.3 02/21/2020    K 3.9 02/21/2020    CO2 22.8 02/21/2020    CALCIUM 9.4 02/21/2020    ALBUMIN 4.40 02/21/2020    LABIL2 1.1 02/08/2019    AST 18 02/21/2020    ALT 22 02/21/2020    CHOL 154 02/21/2020    TRIG 251 (H) 02/21/2020    LDL 73 02/21/2020    HDL 31 (L) 02/21/2020     Lab Results   Component Value Date    TSH 1.770 10/08/2019         Medication Review: Reviewed.       Current Outpatient Medications:   •  aspirin 81 MG EC tablet, Take 81 mg by mouth Daily., Disp: , Rfl:   •  atorvastatin (LIPITOR) 20 MG tablet, TAKE 1 TABLET BY MOUTH ONCE DAILY, Disp: 30 tablet, Rfl: 5  •  CARTIA  MG 24 hr capsule, Take 240 mg by mouth Daily., Disp: , Rfl: 3  •  cyclobenzaprine (FLEXERIL) 10 MG tablet, Take 10 mg by mouth 2 (Two) Times a Day., Disp: , Rfl: 0  •  Diclofenac Sodium 1.5 % solution, APPLY TOPICALLY AS DIRECTED DAILY AS NEEDED FOR 30 DAYS, Disp: , Rfl: 0  •  fenofibrate (TRICOR)  145 MG tablet, Take 1 tablet by mouth Daily. (Patient taking differently: Take 145 mg by mouth Daily. Also taking 135mg tablet in AM), Disp: 90 tablet, Rfl: 3  •  glyBURIDE (DIAbeta) 5 MG tablet, Take 10 mg by mouth 2 (Two) Times a Day., Disp: , Rfl: 3  •  hydroCHLOROthiazide (MICROZIDE) 12.5 MG capsule, TAKE 1 CAPSULE BY MOUTH ONCE DAILY, Disp: 30 capsule, Rfl: 2  •  ipratropium-albuterol (DUO-NEB) 0.5-2.5 mg/3 ml nebulizer, Take 3 mL by nebulization Every 4 (Four) Hours As Needed for Wheezing., Disp: 360 mL, Rfl: 3  •  isosorbide mononitrate (IMDUR) 30 MG 24 hr tablet, Take 1 tablet by mouth Daily., Disp: 90 tablet, Rfl: 3  •  JARDIANCE 25 MG tablet, TAKE 1 TABLET BY MOUTH ONCE DAILY, Disp: 30 tablet, Rfl: 2  •  lidocaine (XYLOCAINE) 5 % ointment, USE OINTMENT EXTERNALLY THREE TIMES A DAY FOR 30 DAYS AS NEEDED, Disp: , Rfl: 0  •  losartan (COZAAR) 100 MG tablet, Take 1 tablet by mouth Daily., Disp: 90 tablet, Rfl: 3  •  LYRICA 100 MG capsule, Take 100 mg by mouth 2 (Two) Times a Day As Needed (patient states only takes as needed)., Disp: , Rfl: 0  •  metFORMIN ER (GLUCOPHAGE-XR) 500 MG 24 hr tablet, Take 1,000 mg by mouth 2 (Two) Times a Day., Disp: , Rfl: 6  •  montelukast (SINGULAIR) 10 MG tablet, Take 10 mg by mouth Daily., Disp: , Rfl: 5  •  oxyCODONE (ROXICODONE) 10 MG tablet, Take 10 mg by mouth Every 6 (Six) Hours As Needed (patient states only takes as needed)., Disp: , Rfl: 0  •  pantoprazole (PROTONIX) 40 MG EC tablet, Take 40 mg by mouth Daily. FOR 30 DAYS, Disp: , Rfl: 6  •  pioglitazone (ACTOS) 15 MG tablet, TAKE 1 TABLET BY MOUTH ONCE DAILY, Disp: 30 tablet, Rfl: 2  •  Semaglutide,0.25 or 0.5MG/DOS, (OZEMPIC, 0.25 OR 0.5 MG/DOSE,) 2 MG/1.5ML solution pen-injector, 0.25 mg subcu once a week for 4 weeks then increase to 0.5 mg subcu weekly., Disp: 1.5 mL, Rfl: 3  •  sulindac (CLINORIL) 200 MG tablet, Take 1 tablet by mouth 2 (Two) Times a Day., Disp: 60 tablet, Rfl: 1    Current Facility-Administered  Medications:   •  ipratropium-albuterol (DUO-NEB) nebulizer solution 3 mL, 3 mL, Nebulization, 4x Daily - RT, Kavitha Teran MD      Assessment/Plan   1.  Diabetes mellitus type 2: Uncontrolled with A1c of 8.5%.  At this time I will increase Ozempic to 1.0 mg once a week injection.  Effects of nausea, abdominal pain and vomiting discussed with him.  He is advised to call if he develops any abdominal pain or vomiting however nausea will get better with time.  2.  Hypertension: Well-controlled, continue current medication  3.  Hyperlipidemia: His LDL is 73 with triglycerides 251. Continue 40 mg p.o. daily.  Will follow lipid panel.  4.  Obesity: Advised to continue to work on diet and activity.            Miki Man MD FACE.

## 2020-03-16 RX ORDER — HYDROCHLOROTHIAZIDE 12.5 MG/1
CAPSULE, GELATIN COATED ORAL
Qty: 90 CAPSULE | Refills: 2 | Status: SHIPPED | OUTPATIENT
Start: 2020-03-16 | End: 2021-01-07

## 2020-03-16 RX ORDER — DILTIAZEM HYDROCHLORIDE 240 MG/1
CAPSULE, EXTENDED RELEASE ORAL
Qty: 90 CAPSULE | Refills: 0 | Status: SHIPPED | OUTPATIENT
Start: 2020-03-16 | End: 2020-04-22

## 2020-03-31 RX ORDER — GLYBURIDE 5 MG/1
10 TABLET ORAL 2 TIMES DAILY
Qty: 120 TABLET | Refills: 3 | Status: SHIPPED | OUTPATIENT
Start: 2020-03-31 | End: 2021-06-02

## 2020-04-21 ENCOUNTER — TELEPHONE (OUTPATIENT)
Dept: FAMILY MEDICINE CLINIC | Facility: CLINIC | Age: 60
End: 2020-04-21

## 2020-04-21 NOTE — TELEPHONE ENCOUNTER
Pt called and stated he is having migraines for the last 7 or 8 days and took his BP today and had readings of 151/86 144/89  147/89. Pt would like to know what he should do     Please advise     [phone]

## 2020-04-22 ENCOUNTER — OFFICE VISIT (OUTPATIENT)
Dept: FAMILY MEDICINE CLINIC | Facility: CLINIC | Age: 60
End: 2020-04-22

## 2020-04-22 VITALS
SYSTOLIC BLOOD PRESSURE: 151 MMHG | WEIGHT: 206 LBS | DIASTOLIC BLOOD PRESSURE: 89 MMHG | BODY MASS INDEX: 33.25 KG/M2 | HEART RATE: 96 BPM

## 2020-04-22 DIAGNOSIS — R00.1 BRADYCARDIA, SINUS: ICD-10-CM

## 2020-04-22 DIAGNOSIS — Z12.11 SCREENING FOR COLON CANCER: ICD-10-CM

## 2020-04-22 DIAGNOSIS — I25.119 CORONARY ARTERY DISEASE WITH ANGINA PECTORIS, UNSPECIFIED VESSEL OR LESION TYPE, UNSPECIFIED WHETHER NATIVE OR TRANSPLANTED HEART (HCC): ICD-10-CM

## 2020-04-22 DIAGNOSIS — I10 ESSENTIAL HYPERTENSION: Primary | ICD-10-CM

## 2020-04-22 DIAGNOSIS — E78.2 MIXED HYPERLIPIDEMIA: ICD-10-CM

## 2020-04-22 PROCEDURE — 99213 OFFICE O/P EST LOW 20 MIN: CPT | Performed by: PREVENTIVE MEDICINE

## 2020-04-22 RX ORDER — DILTIAZEM HYDROCHLORIDE 300 MG/1
300 CAPSULE, COATED, EXTENDED RELEASE ORAL DAILY
Qty: 90 CAPSULE | Refills: 3 | Status: SHIPPED | OUTPATIENT
Start: 2020-04-22 | End: 2020-09-02

## 2020-04-22 RX ORDER — ISOSORBIDE MONONITRATE 30 MG/1
30 TABLET, EXTENDED RELEASE ORAL DAILY
Qty: 90 TABLET | Refills: 3 | Status: SHIPPED | OUTPATIENT
Start: 2020-04-22 | End: 2020-09-02

## 2020-04-22 NOTE — PATIENT INSTRUCTIONS
Increase cartia and after 3 days Check blood pressure cuff for accuracy and send 10 blood pressures over 2 weeks.  Watch sodium, alcohol and weight    Have ordered cologuard and advise he returns

## 2020-04-22 NOTE — TELEPHONE ENCOUNTER
He needs video/phone visit.  Not seen since 10/4/2019.  Make sure he takes complete vitals including weight pulse and BP with temp

## 2020-04-22 NOTE — PROGRESS NOTES
Subjective   Dawson Lomax is a 59 y.o. male presents for No chief complaint on file.      Health Maintenance Due   Topic Date Due   • TDAP/TD VACCINES (1 - Tdap) 12/20/1971   • ZOSTER VACCINE (1 of 2) 12/20/2010   • COLONOSCOPY  09/11/2017   • DIABETIC FOOT EXAM  12/20/2019   • DIABETIC EYE EXAM  03/01/2020       You have chosen to receive care through a telephone visit. Do you consent to use a telephone visit for your medical care today? Yes  Spent 22 minutes on phone with patient.  He has relatives staying with him which is stressful and last week nightly headache starts from back of head and radiates over ears.  Started checking Bp and was 149/86 and 144/87.  Blood sugars doing well. No chest pain or chest pressure.  Is almost out of Imdur and BS meds.  2 tylenol willmake headaches better.         Vitals:    04/22/20 1046   BP: 151/89   Pulse: 96   Weight: 93.4 kg (206 lb)     Body mass index is 33.25 kg/m².    Current Outpatient Medications on File Prior to Visit   Medication Sig Dispense Refill   • aspirin 81 MG EC tablet Take 81 mg by mouth Daily.     • atorvastatin (LIPITOR) 20 MG tablet TAKE 1 TABLET BY MOUTH ONCE DAILY 30 tablet 5   • cyclobenzaprine (FLEXERIL) 10 MG tablet Take 10 mg by mouth 2 (Two) Times a Day.  0   • Diclofenac Sodium 1.5 % solution APPLY TOPICALLY AS DIRECTED DAILY AS NEEDED FOR 30 DAYS  0   • fenofibrate (TRICOR) 145 MG tablet Take 1 tablet by mouth Daily. (Patient taking differently: Take 145 mg by mouth Daily. Also taking 135mg tablet in AM) 90 tablet 3   • glyburide (DIAbeta) 5 MG tablet Take 2 tablets by mouth 2 (Two) Times a Day. 120 tablet 3   • hydroCHLOROthiazide (MICROZIDE) 12.5 MG capsule Take 1 capsule by mouth once daily 90 capsule 2   • ipratropium-albuterol (DUO-NEB) 0.5-2.5 mg/3 ml nebulizer Take 3 mL by nebulization Every 4 (Four) Hours As Needed for Wheezing. 360 mL 3   • isosorbide mononitrate (IMDUR) 30 MG 24 hr tablet Take 1 tablet by mouth Daily. 90 tablet 3    • lidocaine (XYLOCAINE) 5 % ointment USE OINTMENT EXTERNALLY THREE TIMES A DAY FOR 30 DAYS AS NEEDED  0   • losartan (COZAAR) 100 MG tablet Take 1 tablet by mouth Daily. 90 tablet 3   • LYRICA 100 MG capsule Take 100 mg by mouth 2 (Two) Times a Day As Needed (patient states only takes as needed).  0   • metFORMIN ER (GLUCOPHAGE-XR) 500 MG 24 hr tablet Take 1,000 mg by mouth 2 (Two) Times a Day.  6   • montelukast (SINGULAIR) 10 MG tablet Take 10 mg by mouth Daily.  5   • oxyCODONE (ROXICODONE) 10 MG tablet Take 10 mg by mouth Every 6 (Six) Hours As Needed (patient states only takes as needed).  0   • pantoprazole (PROTONIX) 40 MG EC tablet Take 40 mg by mouth Daily. FOR 30 DAYS  6   • pioglitazone (ACTOS) 15 MG tablet TAKE 1 TABLET BY MOUTH ONCE DAILY 30 tablet 2   • Semaglutide, 1 MG/DOSE, (OZEMPIC, 1 MG/DOSE,) 2 MG/1.5ML solution pen-injector Inject 1 mg under the skin into the appropriate area as directed 1 (One) Time Per Week. 3 mL 6   • sulindac (CLINORIL) 200 MG tablet Take 1 tablet by mouth 2 (Two) Times a Day. 60 tablet 1   • [DISCONTINUED] CARTIA  MG 24 hr capsule Take 1 capsule by mouth once daily 90 capsule 0   • JARDIANCE 25 MG tablet TAKE 1 TABLET BY MOUTH ONCE DAILY 30 tablet 2     Current Facility-Administered Medications on File Prior to Visit   Medication Dose Route Frequency Provider Last Rate Last Dose   • ipratropium-albuterol (DUO-NEB) nebulizer solution 3 mL  3 mL Nebulization 4x Daily - RT Kavitha Teran MD           The following portions of the patient's history were reviewed and updated as appropriate: allergies, current medications, past family history, past medical history, past social history, past surgical history and problem list.    Review of Systems   Constitutional: Negative.    HENT: Positive for hearing loss. Negative for sinus pressure and sore throat.    Eyes: Negative.    Respiratory: Negative.  Negative for cough.    Cardiovascular: Negative.     Gastrointestinal: Negative.    Endocrine: Negative.    Genitourinary: Negative.    Musculoskeletal: Positive for neck pain.   Skin: Negative.    Allergic/Immunologic: Positive for environmental allergies.   Neurological: Positive for headache.   Hematological: Negative.    Psychiatric/Behavioral: Negative.        Objective   Physical Exam   Constitutional: He is oriented to person, place, and time. No distress.   Pulmonary/Chest: Effort normal.   Neurological: He is alert and oriented to person, place, and time.   Skin: He is not diaphoretic.   Psychiatric: He has a normal mood and affect.   Vitals reviewed.    PHQ-9 Total Score:      Assessment/Plan   Diagnoses and all orders for this visit:    Essential hypertension  Comments:  Blood pressure has been elevated with headache.  Taking meds.   Will increase   cartia to 300 and have him send 10 blood pressures and pulse in 2 weeks.    Coronary artery disease with angina pectoris, unspecified vessel or lesion type, unspecified whether native or transplanted heart (CMS/[facility])  Comments:  No chest pain or shortness of air    Mixed hyperlipidemia  Comments:  Dieting has been tough    Bradycardia, sinus  Comments:  Not checking rate    Screening for colon cancer        Patient Instructions   Increase cartia and after 3 days Check blood pressure cuff for accuracy and send 10 blood pressures over 2 weeks.  Watch sodium, alcohol and weight    Have ordered cologuard and advise he returns

## 2020-05-11 ENCOUNTER — APPOINTMENT (OUTPATIENT)
Dept: LAB | Facility: HOSPITAL | Age: 60
End: 2020-05-11

## 2020-05-11 ENCOUNTER — OFFICE VISIT (OUTPATIENT)
Dept: ONCOLOGY | Facility: CLINIC | Age: 60
End: 2020-05-11

## 2020-05-11 DIAGNOSIS — R91.8 PULMONARY NODULES: ICD-10-CM

## 2020-05-11 DIAGNOSIS — D72.828 OTHER ELEVATED WHITE BLOOD CELL (WBC) COUNT: Primary | ICD-10-CM

## 2020-05-11 PROCEDURE — 99213 OFFICE O/P EST LOW 20 MIN: CPT | Performed by: INTERNAL MEDICINE

## 2020-05-11 NOTE — PROGRESS NOTES
Hematology/Oncology Outpatient Follow Up    PATIENT NAME:Dawson Lomax  :1960  MRN: [id number]  PRIMARY CARE PHYSICIAN: Kavitha Teran MD  REFERRING PHYSICIAN: Kavitha Teran MD    Chief Complaint   Patient presents with   • Follow-up     pulmonary nodule, leucocytosis     You have chosen to receive care through a telephone visit. Do you consent to use a telephone visit for your medical care today? Yes     HISTORY OF PRESENT ILLNESS:   This is a 59-year-old male who is here due to two to three month history of night sweats.  Patient denies any fevers or chills.  He has chest congestion for which he had a chest x-ray which was essentially clear.  He denies any other symptoms such as weight loss or fatigue symptoms.  He still has a cough which is productive of clear sputum.  He has no urinary symptoms.  He denies nausea, vomiting or diarrhea.  During his work up for the above complaint he had a CBC done on 3/27/15 which showed a white count of 14.7, hemoglobin 14.4, platelet count of 421,000.  His differentials were 48% neutrophils, 37% lymphocytes, 12% monocytes.  He has no basophilia or eosinophilia noted on his differential count.  He had mild monocytosis and also mild absolute lymphocytosis.  He is alone today for this appointment.    • 2015 - CT scan of the abdomen and pelvis which showed moderate stool and post splenectomy.  Otherwise negative.    • 2015 - Chest x-ray, essentially normal with no acute abnormalities identified.    • 4/1/15 - Patient had further testing including BCR-abl, which was negative for mutation.  His flow cytometry was negative for lymphoproliferative disease.  JORY-2 was negative.  Acute viral hepatitis panel was negative.  He had a normal B12 level, sed rate and LDH.  Uric acid was also normal and his urinalysis was negative for any infection.  His repeat CBC on the same day showed WBC count of 12.9, hemoglobin 13.8, platelet count 414,000.    • 6/10/15 - CT scan of the chest without contrast.  This did not reveal any pulmonary mass.  He has increased upper abdominal lymph nodes, but within normal limits for size.    • 2/2/17 - Patient was admitted to the hospital for acute coronary syndrome.  Patient apparently had a myocardiac infarction.  He had one stent placed during hospitalization.  I saw him in the past for leukocytosis with negative work up.    • 2/28/17 - WBC 15.5, hemoglobin 13.7, platelet count 425,000.  Differentials were 47% neutrophils, 37% lymphocytes.  There was mild monocytosis at 12% [range 2-11].    • Labs since last visit of 3/1/17 - BCR-abl negative.  B12 (N) 541.  LDH (N) 178.  Uric acid (L) 3.8.    • 3/9/17 - Peripheral blood flow cytometry was negative.  JORY-2 was negative for mutation.    • 3/13/17 - Serum PSA 0.60.   • 2/28/18 - CT scan of the chest showed fatty infiltration of the liver.  There was a small 2 to 3 mm pleural based nodule posteriorly and laterally in the right lower lobe, stable from prior CT scan of 2017.   • 10/19/19-CT of the chest showed small subpleural 3 mm right lower lobe nodule.  Stable 4 mm subpleural right lower lobe nodule.  • No new  Issues since last seen. He has no recent hospitalizations    Past Medical History:   Diagnosis Date   • B12 deficiency 2/2/2015   • Broken finger     broken middle finger   • Coronary artery disease with angina pectoris (CMS/HCC) 7/2/2019   • Gout    • Heart attack (CMS/HCC) 2017   • Hx of migraines    • Hyperlipidemia    • Hypertension    • Plantar fasciitis, left 1/9/2017       Past Surgical History:   Procedure Laterality Date   • ANKLE SURGERY Left 2001   • CARDIAC CATHETERIZATION  2017   • CARPAL TUNNEL RELEASE Bilateral    • CATARACT EXTRACTION  2015   • HERNIA REPAIR     • OTHER SURGICAL HISTORY  2017    MI with Stent 2010, 2017    • SPLENECTOMY           Current Outpatient Medications:   •  aspirin 81 MG EC tablet, Take 81 mg by mouth Daily., Disp: , Rfl:   •   atorvastatin (LIPITOR) 20 MG tablet, TAKE 1 TABLET BY MOUTH ONCE DAILY, Disp: 30 tablet, Rfl: 5  •  cyclobenzaprine (FLEXERIL) 10 MG tablet, Take 10 mg by mouth 2 (Two) Times a Day., Disp: , Rfl: 0  •  Diclofenac Sodium 1.5 % solution, APPLY TOPICALLY AS DIRECTED DAILY AS NEEDED FOR 30 DAYS, Disp: , Rfl: 0  •  dilTIAZem CD (Cartia XT) 300 MG 24 hr capsule, Take 1 capsule by mouth Daily., Disp: 90 capsule, Rfl: 3  •  Empagliflozin (Jardiance) 25 MG tablet, Take 25 mg by mouth Daily., Disp: 30 tablet, Rfl: 2  •  fenofibrate (TRICOR) 145 MG tablet, Take 1 tablet by mouth Daily. (Patient taking differently: Take 145 mg by mouth Daily. Also taking 135mg tablet in AM), Disp: 90 tablet, Rfl: 3  •  glyburide (DIAbeta) 5 MG tablet, Take 2 tablets by mouth 2 (Two) Times a Day., Disp: 120 tablet, Rfl: 3  •  hydroCHLOROthiazide (MICROZIDE) 12.5 MG capsule, Take 1 capsule by mouth once daily, Disp: 90 capsule, Rfl: 2  •  ipratropium-albuterol (DUO-NEB) 0.5-2.5 mg/3 ml nebulizer, Take 3 mL by nebulization Every 4 (Four) Hours As Needed for Wheezing., Disp: 360 mL, Rfl: 3  •  isosorbide mononitrate (IMDUR) 30 MG 24 hr tablet, Take 1 tablet by mouth Daily., Disp: 90 tablet, Rfl: 3  •  lidocaine (XYLOCAINE) 5 % ointment, USE OINTMENT EXTERNALLY THREE TIMES A DAY FOR 30 DAYS AS NEEDED, Disp: , Rfl: 0  •  losartan (COZAAR) 100 MG tablet, Take 1 tablet by mouth Daily., Disp: 90 tablet, Rfl: 3  •  LYRICA 100 MG capsule, Take 100 mg by mouth 2 (Two) Times a Day As Needed (patient states only takes as needed)., Disp: , Rfl: 0  •  metFORMIN ER (GLUCOPHAGE-XR) 500 MG 24 hr tablet, Take 1,000 mg by mouth 2 (Two) Times a Day., Disp: , Rfl: 6  •  oxyCODONE (ROXICODONE) 10 MG tablet, Take 10 mg by mouth Every 6 (Six) Hours As Needed (patient states only takes as needed)., Disp: , Rfl: 0  •  pantoprazole (PROTONIX) 40 MG EC tablet, Take 40 mg by mouth Daily. FOR 30 DAYS, Disp: , Rfl: 6  •  pioglitazone (ACTOS) 15 MG tablet, TAKE 1 TABLET BY  MOUTH ONCE DAILY, Disp: 30 tablet, Rfl: 2  •  Semaglutide, 1 MG/DOSE, (OZEMPIC, 1 MG/DOSE,) 2 MG/1.5ML solution pen-injector, Inject 1 mg under the skin into the appropriate area as directed 1 (One) Time Per Week., Disp: 3 mL, Rfl: 6  •  sulindac (CLINORIL) 200 MG tablet, Take 1 tablet by mouth 2 (Two) Times a Day., Disp: 60 tablet, Rfl: 1  •  montelukast (SINGULAIR) 10 MG tablet, Take 10 mg by mouth Daily., Disp: , Rfl: 5    Current Facility-Administered Medications:   •  ipratropium-albuterol (DUO-NEB) nebulizer solution 3 mL, 3 mL, Nebulization, 4x Daily - RT, Kavitha Teran MD    Allergies   Allergen Reactions   • Gabapentin Dizziness       Family History   Problem Relation Age of Onset   • Diabetes Paternal Grandmother    • Cancer Other        Cancer-related family history includes Cancer in an other family member.    Social History     Tobacco Use   • Smoking status: Former Smoker   • Smokeless tobacco: Never Used   Substance Use Topics   • Alcohol use: No     Frequency: Never   • Drug use: No       I have reviewed and confirmed the accuracy of the patient's history: as entered by the MA/LPN/RN. Sita Yusuf MD 05/11/20     SUBJECTIVE:  The patient is here for a follow up appointment.  And is doing well without any significant issues.  He denies any hospitalizations.  He remains active.  He continues to work.        REVIEW OF SYSTEMS:  Review of Systems   Constitutional: Negative for chills and fever.   HENT: Negative for ear pain, mouth sores, nosebleeds and sore throat.    Eyes: Negative for photophobia and visual disturbance.   Respiratory: Negative for wheezing and stridor.    Cardiovascular: Negative for chest pain and palpitations.   Gastrointestinal: Negative for abdominal pain, diarrhea, nausea and vomiting.   Endocrine: Negative for cold intolerance and heat intolerance.   Genitourinary: Negative for dysuria and hematuria.   Musculoskeletal: Negative for joint swelling and neck  stiffness.   Skin: Negative for color change and rash.   Neurological: Negative for seizures and syncope.   Hematological: Negative for adenopathy.        No obvious bleeding   Psychiatric/Behavioral: Negative for agitation, confusion and hallucinations.     Unchanged    OBJECTIVE:    There were no vitals filed for this visit.    ECOG  (0) Fully active, able to carry on all predisease performance without restriction    Physical Exam   Constitutional: He is oriented to person, place, and time.   Neurological: He is alert and oriented to person, place, and time.   Psychiatric: He has a normal mood and affect. His behavior is normal. Judgment and thought content normal.       RECENT LABS  WBC   Date Value Ref Range Status   11/11/2019 13.77 (H) 3.40 - 10.80 10*3/mm3 Final     RBC   Date Value Ref Range Status   11/11/2019 5.15 4.14 - 5.80 10*6/mm3 Final     Hemoglobin   Date Value Ref Range Status   11/11/2019 16.1 13.0 - 17.7 g/dL Final     Hematocrit   Date Value Ref Range Status   11/11/2019 48.5 37.5 - 51.0 % Final     MCV   Date Value Ref Range Status   11/11/2019 94.2 79.0 - 97.0 fL Final     MCH   Date Value Ref Range Status   11/11/2019 31.3 26.6 - 33.0 pg Final     MCHC   Date Value Ref Range Status   11/11/2019 33.2 31.5 - 35.7 g/dL Final     RDW   Date Value Ref Range Status   11/11/2019 13.8 12.3 - 15.4 % Final     RDW-SD   Date Value Ref Range Status   11/11/2019 46.4 37.0 - 54.0 fl Final     MPV   Date Value Ref Range Status   11/11/2019 9.2 6.0 - 12.0 fL Final     Platelets   Date Value Ref Range Status   11/11/2019 436 140 - 450 10*3/mm3 Final     Neutrophil %   Date Value Ref Range Status   11/11/2019 56.1 42.7 - 76.0 % Final     Lymphocyte %   Date Value Ref Range Status   11/11/2019 31.4 19.6 - 45.3 % Final     Monocyte %   Date Value Ref Range Status   11/11/2019 11.0 5.0 - 12.0 % Final     Eosinophil %   Date Value Ref Range Status   11/11/2019 1.0 0.3 - 6.2 % Final     Basophil %   Date Value Ref  Range Status   11/11/2019 0.5 0.0 - 1.5 % Final     Neutrophils, Absolute   Date Value Ref Range Status   11/11/2019 7.72 (H) 1.70 - 7.00 10*3/mm3 Final     Lymphocytes, Absolute   Date Value Ref Range Status   11/11/2019 4.33 (H) 0.70 - 3.10 10*3/mm3 Final     Monocytes, Absolute   Date Value Ref Range Status   11/11/2019 1.51 (H) 0.10 - 0.90 10*3/mm3 Final     Eosinophils, Absolute   Date Value Ref Range Status   11/11/2019 0.14 0.00 - 0.40 10*3/mm3 Final     Basophils, Absolute   Date Value Ref Range Status   11/11/2019 0.07 0.00 - 0.20 10*3/mm3 Final     nRBC   Date Value Ref Range Status   10/08/2019 0.1 0.0 - 0.2 /100 WBC Final       Lab Results   Component Value Date    GLUCOSE 136 (H) 02/21/2020    BUN 13 02/21/2020    CREATININE 0.91 02/21/2020    EGFRIFNONA 85 02/21/2020    BCR 14.3 02/21/2020    K 3.9 02/21/2020    CO2 22.8 02/21/2020    CALCIUM 9.4 02/21/2020    ALBUMIN 4.40 02/21/2020    LABIL2 1.1 02/08/2019    AST 18 02/21/2020    ALT 22 02/21/2020         Assessment/Plan     There are no diagnoses linked to this encounter.    ASSESSMENT:    1. Leukocytosis likely reactive and also post splenectomy with negative peripheral work up so far.      2. History of right pulmonary nodules. Stable on recent CT chest done 10/19/19    PLANS:     Patient has persistent leukocytosis secondary to post splenectomy state, stable.  For his history of pulmonary nodules, I have recommended a CT scan of the chest without contrast to further evaluate to be done in July 2020.  I will otherwise see him back in six months, but will watch for the results of the CT scan once done.      He will continue to follow up with Dr. Teran, his primary care physician.        I have reviewed labs results, imaging, vitals, and medications with the patient today. Will follow up in 6 months with me .      Patient verbalized understanding and is in agreement of the above plan.    I spent more than 15 minutes discussing with patient  management recommendations, reviewing imaging studies, lab results, progress notes and formulating management decisions.  Time was also spent answering all his/ her questions to the best of my abilities.

## 2020-05-19 RX ORDER — ATORVASTATIN CALCIUM 20 MG/1
20 TABLET, FILM COATED ORAL DAILY
Qty: 30 TABLET | Refills: 5 | Status: SHIPPED | OUTPATIENT
Start: 2020-05-19 | End: 2021-03-15

## 2020-05-19 RX ORDER — PIOGLITAZONEHYDROCHLORIDE 15 MG/1
TABLET ORAL
Qty: 30 TABLET | Refills: 5 | Status: SHIPPED | OUTPATIENT
Start: 2020-05-19 | End: 2020-09-02

## 2020-06-19 ENCOUNTER — TELEPHONE (OUTPATIENT)
Dept: FAMILY MEDICINE CLINIC | Facility: CLINIC | Age: 60
End: 2020-06-19

## 2020-06-19 NOTE — TELEPHONE ENCOUNTER
Spoke with patient to make appt. He states he is suppose to have neck surgery done on Monday. I told him I was surprised he did not have to have a release from us to get that done. He is going to see if he is able to do the surgery Monday and than will call back to get the appt for everything. Also told him he needs to get with his eye doctor and get his diabetic eye exam done as soon as possible

## 2020-06-19 NOTE — TELEPHONE ENCOUNTER
Please call patient and advise insurance company wants labs, blood pressure check and eye exam done soon and would like to have you take Zetia along with lipid meds if still high bad cholesterol.  Please call to set up lab, BP and eye exams

## 2020-06-22 RX ORDER — METFORMIN HYDROCHLORIDE 500 MG/1
TABLET, EXTENDED RELEASE ORAL
Qty: 120 TABLET | Refills: 3 | Status: SHIPPED | OUTPATIENT
Start: 2020-06-22 | End: 2020-09-02

## 2020-06-26 ENCOUNTER — HOSPITAL ENCOUNTER (EMERGENCY)
Facility: HOSPITAL | Age: 60
Discharge: HOME | End: 2020-06-26
Attending: EMERGENCY MEDICINE | Admitting: EMERGENCY MEDICINE

## 2020-06-26 VITALS
BODY MASS INDEX: 35.43 KG/M2 | DIASTOLIC BLOOD PRESSURE: 66 MMHG | RESPIRATION RATE: 18 BRPM | TEMPERATURE: 98.2 F | WEIGHT: 220.46 LBS | OXYGEN SATURATION: 94 % | HEART RATE: 98 BPM | SYSTOLIC BLOOD PRESSURE: 143 MMHG | HEIGHT: 66 IN

## 2020-06-26 DIAGNOSIS — L02.01 ABSCESS OF FOREHEAD: ICD-10-CM

## 2020-06-26 DIAGNOSIS — S00.81XA ABRASION OF FOREHEAD, INITIAL ENCOUNTER: Primary | ICD-10-CM

## 2020-06-26 PROCEDURE — 99283 EMERGENCY DEPT VISIT LOW MDM: CPT

## 2020-06-26 RX ORDER — HYDROCODONE BITARTRATE AND ACETAMINOPHEN 10; 325 MG/1; MG/1
1 TABLET ORAL EVERY 6 HOURS PRN
Qty: 10 TABLET | Refills: 0 | Status: SHIPPED | OUTPATIENT
Start: 2020-06-26 | End: 2020-07-09

## 2020-06-26 RX ORDER — CEPHALEXIN 500 MG/1
500 CAPSULE ORAL 3 TIMES DAILY
Qty: 15 CAPSULE | Refills: 0 | Status: SHIPPED | OUTPATIENT
Start: 2020-06-26 | End: 2020-07-01

## 2020-06-27 NOTE — DISCHARGE INSTRUCTIONS
Wash the area 3 times a day with soap and water.  Keep the area clean and covered with gauze until healed.  Keflex 3 times a day for infection and hydrocodone for pain.

## 2020-06-27 NOTE — ED PROVIDER NOTES
Subjective   History of Present Illness  Patient states that 2 days ago he was bending over to pick something up from a table and hit his head on the mantle.  He did not have any loss of consciousness but had a small abrasion which gradually has increased over the past 2 days to a redness and swelling and it is painful.  The patient has no spontaneous drainage.  Review of Systems    Past Medical History:   Diagnosis Date   • B12 deficiency 2/2/2015   • Broken finger     broken middle finger   • Coronary artery disease with angina pectoris (CMS/[facility]) 7/2/2019   • Gout    • Heart attack (CMS/[facility]) 2017   • Hx of migraines    • Hyperlipidemia    • Hypertension    • Plantar fasciitis, left 1/9/2017       Allergies   Allergen Reactions   • Gabapentin Dizziness       Past Surgical History:   Procedure Laterality Date   • ANKLE SURGERY Left 2001   • CARDIAC CATHETERIZATION  2017   • CARPAL TUNNEL RELEASE Bilateral    • CATARACT EXTRACTION  2015   • HERNIA REPAIR     • OTHER SURGICAL HISTORY  2017    MI with Stent 2010, 2017    • SPLENECTOMY         Family History   Problem Relation Age of Onset   • Diabetes Paternal Grandmother    • Cancer Other        Social History     Socioeconomic History   • Marital status:      Spouse name: Not on file   • Number of children: Not on file   • Years of education: Not on file   • Highest education level: Not on file   Tobacco Use   • Smoking status: Former Smoker   • Smokeless tobacco: Never Used   Substance and Sexual Activity   • Alcohol use: No     Frequency: Never   • Drug use: No   • Sexual activity: Defer           Objective   Physical Exam  The patient is awake and alert he was afebrile vital signs are stable he has a small 3 mm diameter abrasion to the mid forehead and this is surrounded by an area of induration warmth tenderness and erythema.  There is no spontaneous drainage.  No other injury was noted  Procedures     The area was infiltrated with 2 cc of bupivacaine  0.25%.  The area was then cleansed with saline and a small incision was made with 11 blade and a small amount of pus was drained from the wound it was irrigated and cleansed with normal saline and gauze dressing was applied.      ED Course                                           MDM  Patient is instructed to keep this area clean and covered with dry gauze until healed.  He is to wash it 3 times a day with soap and water.  The patient will be started on Keflex.  He was given 2 days of hydrocodone to use for pain.  Final diagnoses:   Abrasion of forehead, initial encounter   Abscess of forehead            Joe Lorenzo MD  06/26/20 [id number]

## 2020-07-02 ENCOUNTER — TELEPHONE (OUTPATIENT)
Dept: FAMILY MEDICINE CLINIC | Facility: CLINIC | Age: 60
End: 2020-07-02

## 2020-07-02 NOTE — TELEPHONE ENCOUNTER
Please call patient and advise have received another note from insurance that he is to set up appointment for fasting labs and OV

## 2020-07-06 ENCOUNTER — LAB (OUTPATIENT)
Dept: LAB | Facility: HOSPITAL | Age: 60
End: 2020-07-06

## 2020-07-06 DIAGNOSIS — E11.65 TYPE 2 DIABETES MELLITUS WITH HYPERGLYCEMIA, WITHOUT LONG-TERM CURRENT USE OF INSULIN (HCC): ICD-10-CM

## 2020-07-06 DIAGNOSIS — E78.2 MIXED HYPERLIPIDEMIA: ICD-10-CM

## 2020-07-06 DIAGNOSIS — E78.5 HYPERLIPIDEMIA, UNSPECIFIED HYPERLIPIDEMIA TYPE: Chronic | ICD-10-CM

## 2020-07-06 DIAGNOSIS — E11.65 TYPE 2 DIABETES MELLITUS WITH HYPERGLYCEMIA, UNSPECIFIED WHETHER LONG TERM INSULIN USE (HCC): ICD-10-CM

## 2020-07-06 DIAGNOSIS — I10 ESSENTIAL HYPERTENSION: ICD-10-CM

## 2020-07-06 DIAGNOSIS — I10 HYPERTENSION, UNSPECIFIED TYPE: ICD-10-CM

## 2020-07-06 DIAGNOSIS — E66.09 CLASS 2 OBESITY DUE TO EXCESS CALORIES WITHOUT SERIOUS COMORBIDITY WITH BODY MASS INDEX (BMI) OF 35.0 TO 35.9 IN ADULT: ICD-10-CM

## 2020-07-06 LAB
ALBUMIN SERPL-MCNC: 4.1 G/DL (ref 3.5–5.2)
ALBUMIN/GLOB SERPL: 1.3 G/DL
ALP SERPL-CCNC: 66 U/L (ref 39–117)
ALT SERPL W P-5'-P-CCNC: 24 U/L (ref 1–41)
ANION GAP SERPL CALCULATED.3IONS-SCNC: 11.4 MMOL/L (ref 5–15)
ARTICHOKE IGE QN: 69 MG/DL (ref 0–100)
AST SERPL-CCNC: 20 U/L (ref 1–40)
BILIRUB SERPL-MCNC: 0.3 MG/DL (ref 0–1.2)
BUN SERPL-MCNC: 12 MG/DL (ref 6–20)
BUN/CREAT SERPL: 13.3 (ref 7–25)
CALCIUM SPEC-SCNC: 9.8 MG/DL (ref 8.6–10.5)
CHLORIDE SERPL-SCNC: 104 MMOL/L (ref 98–107)
CHOLEST SERPL-MCNC: 169 MG/DL (ref 0–200)
CO2 SERPL-SCNC: 23.6 MMOL/L (ref 22–29)
CREAT SERPL-MCNC: 0.9 MG/DL (ref 0.76–1.27)
GFR SERPL CREATININE-BSD FRML MDRD: 86 ML/MIN/1.73
GLOBULIN UR ELPH-MCNC: 3.2 GM/DL
GLUCOSE SERPL-MCNC: 161 MG/DL (ref 65–99)
HBA1C MFR BLD: 7.4 % (ref 3.5–5.6)
HDLC SERPL-MCNC: 25 MG/DL (ref 40–60)
LDLC SERPL CALC-MCNC: ABNORMAL MG/DL
LDLC/HDLC SERPL: ABNORMAL {RATIO}
POTASSIUM SERPL-SCNC: 3.7 MMOL/L (ref 3.5–5.2)
PROT SERPL-MCNC: 7.3 G/DL (ref 6–8.5)
SODIUM SERPL-SCNC: 139 MMOL/L (ref 136–145)
TRIGL SERPL-MCNC: 637 MG/DL (ref 0–150)
VLDLC SERPL-MCNC: ABNORMAL MG/DL

## 2020-07-06 PROCEDURE — 36415 COLL VENOUS BLD VENIPUNCTURE: CPT

## 2020-07-06 PROCEDURE — 80053 COMPREHEN METABOLIC PANEL: CPT

## 2020-07-06 PROCEDURE — 80061 LIPID PANEL: CPT

## 2020-07-06 PROCEDURE — 83036 HEMOGLOBIN GLYCOSYLATED A1C: CPT

## 2020-07-06 PROCEDURE — 83721 ASSAY OF BLOOD LIPOPROTEIN: CPT

## 2020-07-07 ENCOUNTER — OFFICE VISIT (OUTPATIENT)
Dept: ENDOCRINOLOGY | Facility: CLINIC | Age: 60
End: 2020-07-07

## 2020-07-07 ENCOUNTER — LAB (OUTPATIENT)
Dept: LAB | Facility: HOSPITAL | Age: 60
End: 2020-07-07

## 2020-07-07 VITALS
HEIGHT: 66 IN | TEMPERATURE: 98.4 F | SYSTOLIC BLOOD PRESSURE: 132 MMHG | DIASTOLIC BLOOD PRESSURE: 75 MMHG | OXYGEN SATURATION: 98 % | BODY MASS INDEX: 35.52 KG/M2 | HEART RATE: 70 BPM | WEIGHT: 221 LBS

## 2020-07-07 DIAGNOSIS — R73.9 HYPERGLYCEMIA: ICD-10-CM

## 2020-07-07 DIAGNOSIS — E11.65 TYPE 2 DIABETES MELLITUS WITH HYPERGLYCEMIA, WITHOUT LONG-TERM CURRENT USE OF INSULIN (HCC): ICD-10-CM

## 2020-07-07 DIAGNOSIS — I10 ESSENTIAL HYPERTENSION: ICD-10-CM

## 2020-07-07 DIAGNOSIS — E11.65 TYPE 2 DIABETES MELLITUS WITH HYPERGLYCEMIA, WITHOUT LONG-TERM CURRENT USE OF INSULIN (HCC): Primary | ICD-10-CM

## 2020-07-07 DIAGNOSIS — E78.2 MIXED HYPERLIPIDEMIA: ICD-10-CM

## 2020-07-07 DIAGNOSIS — N52.9 IMPOTENCE: ICD-10-CM

## 2020-07-07 LAB
FSH SERPL-ACNC: 15.3 MIU/ML
GLUCOSE BLDC GLUCOMTR-MCNC: 167 MG/DL (ref 70–130)
LH SERPL-ACNC: 5.22 MIU/ML
PROLACTIN SERPL-MCNC: 10.9 NG/ML (ref 4.04–15.2)

## 2020-07-07 PROCEDURE — 84402 ASSAY OF FREE TESTOSTERONE: CPT

## 2020-07-07 PROCEDURE — 83001 ASSAY OF GONADOTROPIN (FSH): CPT

## 2020-07-07 PROCEDURE — 84146 ASSAY OF PROLACTIN: CPT

## 2020-07-07 PROCEDURE — 83002 ASSAY OF GONADOTROPIN (LH): CPT

## 2020-07-07 PROCEDURE — 82962 GLUCOSE BLOOD TEST: CPT | Performed by: INTERNAL MEDICINE

## 2020-07-07 PROCEDURE — 99214 OFFICE O/P EST MOD 30 MIN: CPT | Performed by: INTERNAL MEDICINE

## 2020-07-07 PROCEDURE — 84270 ASSAY OF SEX HORMONE GLOBUL: CPT

## 2020-07-07 PROCEDURE — 84403 ASSAY OF TOTAL TESTOSTERONE: CPT

## 2020-07-07 RX ORDER — CEPHALEXIN 500 MG/1
CAPSULE ORAL
COMMUNITY
Start: 2020-07-01 | End: 2020-07-09

## 2020-07-07 RX ORDER — FENOFIBRATE 160 MG/1
TABLET ORAL
Qty: 30 TABLET | Refills: 6 | Status: SHIPPED | OUTPATIENT
Start: 2020-07-07 | End: 2021-05-06

## 2020-07-07 NOTE — PROGRESS NOTES
Endocrine Progress Note Outpatient     Patient Care Team:  Kavitha Teran MD as PCP - General  Kavitha Teran MD as PCP - Family Medicine    Chief Complaint: Follow-up type 2 diabetes    HPI: 59-year-old male with history of type 2 diabetes, hypertension, hyperlipidemia and obesity is here for follow-up.    For type 2 diabetes he is currently on metformin 1000 g twice a day, Jardiance 25 mill grams once a day, glyburide 10 mill grams twice a day, Ozempic 1.0 mg subcu once a week and pioglitazone 15 mg once a day.  He is trying to work on his diet and activity.  His blood sugars have improved but is still running high.  Hypertension: Well-controlled  Hyperlipidemia: He is currently on atorvastatin and fenofibrate.  Obesity: He is trying to work on diet.   Impotence: New problem, , I would check testosterone level.    Past Medical History:   Diagnosis Date   • B12 deficiency 2/2/2015   • Broken finger     broken middle finger   • Coronary artery disease with angina pectoris (CMS/[facility]) 7/2/2019   • Gout    • Heart attack (CMS/[facility]) 2017   • Hx of migraines    • Hyperlipidemia    • Hypertension    • Plantar fasciitis, left 1/9/2017       Social History     Socioeconomic History   • Marital status:      Spouse name: Not on file   • Number of children: Not on file   • Years of education: Not on file   • Highest education level: Not on file   Tobacco Use   • Smoking status: Former Smoker   • Smokeless tobacco: Never Used   Substance and Sexual Activity   • Alcohol use: No     Frequency: Never   • Drug use: No   • Sexual activity: Defer       Family History   Problem Relation Age of Onset   • Diabetes Paternal Grandmother    • Cancer Other        Allergies   Allergen Reactions   • Gabapentin Dizziness       ROS:   Constitutional:  Denies fatigue, tiredness.    Eyes:  Denies change in visual acuity   HENT:  Denies nasal congestion or sore throat   Respiratory: denies cough, shortness of breath.    Cardiovascular:  denies chest pain, edema   GI:  Denies abdominal pain, nausea, vomiting.   Musculoskeletal:  Denies back pain or joint pain   Integument:  Denies dry skin and rash   Neurologic:  Denies headache, focal weakness or sensory changes   Endocrine:  Denies polyuria or polydipsia   Psychiatric:  Denies depression or anxiety      Vitals:    07/07/20 0855   BP: 132/75   Pulse: 70   Temp: 98.4 °F (36.9 °C)   SpO2: 98%       Physical Exam:  GEN: NAD, conversant, obese  EYES: EOMI, PERRL, no conjunctival erythema  NECK: no thyromegaly, full ROM   CV: RRR, no murmurs/rubs/gallops, no peripheral edema  LUNG: CTAB, no wheezes/rales/ronchi  SKIN: no rashes, no acanthosis  MSK: no deformities, full ROM of all extremities  NEURO: no tremors, DTR normal  PSYCH: AOX3, appropriate mood, affect normal      Results Review:     I reviewed the patient's new clinical results.    Lab Results   Component Value Date    HGBA1C 7.4 (H) 07/06/2020    HGBA1C 8.5 (H) 02/21/2020    HGBA1C 9.6 (H) 10/08/2019      Lab Results   Component Value Date    GLUCOSE 161 (H) 07/06/2020    BUN 12 07/06/2020    CREATININE 0.90 07/06/2020    EGFRIFNONA 86 07/06/2020    BCR 13.3 07/06/2020    K 3.7 07/06/2020    CO2 23.6 07/06/2020    CALCIUM 9.8 07/06/2020    ALBUMIN 4.10 07/06/2020    LABIL2 1.1 02/08/2019    AST 20 07/06/2020    ALT 24 07/06/2020    CHOL 169 07/06/2020    TRIG 637 (H) 07/06/2020    LDL  07/06/2020      Comment:      Unable to calculate    LDL 69 07/06/2020    HDL 25 (L) 07/06/2020     Lab Results   Component Value Date    TSH 1.770 10/08/2019         Medication Review: Reviewed.       Current Outpatient Medications:   •  aspirin 81 MG EC tablet, Take 81 mg by mouth Daily., Disp: , Rfl:   •  atorvastatin (LIPITOR) 20 MG tablet, Take 1 tablet by mouth Daily., Disp: 30 tablet, Rfl: 5  •  cephalexin (KEFLEX) 500 MG capsule, , Disp: , Rfl:   •  cyclobenzaprine (FLEXERIL) 10 MG tablet, Take 10 mg by mouth 2 (Two) Times a Day.,  Disp: , Rfl: 0  •  Diclofenac Sodium 1.5 % solution, APPLY TOPICALLY AS DIRECTED DAILY AS NEEDED FOR 30 DAYS, Disp: , Rfl: 0  •  dilTIAZem CD (Cartia XT) 300 MG 24 hr capsule, Take 1 capsule by mouth Daily., Disp: 90 capsule, Rfl: 3  •  Empagliflozin (Jardiance) 25 MG tablet, Take 25 mg by mouth Daily., Disp: 30 tablet, Rfl: 2  •  fenofibrate (TRICOR) 145 MG tablet, Take 1 tablet by mouth Daily. (Patient taking differently: Take 145 mg by mouth Daily. Also taking 135mg tablet in AM), Disp: 90 tablet, Rfl: 3  •  glyburide (DIAbeta) 5 MG tablet, Take 2 tablets by mouth 2 (Two) Times a Day., Disp: 120 tablet, Rfl: 3  •  hydroCHLOROthiazide (MICROZIDE) 12.5 MG capsule, Take 1 capsule by mouth once daily, Disp: 90 capsule, Rfl: 2  •  HYDROcodone-acetaminophen (NORCO)  MG per tablet, Take 1 tablet by mouth Every 6 (Six) Hours As Needed for Moderate Pain  for up to 10 doses., Disp: 10 tablet, Rfl: 0  •  ipratropium-albuterol (DUO-NEB) 0.5-2.5 mg/3 ml nebulizer, Take 3 mL by nebulization Every 4 (Four) Hours As Needed for Wheezing., Disp: 360 mL, Rfl: 3  •  isosorbide mononitrate (IMDUR) 30 MG 24 hr tablet, Take 1 tablet by mouth Daily., Disp: 90 tablet, Rfl: 3  •  lidocaine (XYLOCAINE) 5 % ointment, USE OINTMENT EXTERNALLY THREE TIMES A DAY FOR 30 DAYS AS NEEDED, Disp: , Rfl: 0  •  losartan (COZAAR) 100 MG tablet, Take 1 tablet by mouth Daily., Disp: 90 tablet, Rfl: 3  •  LYRICA 100 MG capsule, Take 100 mg by mouth 2 (Two) Times a Day As Needed (patient states only takes as needed)., Disp: , Rfl: 0  •  metFORMIN ER (GLUCOPHAGE-XR) 500 MG 24 hr tablet, Take 2 tablets by mouth twice daily, Disp: 120 tablet, Rfl: 3  •  montelukast (SINGULAIR) 10 MG tablet, Take 10 mg by mouth Daily., Disp: , Rfl: 5  •  oxyCODONE (ROXICODONE) 10 MG tablet, Take 10 mg by mouth Every 6 (Six) Hours As Needed (patient states only takes as needed)., Disp: , Rfl: 0  •  pantoprazole (PROTONIX) 40 MG EC tablet, Take 40 mg by mouth Daily. FOR 30  DAYS, Disp: , Rfl: 6  •  pioglitazone (ACTOS) 15 MG tablet, Take 1 tablet by mouth once daily, Disp: 30 tablet, Rfl: 5  •  Semaglutide, 1 MG/DOSE, (OZEMPIC, 1 MG/DOSE,) 2 MG/1.5ML solution pen-injector, Inject 1 mg under the skin into the appropriate area as directed 1 (One) Time Per Week., Disp: 3 mL, Rfl: 6  •  sulindac (CLINORIL) 200 MG tablet, Take 1 tablet by mouth 2 (Two) Times a Day., Disp: 60 tablet, Rfl: 1    Current Facility-Administered Medications:   •  ipratropium-albuterol (DUO-NEB) nebulizer solution 3 mL, 3 mL, Nebulization, 4x Daily - RT, Kavitha Teran MD      Assessment/Plan   1.  Diabetes mellitus type 2: Uncontrolled with A1c of 7.4%.    2.  Hypertension: Well-controlled, continue current medication  3.  Hyperlipidemia: Uncontrolled with high triglycerides of 637.  LDL was 69.  He has been out of fenofibrate, we will resume fenofibrate.  Continue atorvastatin.  4.  Obesity: Advised to continue to work on diet and activity.  5.  Impotence: We will check total and free testosterone with LH, FSH and SHBG and prolactin.            Miki Man MD FACE.

## 2020-07-07 NOTE — PATIENT INSTRUCTIONS
Please resume fenofibrate at 160 mg p.o. daily  Please check total and free testosterone with LH, FSH, prolactin and SHBG in the next few days fasting and make sure the labs are drawn before 10 AM  Continue rest of the medication  Always keep glucose source in case of low blood sugar  Always get regular eye exam  Follow-up in 4 months.

## 2020-07-08 LAB
SHBG SERPL-SCNC: 28.1 NMOL/L (ref 19.3–76.4)
TESTOST FREE SERPL-MCNC: NORMAL PG/ML
TESTOST SERPL-MCNC: 268 NG/DL (ref 264–916)

## 2020-07-09 ENCOUNTER — OFFICE VISIT (OUTPATIENT)
Dept: FAMILY MEDICINE CLINIC | Facility: CLINIC | Age: 60
End: 2020-07-09

## 2020-07-09 ENCOUNTER — HOSPITAL ENCOUNTER (OUTPATIENT)
Dept: PET IMAGING | Facility: HOSPITAL | Age: 60
End: 2020-07-09

## 2020-07-09 VITALS
DIASTOLIC BLOOD PRESSURE: 79 MMHG | SYSTOLIC BLOOD PRESSURE: 146 MMHG | TEMPERATURE: 98.7 F | BODY MASS INDEX: 35.58 KG/M2 | HEART RATE: 65 BPM | HEIGHT: 66 IN | OXYGEN SATURATION: 95 % | WEIGHT: 221.4 LBS | RESPIRATION RATE: 16 BRPM

## 2020-07-09 DIAGNOSIS — H61.21 IMPACTED CERUMEN OF RIGHT EAR: ICD-10-CM

## 2020-07-09 DIAGNOSIS — R04.0 EPISTAXIS, RECURRENT: Primary | ICD-10-CM

## 2020-07-09 DIAGNOSIS — J30.1 SEASONAL ALLERGIC RHINITIS DUE TO POLLEN: ICD-10-CM

## 2020-07-09 LAB
BASOPHILS # BLD AUTO: 0.09 10*3/MM3 (ref 0–0.2)
BASOPHILS NFR BLD AUTO: 0.7 % (ref 0–1.5)
DEPRECATED RDW RBC AUTO: 45.5 FL (ref 37–54)
EOSINOPHIL # BLD AUTO: 0 10*3/MM3 (ref 0–0.4)
EOSINOPHIL NFR BLD AUTO: 0 % (ref 0.3–6.2)
ERYTHROCYTE [DISTWIDTH] IN BLOOD BY AUTOMATED COUNT: 13.3 % (ref 12.3–15.4)
HCT VFR BLD AUTO: 46.3 % (ref 37.5–51)
HGB BLD-MCNC: 16 G/DL (ref 13–17.7)
IMM GRANULOCYTES # BLD AUTO: 0.08 10*3/MM3 (ref 0–0.05)
IMM GRANULOCYTES NFR BLD AUTO: 0.6 % (ref 0–0.5)
LYMPHOCYTES # BLD AUTO: 5.05 10*3/MM3 (ref 0.7–3.1)
LYMPHOCYTES NFR BLD AUTO: 37.5 % (ref 19.6–45.3)
MCH RBC QN AUTO: 32.2 PG (ref 26.6–33)
MCHC RBC AUTO-ENTMCNC: 34.6 G/DL (ref 31.5–35.7)
MCV RBC AUTO: 93.2 FL (ref 79–97)
MONOCYTES # BLD AUTO: 1.52 10*3/MM3 (ref 0.1–0.9)
MONOCYTES NFR BLD AUTO: 11.3 % (ref 5–12)
NEUTROPHILS NFR BLD AUTO: 49.9 % (ref 42.7–76)
NEUTROPHILS NFR BLD AUTO: 6.74 10*3/MM3 (ref 1.7–7)
NRBC BLD AUTO-RTO: 0 /100 WBC (ref 0–0.2)
PLATELET # BLD AUTO: 433 10*3/MM3 (ref 140–450)
PMV BLD AUTO: 9.7 FL (ref 6–12)
RBC # BLD AUTO: 4.97 10*6/MM3 (ref 4.14–5.8)
WBC # BLD AUTO: 13.48 10*3/MM3 (ref 3.4–10.8)

## 2020-07-09 PROCEDURE — 69210 REMOVE IMPACTED EAR WAX UNI: CPT | Performed by: NURSE PRACTITIONER

## 2020-07-09 PROCEDURE — 99213 OFFICE O/P EST LOW 20 MIN: CPT | Performed by: NURSE PRACTITIONER

## 2020-07-09 PROCEDURE — 85025 COMPLETE CBC W/AUTO DIFF WBC: CPT | Performed by: NURSE PRACTITIONER

## 2020-07-09 NOTE — PATIENT INSTRUCTIONS
Nasocort (triamcinolone) nasal spray 2 sprays each nostril daily  Try not to blow your nose    How to Perform a Sinus Rinse  A sinus rinse is a home treatment. It rinses your sinuses with a mixture of salt and water (saline solution). Sinuses are air-filled spaces in your skull behind the bones of your face and forehead. They open into your nasal cavity.  A sinus rinse can help to clear your nasal cavity. It can clear mucus, dirt, dust, or pollen.  You may do a sinus rinse when you have:  · A cold.  · A virus.  · Allergies.  · A sinus infection.  · A stuffy nose.  Talk with your doctor about whether a sinus rinse might help you.  What are the risks?  A sinus rinse is normally very safe and helpful. However, there are a few risks. These include:  · A burning feeling in the sinuses. This may happen if you do not make the saline solution as instructed. Be sure to follow all directions when making the saline solution.  · Nasal irritation.  · Infection from unclean water. This is rare, but possible.  Do not do a sinus rinse if you have had:  · Ear or nasal surgery.  · An ear infection.  · Blocked ears.  Supplies needed:  · Saline solution or powder.  · Distilled or germ-free (sterile) water may be needed to mix with saline powder.  ? You may use boiled and cooled tap water. Boil tap water for 5 minutes; cool until it is lukewarm. Use within 24 hours.  ? Do not use regular tap water to mix with the saline solution.  · Neti pot or nasal rinse bottle. This releases the saline solution into your nose and through your sinuses. You can buy neti pots and rinse bottles:  ? At your local pharmacy.  ? At a [employer] food store.  ? Online.  How to perform a sinus rinse    1. Wash your hands with soap and water.  2. Wash your device using the directions that came with it.  3. Dry your device.  4. Use the solution that comes with your device or one that is sold separately in stores. Follow the mixing directions on the package if you  need to mix with sterile or distilled water.  5. Fill your device with the amount of saline solution stated in the device instructions.  6. Stand over a sink and tilt your head sideways over the sink.  7. Place the spout of the device in your upper nostril (the one closer to the ceiling).  8. Gently pour or squeeze the saline solution into your nasal cavity. The liquid should drain to your lower nostril if you are not too stuffed up (congested).  9. While rinsing, breathe through your open mouth.  10. Gently blow your nose to clear any mucus and rinse solution. Blowing too hard may cause ear pain.  11. Repeat in your other nostril.  12. Clean and rinse your device with clean water.  13. Air-dry your device.  Talk with your doctor or pharmacist if you have questions about how to do a sinus rinse.  Summary  · A sinus rinse is a home treatment. It rinses your sinuses with a mixture of salt and water (saline solution).  · A sinus rinse is normally very safe and helpful. Follow all instructions carefully.  · Talk with your doctor about whether a sinus rinse might help you.  This information is not intended to replace advice given to you by your health care provider. Make sure you discuss any questions you have with your health care provider.  Document Released: 07/15/2015 Document Revised: 10/15/2018 Document Reviewed: 10/15/2018  Elsevier Patient Education © 2020 [employer]

## 2020-07-09 NOTE — PROGRESS NOTES
"Subjective   Dawson Lomax is a 59 y.o. male presents for   Chief Complaint   Patient presents with   • Nose Bleed     x 2 weeks        Health Maintenance Due   Topic Date Due   • TDAP/TD VACCINES (1 - Tdap) 12/20/1971   • ZOSTER VACCINE (1 of 2) 12/20/2010   • COLONOSCOPY  09/11/2017   • DIABETIC FOOT EXAM  12/20/2019   • DIABETIC EYE EXAM  03/01/2020       History of Present Illness   Pt reports nose bleeds x 2-3 weeks.  He states he had a covid-19 test performed for pre-op clearance approximately 2 weeks ago.  He reports some days he will experience several noses bleeds a day, often for unknown reasons.  He reports it was bleeding during the night last night and in the back of his throat this am.  He states he has put a cold cloth on his forehead and neck to make them stop.      Vitals:    07/09/20 0855 07/09/20 0900   BP: 126/73 146/79   BP Location: Right arm Left arm   Patient Position: Sitting Sitting   Cuff Size: Adult Adult   Pulse: 70 65   Resp: 16    Temp: 98.7 °F (37.1 °C)    TempSrc: Infrared    SpO2: 95%    Weight: 100 kg (221 lb 6.4 oz)    Height: 167.6 cm (66\")      Body mass index is 35.73 kg/m².    Current Outpatient Medications on File Prior to Visit   Medication Sig Dispense Refill   • aspirin 81 MG EC tablet Take 81 mg by mouth Daily.     • atorvastatin (LIPITOR) 20 MG tablet Take 1 tablet by mouth Daily. 30 tablet 5   • cyclobenzaprine (FLEXERIL) 10 MG tablet Take 10 mg by mouth 2 (Two) Times a Day As Needed.  0   • Diclofenac Sodium 1.5 % solution APPLY TOPICALLY AS DIRECTED DAILY AS NEEDED FOR 30 DAYS  0   • dilTIAZem CD (Cartia XT) 300 MG 24 hr capsule Take 1 capsule by mouth Daily. 90 capsule 3   • Empagliflozin (Jardiance) 25 MG tablet Take 25 mg by mouth Daily. 30 tablet 2   • fenofibrate 160 MG tablet 1 tablet p.o. daily 30 tablet 6   • glyburide (DIAbeta) 5 MG tablet Take 2 tablets by mouth 2 (Two) Times a Day. 120 tablet 3   • hydroCHLOROthiazide (MICROZIDE) 12.5 MG capsule Take 1 " capsule by mouth once daily 90 capsule 2   • ipratropium-albuterol (DUO-NEB) 0.5-2.5 mg/3 ml nebulizer Take 3 mL by nebulization Every 4 (Four) Hours As Needed for Wheezing. 360 mL 3   • isosorbide mononitrate (IMDUR) 30 MG 24 hr tablet Take 1 tablet by mouth Daily. 90 tablet 3   • lidocaine (XYLOCAINE) 5 % ointment USE OINTMENT EXTERNALLY THREE TIMES A DAY FOR 30 DAYS AS NEEDED  0   • losartan (COZAAR) 100 MG tablet Take 1 tablet by mouth Daily. 90 tablet 3   • LYRICA 100 MG capsule Take 100 mg by mouth 2 (Two) Times a Day As Needed (patient states only takes as needed).  0   • metFORMIN ER (GLUCOPHAGE-XR) 500 MG 24 hr tablet Take 2 tablets by mouth twice daily 120 tablet 3   • montelukast (SINGULAIR) 10 MG tablet Take 10 mg by mouth Daily.  5   • oxyCODONE (ROXICODONE) 10 MG tablet Take 10 mg by mouth Every 6 (Six) Hours As Needed (patient states only takes as needed).  0   • pioglitazone (ACTOS) 15 MG tablet Take 1 tablet by mouth once daily 30 tablet 5   • Semaglutide, 1 MG/DOSE, (OZEMPIC, 1 MG/DOSE,) 2 MG/1.5ML solution pen-injector Inject 1 mg under the skin into the appropriate area as directed 1 (One) Time Per Week. 3 mL 6   • pantoprazole (PROTONIX) 40 MG EC tablet Take 40 mg by mouth Daily. FOR 30 DAYS  6   • sulindac (CLINORIL) 200 MG tablet Take 1 tablet by mouth 2 (Two) Times a Day. 60 tablet 1   • [DISCONTINUED] cephalexin (KEFLEX) 500 MG capsule      • [DISCONTINUED] HYDROcodone-acetaminophen (NORCO)  MG per tablet Take 1 tablet by mouth Every 6 (Six) Hours As Needed for Moderate Pain  for up to 10 doses. 10 tablet 0     Current Facility-Administered Medications on File Prior to Visit   Medication Dose Route Frequency Provider Last Rate Last Dose   • ipratropium-albuterol (DUO-NEB) nebulizer solution 3 mL  3 mL Nebulization 4x Daily - RT Kavitha Teran MD           The following portions of the patient's history were reviewed and updated as appropriate: allergies, current medications,  past family history, past medical history, past social history, past surgical history and problem list.    Review of Systems   Constitutional: Negative for chills and fever.   HENT: Positive for nosebleeds. Negative for sinus pressure and sore throat.    Eyes: Negative for blurred vision.   Respiratory: Negative for cough and shortness of breath.    Cardiovascular: Negative for chest pain.   Gastrointestinal: Negative for abdominal pain.   Endocrine: Negative.    Genitourinary: Negative.    Musculoskeletal: Negative for arthralgias and joint swelling.   Skin: Negative for color change.   Allergic/Immunologic: Negative.    Neurological: Negative for dizziness.   Hematological: Negative.    Psychiatric/Behavioral: Negative for behavioral problems.       Objective   Physical Exam   Constitutional: He is oriented to person, place, and time. He appears well-developed and well-nourished.   HENT:   Head: Normocephalic and atraumatic.   Right Ear: External ear normal. cerumen impaction is present.  Left Ear: External ear normal.   Nose: Mucosal edema and septal deviation present. Epistaxis is observed. Right sinus exhibits no maxillary sinus tenderness and no frontal sinus tenderness. Left sinus exhibits no maxillary sinus tenderness and no frontal sinus tenderness.   Mouth/Throat: Oropharynx is clear and moist.   Eyes: Pupils are equal, round, and reactive to light. EOM are normal.   Neck: Normal range of motion. Neck supple.   Cardiovascular: Normal rate, regular rhythm, normal heart sounds and intact distal pulses.   Pulmonary/Chest: Effort normal and breath sounds normal.   Abdominal: Soft. Bowel sounds are normal.   Musculoskeletal: Normal range of motion.   Neurological: He is alert and oriented to person, place, and time.   Skin: Skin is warm and dry.   Psychiatric: He has a normal mood and affect. His behavior is normal.   Nursing note and vitals reviewed.    PHQ-9 Total Score:      Assessment/Plan   Dawson was seen  today for nose bleed.    Diagnoses and all orders for this visit:    Epistaxis, recurrent  Comments:  instructed to use nasal/sinus rinses twice daily, avoid blowing nose and use nasocort as directed. call if no improvment or worsening.  Orders:  -     CBC Auto Differential    Seasonal allergic rhinitis due to pollen    Impacted cerumen of right ear        Patient Instructions   Nasocort (triamcinolone) nasal spray 2 sprays each nostril daily  Try not to blow your nose    How to Perform a Sinus Rinse  A sinus rinse is a home treatment. It rinses your sinuses with a mixture of salt and water (saline solution). Sinuses are air-filled spaces in your skull behind the bones of your face and forehead. They open into your nasal cavity.  A sinus rinse can help to clear your nasal cavity. It can clear mucus, dirt, dust, or pollen.  You may do a sinus rinse when you have:  · A cold.  · A virus.  · Allergies.  · A sinus infection.  · A stuffy nose.  Talk with your doctor about whether a sinus rinse might help you.  What are the risks?  A sinus rinse is normally very safe and helpful. However, there are a few risks. These include:  · A burning feeling in the sinuses. This may happen if you do not make the saline solution as instructed. Be sure to follow all directions when making the saline solution.  · Nasal irritation.  · Infection from unclean water. This is rare, but possible.  Do not do a sinus rinse if you have had:  · Ear or nasal surgery.  · An ear infection.  · Blocked ears.  Supplies needed:  · Saline solution or powder.  · Distilled or germ-free (sterile) water may be needed to mix with saline powder.  ? You may use boiled and cooled tap water. Boil tap water for 5 minutes; cool until it is lukewarm. Use within 24 hours.  ? Do not use regular tap water to mix with the saline solution.  · Neti pot or nasal rinse bottle. This releases the saline solution into your nose and through your sinuses. You can buy neti pots  and rinse bottles:  ? At your local pharmacy.  ? At a [employer] store.  ? Online.  How to perform a sinus rinse    1. Wash your hands with soap and water.  2. Wash your device using the directions that came with it.  3. Dry your device.  4. Use the solution that comes with your device or one that is sold separately in stores. Follow the mixing directions on the package if you need to mix with sterile or distilled water.  5. Fill your device with the amount of saline solution stated in the device instructions.  6. Stand over a sink and tilt your head sideways over the sink.  7. Place the spout of the device in your upper nostril (the one closer to the ceiling).  8. Gently pour or squeeze the saline solution into your nasal cavity. The liquid should drain to your lower nostril if you are not too stuffed up (congested).  9. While rinsing, breathe through your open mouth.  10. Gently blow your nose to clear any mucus and rinse solution. Blowing too hard may cause ear pain.  11. Repeat in your other nostril.  12. Clean and rinse your device with clean water.  13. Air-dry your device.  Talk with your doctor or pharmacist if you have questions about how to do a sinus rinse.  Summary  · A sinus rinse is a home treatment. It rinses your sinuses with a mixture of salt and water (saline solution).  · A sinus rinse is normally very safe and helpful. Follow all instructions carefully.  · Talk with your doctor about whether a sinus rinse might help you.  This information is not intended to replace advice given to you by your health care provider. Make sure you discuss any questions you have with your health care provider.  Document Released: 07/15/2015 Document Revised: 10/15/2018 Document Reviewed: 10/15/2018  Elsevier Patient Education © 2020 [employer]

## 2020-07-10 ENCOUNTER — APPOINTMENT (OUTPATIENT)
Dept: CT IMAGING | Facility: HOSPITAL | Age: 60
End: 2020-07-10

## 2020-07-10 DIAGNOSIS — N52.9 IMPOTENCE: Primary | ICD-10-CM

## 2020-07-10 DIAGNOSIS — E11.65 TYPE 2 DIABETES MELLITUS WITH HYPERGLYCEMIA, WITHOUT LONG-TERM CURRENT USE OF INSULIN (HCC): ICD-10-CM

## 2020-07-10 DIAGNOSIS — I10 ESSENTIAL HYPERTENSION: ICD-10-CM

## 2020-07-10 DIAGNOSIS — E78.2 MIXED HYPERLIPIDEMIA: ICD-10-CM

## 2020-07-13 ENCOUNTER — LAB (OUTPATIENT)
Dept: LAB | Facility: HOSPITAL | Age: 60
End: 2020-07-13

## 2020-07-13 DIAGNOSIS — E78.2 MIXED HYPERLIPIDEMIA: ICD-10-CM

## 2020-07-13 DIAGNOSIS — I10 ESSENTIAL HYPERTENSION: ICD-10-CM

## 2020-07-13 DIAGNOSIS — N52.9 IMPOTENCE: ICD-10-CM

## 2020-07-13 DIAGNOSIS — E11.65 TYPE 2 DIABETES MELLITUS WITH HYPERGLYCEMIA, WITHOUT LONG-TERM CURRENT USE OF INSULIN (HCC): ICD-10-CM

## 2020-07-13 LAB
FSH SERPL-ACNC: 16.7 MIU/ML
LH SERPL-ACNC: 6.37 MIU/ML

## 2020-07-13 PROCEDURE — 83001 ASSAY OF GONADOTROPIN (FSH): CPT

## 2020-07-13 PROCEDURE — 36415 COLL VENOUS BLD VENIPUNCTURE: CPT

## 2020-07-13 PROCEDURE — 84403 ASSAY OF TOTAL TESTOSTERONE: CPT

## 2020-07-13 PROCEDURE — 84402 ASSAY OF FREE TESTOSTERONE: CPT

## 2020-07-13 PROCEDURE — 83002 ASSAY OF GONADOTROPIN (LH): CPT

## 2020-07-15 LAB
TESTOST FREE SERPL-MCNC: 8.7 PG/ML (ref 7.2–24)
TESTOST SERPL-MCNC: 358 NG/DL (ref 264–916)

## 2020-07-17 ENCOUNTER — HOSPITAL ENCOUNTER (OUTPATIENT)
Dept: CT IMAGING | Facility: HOSPITAL | Age: 60
Discharge: HOME | End: 2020-07-17
Admitting: INTERNAL MEDICINE

## 2020-07-17 DIAGNOSIS — R91.8 PULMONARY NODULES: ICD-10-CM

## 2020-07-17 PROCEDURE — 71250 CT THORAX DX C-: CPT

## 2020-07-18 PROBLEM — J18.9 PNEUMONIA OF RIGHT LOWER LOBE DUE TO INFECTIOUS ORGANISM: Status: RESOLVED | Noted: 2019-07-22 | Resolved: 2020-07-18

## 2020-07-18 NOTE — PATIENT INSTRUCTIONS
Advise Td, Shingrix, pneumonia 23, HIB at pharmacy.    Advise eye exam, dental  and colonoscopy    Calorie Counting for Weight Loss  Calories are units of energy. Your body needs a certain amount of calories from food to keep you going throughout the day. When you eat more calories than your body needs, your body stores the extra calories as fat. When you eat fewer calories than your body needs, your body burns fat to get the energy it needs.  Calorie counting means keeping track of how many calories you eat and drink each day. Calorie counting can be helpful if you need to lose weight. If you make sure to eat fewer calories than your body needs, you should lose weight. Ask your health care provider what a healthy weight is for you.  For calorie counting to work, you will need to eat the right number of calories in a day in order to lose a healthy amount of weight per week. A dietitian can help you determine how many calories you need in a day and will give you suggestions on how to reach your calorie goal.  · A healthy amount of weight to lose per week is usually 1-2 lb (0.5-0.9 kg). This usually means that your daily calorie intake should be reduced by 500-750 calories.  · Eating 1,200 - 1,500 calories per day can help most women lose weight.  · Eating 1,500 - 1,800 calories per day can help most men lose weight.  What is my plan?  My goal is to have __________ calories per day.  If I have this many calories per day, I should lose around __________ pounds per week.  What do I need to know about calorie counting?  In order to meet your daily calorie goal, you will need to:  · Find out how many calories are in each food you would like to eat. Try to do this before you eat.  · Decide how much of the food you plan to eat.  · Write down what you ate and how many calories it had. Doing this is called keeping a food log.  To successfully lose weight, it is important to balance calorie counting with a healthy lifestyle  that includes regular activity. Aim for 150 minutes of moderate exercise (such as walking) or 75 minutes of vigorous exercise (such as running) each week.  Where do I find calorie information?      The number of calories in a food can be found on a Nutrition Facts label. If a food does not have a Nutrition Facts label, try to look up the calories online or ask your dietitian for help.  Remember that calories are listed per serving. If you choose to have more than one serving of a food, you will have to multiply the calories per serving by the amount of servings you plan to eat. For example, the label on a package of bread might say that a serving size is 1 slice and that there are 90 calories in a serving. If you eat 1 slice, you will have eaten 90 calories. If you eat 2 slices, you will have eaten 180 calories.  How do I keep a food log?  Immediately after each meal, record the following information in your food log:  · What you ate. Don't forget to include toppings, sauces, and other extras on the food.  · How much you ate. This can be measured in cups, ounces, or number of items.  · How many calories each food and drink had.  · The total number of calories in the meal.  Keep your food log near you, such as in a small notebook in your pocket, or use a mobile vidya or website. Some programs will calculate calories for you and show you how many calories you have left for the day to meet your goal.  What are some calorie counting tips?      · Use your calories on foods and drinks that will fill you up and not leave you hungry:  ? Some examples of foods that fill you up are nuts and nut butters, vegetables, lean proteins, and high-fiber foods like whole grains. High-fiber foods are foods with more than 5 g fiber per serving.  ? Drinks such as sodas, specialty coffee drinks, alcohol, and juices have a lot of calories, yet do not fill you up.  · Eat nutritious foods and avoid empty calories. Empty calories are calories  "you get from foods or beverages that do not have many vitamins or protein, such as candy, sweets, and soda. It is better to have a nutritious high-calorie food (such as an avocado) than a food with few nutrients (such as a bag of chips).  · Know how many calories are in the foods you eat most often. This will help you calculate calorie counts faster.  · Pay attention to calories in drinks. Low-calorie drinks include water and unsweetened drinks.  · Pay attention to nutrition labels for \"low fat\" or \"fat free\" foods. These foods sometimes have the same amount of calories or more calories than the full fat versions. They also often have added sugar, starch, or salt, to make up for flavor that was removed with the fat.  · Find a way of tracking calories that works for you. Get creative. Try different apps or programs if writing down calories does not work for you.  What are some portion control tips?  · Know how many calories are in a serving. This will help you know how many servings of a certain food you can have.  · Use a measuring cup to measure serving sizes. You could also try weighing out portions on a kitchen scale. With time, you will be able to estimate serving sizes for some foods.  · Take some time to put servings of different foods on your favorite plates, bowls, and cups so you know what a serving looks like.  · Try not to eat straight from a bag or box. Doing this can lead to overeating. Put the amount you would like to eat in a cup or on a plate to make sure you are eating the right portion.  · Use smaller plates, glasses, and bowls to prevent overeating.  · Try not to multitask (for example, watch TV or use your computer) while eating. If it is time to eat, sit down at a table and enjoy your food. This will help you to know when you are full. It will also help you to be aware of what you are eating and how much you are eating.  What are tips for following this plan?  Reading food labels  · Check the " "calorie count compared to the serving size. The serving size may be smaller than what you are used to eating.  · Check the source of the calories. Make sure the food you are eating is high in vitamins and protein and low in saturated and trans fats.  Shopping  · Read nutrition labels while you shop. This will help you make healthy decisions before you decide to purchase your food.  · Make a grocery list and stick to it.  Cooking  · Try to cook your favorite foods in a healthier way. For example, try baking instead of frying.  · Use low-fat dairy products.  Meal planning  · Use more fruits and vegetables. Half of your plate should be fruits and vegetables.  · Include lean proteins like poultry and fish.  How do I count calories when eating out?  · Ask for smaller portion sizes.  · Consider sharing an entree and sides instead of getting your own entree.  · If you get your own entree, eat only half. Ask for a box at the beginning of your meal and put the rest of your entree in it so you are not tempted to eat it.  · If calories are listed on the menu, choose the lower calorie options.  · Choose dishes that include vegetables, fruits, whole grains, low-fat dairy products, and lean protein.  · Choose items that are boiled, broiled, grilled, or steamed. Stay away from items that are buttered, battered, fried, or served with cream sauce. Items labeled \"crispy\" are usually fried, unless stated otherwise.  · Choose water, low-fat milk, unsweetened iced tea, or other drinks without added sugar. If you want an alcoholic beverage, choose a lower calorie option such as a glass of wine or light beer.  · Ask for dressings, sauces, and syrups on the side. These are usually high in calories, so you should limit the amount you eat.  · If you want a salad, choose a garden salad and ask for grilled meats. Avoid extra toppings like cline, cheese, or fried items. Ask for the dressing on the side, or ask for olive oil and vinegar or lemon " to use as dressing.  · Estimate how many servings of a food you are given. For example, a serving of cooked rice is ½ cup or about the size of half a baseball. Knowing serving sizes will help you be aware of how much food you are eating at restaurants. The list below tells you how big or small some common portion sizes are based on everyday objects:  ? 1 oz--4 stacked dice.  ? 3 oz--1 deck of cards.  ? 1 tsp--1 die.  ? 1 Tbsp--½ a ping-pong ball.  ? 2 Tbsp--1 ping-pong ball.  ? ½ cup--½ baseball.  ? 1 cup--1 baseball.  Summary  · Calorie counting means keeping track of how many calories you eat and drink each day. If you eat fewer calories than your body needs, you should lose weight.  · A healthy amount of weight to lose per week is usually 1-2 lb (0.5-0.9 kg). This usually means reducing your daily calorie intake by 500-750 calories.  · The number of calories in a food can be found on a Nutrition Facts label. If a food does not have a Nutrition Facts label, try to look up the calories online or ask your dietitian for help.  · Use your calories on foods and drinks that will fill you up, and not on foods and drinks that will leave you hungry.  · Use smaller plates, glasses, and bowls to prevent overeating.  This information is not intended to replace advice given to you by your health care provider. Make sure you discuss any questions you have with your health care provider.  Document Released: 12/18/2006 Document Revised: 09/06/2019 Document Reviewed: 11/17/2017  [employer] Patient Education © 2019 [employer]

## 2020-07-20 ENCOUNTER — OFFICE VISIT (OUTPATIENT)
Dept: FAMILY MEDICINE CLINIC | Facility: CLINIC | Age: 60
End: 2020-07-20

## 2020-07-20 VITALS
HEIGHT: 66 IN | DIASTOLIC BLOOD PRESSURE: 68 MMHG | BODY MASS INDEX: 35.71 KG/M2 | OXYGEN SATURATION: 95 % | TEMPERATURE: 98.1 F | RESPIRATION RATE: 16 BRPM | WEIGHT: 222.2 LBS | HEART RATE: 77 BPM | SYSTOLIC BLOOD PRESSURE: 125 MMHG

## 2020-07-20 DIAGNOSIS — R04.0 EPISTAXIS: ICD-10-CM

## 2020-07-20 DIAGNOSIS — I10 ESSENTIAL HYPERTENSION: ICD-10-CM

## 2020-07-20 DIAGNOSIS — E11.65 TYPE 2 DIABETES MELLITUS WITH HYPERGLYCEMIA, WITHOUT LONG-TERM CURRENT USE OF INSULIN (HCC): Primary | ICD-10-CM

## 2020-07-20 DIAGNOSIS — D72.829 LEUKOCYTOSIS, UNSPECIFIED TYPE: ICD-10-CM

## 2020-07-20 DIAGNOSIS — I25.119 CORONARY ARTERY DISEASE WITH ANGINA PECTORIS, UNSPECIFIED VESSEL OR LESION TYPE, UNSPECIFIED WHETHER NATIVE OR TRANSPLANTED HEART (HCC): ICD-10-CM

## 2020-07-20 DIAGNOSIS — M10.9 GOUT, UNSPECIFIED CAUSE, UNSPECIFIED CHRONICITY, UNSPECIFIED SITE: Chronic | ICD-10-CM

## 2020-07-20 DIAGNOSIS — E55.9 VITAMIN D DEFICIENCY: ICD-10-CM

## 2020-07-20 DIAGNOSIS — G47.33 OSA (OBSTRUCTIVE SLEEP APNEA): Chronic | ICD-10-CM

## 2020-07-20 DIAGNOSIS — J38.3 LESION OF VOCAL CORD: ICD-10-CM

## 2020-07-20 DIAGNOSIS — E66.09 CLASS 2 OBESITY DUE TO EXCESS CALORIES WITHOUT SERIOUS COMORBIDITY WITH BODY MASS INDEX (BMI) OF 35.0 TO 35.9 IN ADULT: ICD-10-CM

## 2020-07-20 DIAGNOSIS — I65.23 CAROTID STENOSIS, ASYMPTOMATIC, BILATERAL: ICD-10-CM

## 2020-07-20 DIAGNOSIS — Z12.11 SCREENING FOR MALIGNANT NEOPLASM OF COLON: ICD-10-CM

## 2020-07-20 PROCEDURE — 99214 OFFICE O/P EST MOD 30 MIN: CPT | Performed by: PREVENTIVE MEDICINE

## 2020-07-20 RX ORDER — TETANUS AND DIPHTHERIA TOXOIDS ADSORBED 2; 2 [LF]/.5ML; [LF]/.5ML
0.5 INJECTION INTRAMUSCULAR ONCE
Qty: 1 EACH | Refills: 0 | Status: SHIPPED | OUTPATIENT
Start: 2020-07-20 | End: 2020-07-20

## 2020-07-20 RX ORDER — IBUPROFEN 800 MG/1
800 TABLET ORAL 3 TIMES DAILY PRN
COMMUNITY
Start: 2020-07-07 | End: 2020-09-02

## 2020-07-20 NOTE — PROGRESS NOTES
"Patient reports for F/U DM and just had F/U with endocrinology.  Still hasn't gotten vaccinations.  Left nostril bleeding last few months but no other bleeding  Subjective   Dawson Lomax is a 59 y.o. male presents for   Chief Complaint   Patient presents with   • Diabetes     fasting   • Hypertension   • Hyperlipidemia       Health Maintenance Due   Topic Date Due   • TDAP/TD VACCINES (1 - Tdap) 12/20/1971   • ZOSTER VACCINE (1 of 2) 12/20/2010   • COLONOSCOPY  09/11/2017   • DIABETIC EYE EXAM  03/01/2020       History of Present Illness     Vitals:    07/20/20 0815 07/20/20 0817 07/20/20 0818   BP: 136/76 129/73 125/68   BP Location: Left arm Right arm Right arm   Patient Position: Sitting Sitting Standing   Cuff Size: Large Adult Large Adult Large Adult   Pulse: 71 71 77   Resp: 16     Temp: 98.1 °F (36.7 °C)     TempSrc: Infrared     SpO2: 95%     Weight: 101 kg (222 lb 3.2 oz)     Height: 167.6 cm (66\")       Body mass index is 35.86 kg/m².    Current Outpatient Medications on File Prior to Visit   Medication Sig Dispense Refill   • aspirin 81 MG EC tablet Take 81 mg by mouth Daily.     • atorvastatin (LIPITOR) 20 MG tablet Take 1 tablet by mouth Daily. 30 tablet 5   • cyclobenzaprine (FLEXERIL) 10 MG tablet Take 10 mg by mouth 2 (Two) Times a Day As Needed.  0   • Diclofenac Sodium 1.5 % solution APPLY TOPICALLY AS DIRECTED DAILY AS NEEDED FOR 30 DAYS  0   • dilTIAZem CD (Cartia XT) 300 MG 24 hr capsule Take 1 capsule by mouth Daily. 90 capsule 3   • Empagliflozin (Jardiance) 25 MG tablet Take 25 mg by mouth Daily. 30 tablet 2   • fenofibrate 160 MG tablet 1 tablet p.o. daily 30 tablet 6   • glyburide (DIAbeta) 5 MG tablet Take 2 tablets by mouth 2 (Two) Times a Day. 120 tablet 3   • hydroCHLOROthiazide (MICROZIDE) 12.5 MG capsule Take 1 capsule by mouth once daily 90 capsule 2   • ibuprofen (ADVIL,MOTRIN) 800 MG tablet Take 800 mg by mouth 3 (Three) Times a Day As Needed.     • ipratropium-albuterol " (DUO-NEB) 0.5-2.5 mg/3 ml nebulizer Take 3 mL by nebulization Every 4 (Four) Hours As Needed for Wheezing. 360 mL 3   • isosorbide mononitrate (IMDUR) 30 MG 24 hr tablet Take 1 tablet by mouth Daily. 90 tablet 3   • lidocaine (XYLOCAINE) 5 % ointment USE OINTMENT EXTERNALLY THREE TIMES A DAY FOR 30 DAYS AS NEEDED  0   • losartan (COZAAR) 100 MG tablet Take 1 tablet by mouth Daily. 90 tablet 3   • LYRICA 100 MG capsule Take 100 mg by mouth 2 (Two) Times a Day As Needed (patient states only takes as needed).  0   • metFORMIN ER (GLUCOPHAGE-XR) 500 MG 24 hr tablet Take 2 tablets by mouth twice daily 120 tablet 3   • montelukast (SINGULAIR) 10 MG tablet Take 10 mg by mouth Daily.  5   • oxyCODONE (ROXICODONE) 10 MG tablet Take 10 mg by mouth Every 6 (Six) Hours As Needed (patient states only takes as needed).  0   • pantoprazole (PROTONIX) 40 MG EC tablet Take 40 mg by mouth Daily. FOR 30 DAYS  6   • pioglitazone (ACTOS) 15 MG tablet Take 1 tablet by mouth once daily 30 tablet 5   • Semaglutide, 1 MG/DOSE, (OZEMPIC, 1 MG/DOSE,) 2 MG/1.5ML solution pen-injector Inject 1 mg under the skin into the appropriate area as directed 1 (One) Time Per Week. 3 mL 6   • sulindac (CLINORIL) 200 MG tablet Take 1 tablet by mouth 2 (Two) Times a Day. 60 tablet 1     Current Facility-Administered Medications on File Prior to Visit   Medication Dose Route Frequency Provider Last Rate Last Dose   • ipratropium-albuterol (DUO-NEB) nebulizer solution 3 mL  3 mL Nebulization 4x Daily - RT Kavitha Teran MD           The following portions of the patient's history were reviewed and updated as appropriate: allergies, current medications, past family history, past medical history, past social history, past surgical history and problem list.    Review of Systems   Constitutional: Negative.    HENT: Positive for nosebleeds. Negative for sinus pressure and sore throat.    Eyes: Negative.    Respiratory: Negative.  Negative for cough.     Cardiovascular: Negative.    Gastrointestinal: Negative.    Endocrine: Negative.    Genitourinary: Negative.    Musculoskeletal: Negative.    Skin: Negative.    Allergic/Immunologic: Positive for environmental allergies.   Neurological: Negative.    Hematological: Negative.    Psychiatric/Behavioral: Negative.        Objective   Physical Exam   Constitutional: He is oriented to person, place, and time. He appears well-developed and well-nourished.   overweight   HENT:   Head: Normocephalic and atraumatic.   Eyes: Pupils are equal, round, and reactive to light. Conjunctivae and EOM are normal.   Neck: Normal range of motion. Neck supple.   Cardiovascular: Normal rate, regular rhythm, normal heart sounds and intact distal pulses.   Pulmonary/Chest: Effort normal and breath sounds normal.   Abdominal: Soft. Bowel sounds are normal.   Musculoskeletal: Normal range of motion.   Neurological: He is alert and oriented to person, place, and time.   Skin: Skin is warm and dry.   Psychiatric: He has a normal mood and affect.   Nursing note and vitals reviewed.    PHQ-9 Total Score:      Assessment/Plan   Dawson was seen today for diabetes, hypertension and hyperlipidemia.    Diagnoses and all orders for this visit:    Type 2 diabetes mellitus with hyperglycemia, without long-term current use of insulin (CMS/[facility])  Comments:  Controlled with Ozempic with A1C 7.4    Leukocytosis, unspecified type  Comments:  No fever but asplenic    Lesion of vocal cord  Comments:  States gone but will check ENT    Coronary artery disease with angina pectoris, unspecified vessel or lesion type, unspecified whether native or transplanted heart (CMS/[facility])  Comments:  No chest pain    Gout, unspecified cause, unspecified chronicity, unspecified site  Comments:  No problems    Vitamin D deficiency    MELISSA (obstructive sleep apnea)  Comments:  Wear mask    Essential hypertension  Comments:  Controlled    Class 2 obesity due to excess calories without  serious comorbidity with body mass index (BMI) of 35.0 to 35.9 in adult  Comments:  diet     Carotid stenosis, asymptomatic, bilateral  Comments:  To repeated in fall  Orders:  -     Ambulatory Referral to Vascular Surgery; Future    Screening for malignant neoplasm of colon  -     Cologuard - Stool, Per Rectum; Future    Epistaxis  Comments:  New last coup[le of months l nostril only and no other bleeding prolongation when cuts self, no stool or urine blood.  Orders:  -     Ambulatory Referral to ENT (Otolaryngology)    Other orders  -     tetanus-diphtheria toxoids (DECAVAC 2-2) 2-2 LF/0.5ML injection; Inject 0.5 mL into the appropriate muscle as directed by prescriber 1 (One) Time for 1 dose.  -     Zoster Vac Recomb Adjuvanted (Shingrix) 50 MCG/0.5ML reconstituted suspension; Inject 0.5 mL into the appropriate muscle as directed by prescriber 1 (One) Time for 1 dose. To be administered at the pharmacy  -     pneumococcal polysaccharide 23-valent (Pneumovax 23) 25 MCG/0.5ML vaccine; Inject 0.5 mL into the appropriate muscle as directed by prescriber 1 (One) Time for 1 dose. To be administered at the pharmacy  -     haemophilus B polysac-tetanus toxoid, ActHIB, (ActHIB) injection; Inject 0.5 mL into the appropriate muscle as directed by prescriber 1 (One) Time for 1 dose. To be administered at the pharmacy        Patient Instructions   Advise Td, Shingrix, pneumonia 23, HIB at pharmacy.    Advise eye exam, dental  and colonoscopy    Calorie Counting for Weight Loss  Calories are units of energy. Your body needs a certain amount of calories from food to keep you going throughout the day. When you eat more calories than your body needs, your body stores the extra calories as fat. When you eat fewer calories than your body needs, your body burns fat to get the energy it needs.  Calorie counting means keeping track of how many calories you eat and drink each day. Calorie counting can be helpful if you need to lose  weight. If you make sure to eat fewer calories than your body needs, you should lose weight. Ask your health care provider what a healthy weight is for you.  For calorie counting to work, you will need to eat the right number of calories in a day in order to lose a healthy amount of weight per week. A dietitian can help you determine how many calories you need in a day and will give you suggestions on how to reach your calorie goal.  · A healthy amount of weight to lose per week is usually 1-2 lb (0.5-0.9 kg). This usually means that your daily calorie intake should be reduced by 500-750 calories.  · Eating 1,200 - 1,500 calories per day can help most women lose weight.  · Eating 1,500 - 1,800 calories per day can help most men lose weight.  What is my plan?  My goal is to have __________ calories per day.  If I have this many calories per day, I should lose around __________ pounds per week.  What do I need to know about calorie counting?  In order to meet your daily calorie goal, you will need to:  · Find out how many calories are in each food you would like to eat. Try to do this before you eat.  · Decide how much of the food you plan to eat.  · Write down what you ate and how many calories it had. Doing this is called keeping a food log.  To successfully lose weight, it is important to balance calorie counting with a healthy lifestyle that includes regular activity. Aim for 150 minutes of moderate exercise (such as walking) or 75 minutes of vigorous exercise (such as running) each week.  Where do I find calorie information?      The number of calories in a food can be found on a Nutrition Facts label. If a food does not have a Nutrition Facts label, try to look up the calories online or ask your dietitian for help.  Remember that calories are listed per serving. If you choose to have more than one serving of a food, you will have to multiply the calories per serving by the amount of servings you plan to eat. For  "example, the label on a package of bread might say that a serving size is 1 slice and that there are 90 calories in a serving. If you eat 1 slice, you will have eaten 90 calories. If you eat 2 slices, you will have eaten 180 calories.  How do I keep a food log?  Immediately after each meal, record the following information in your food log:  · What you ate. Don't forget to include toppings, sauces, and other extras on the food.  · How much you ate. This can be measured in cups, ounces, or number of items.  · How many calories each food and drink had.  · The total number of calories in the meal.  Keep your food log near you, such as in a small notebook in your pocket, or use a mobile vidya or website. Some programs will calculate calories for you and show you how many calories you have left for the day to meet your goal.  What are some calorie counting tips?      · Use your calories on foods and drinks that will fill you up and not leave you hungry:  ? Some examples of foods that fill you up are nuts and nut butters, vegetables, lean proteins, and high-fiber foods like whole grains. High-fiber foods are foods with more than 5 g fiber per serving.  ? Drinks such as sodas, specialty coffee drinks, alcohol, and juices have a lot of calories, yet do not fill you up.  · Eat nutritious foods and avoid empty calories. Empty calories are calories you get from foods or beverages that do not have many vitamins or protein, such as candy, sweets, and soda. It is better to have a nutritious high-calorie food (such as an avocado) than a food with few nutrients (such as a bag of chips).  · Know how many calories are in the foods you eat most often. This will help you calculate calorie counts faster.  · Pay attention to calories in drinks. Low-calorie drinks include water and unsweetened drinks.  · Pay attention to nutrition labels for \"low fat\" or \"fat free\" foods. These foods sometimes have the same amount of calories or more " calories than the full fat versions. They also often have added sugar, starch, or salt, to make up for flavor that was removed with the fat.  · Find a way of tracking calories that works for you. Get creative. Try different apps or programs if writing down calories does not work for you.  What are some portion control tips?  · Know how many calories are in a serving. This will help you know how many servings of a certain food you can have.  · Use a measuring cup to measure serving sizes. You could also try weighing out portions on a kitchen scale. With time, you will be able to estimate serving sizes for some foods.  · Take some time to put servings of different foods on your favorite plates, bowls, and cups so you know what a serving looks like.  · Try not to eat straight from a bag or box. Doing this can lead to overeating. Put the amount you would like to eat in a cup or on a plate to make sure you are eating the right portion.  · Use smaller plates, glasses, and bowls to prevent overeating.  · Try not to multitask (for example, watch TV or use your computer) while eating. If it is time to eat, sit down at a table and enjoy your food. This will help you to know when you are full. It will also help you to be aware of what you are eating and how much you are eating.  What are tips for following this plan?  Reading food labels  · Check the calorie count compared to the serving size. The serving size may be smaller than what you are used to eating.  · Check the source of the calories. Make sure the food you are eating is high in vitamins and protein and low in saturated and trans fats.  Shopping  · Read nutrition labels while you shop. This will help you make healthy decisions before you decide to purchase your food.  · Make a grocery list and stick to it.  Cooking  · Try to cook your favorite foods in a healthier way. For example, try baking instead of frying.  · Use low-fat dairy products.  Meal planning  · Use  "more fruits and vegetables. Half of your plate should be fruits and vegetables.  · Include lean proteins like poultry and fish.  How do I count calories when eating out?  · Ask for smaller portion sizes.  · Consider sharing an entree and sides instead of getting your own entree.  · If you get your own entree, eat only half. Ask for a box at the beginning of your meal and put the rest of your entree in it so you are not tempted to eat it.  · If calories are listed on the menu, choose the lower calorie options.  · Choose dishes that include vegetables, fruits, whole grains, low-fat dairy products, and lean protein.  · Choose items that are boiled, broiled, grilled, or steamed. Stay away from items that are buttered, battered, fried, or served with cream sauce. Items labeled \"crispy\" are usually fried, unless stated otherwise.  · Choose water, low-fat milk, unsweetened iced tea, or other drinks without added sugar. If you want an alcoholic beverage, choose a lower calorie option such as a glass of wine or light beer.  · Ask for dressings, sauces, and syrups on the side. These are usually high in calories, so you should limit the amount you eat.  · If you want a salad, choose a garden salad and ask for grilled meats. Avoid extra toppings like cline, cheese, or fried items. Ask for the dressing on the side, or ask for olive oil and vinegar or lemon to use as dressing.  · Estimate how many servings of a food you are given. For example, a serving of cooked rice is ½ cup or about the size of half a baseball. Knowing serving sizes will help you be aware of how much food you are eating at restaurants. The list below tells you how big or small some common portion sizes are based on everyday objects:  ? 1 oz--4 stacked dice.  ? 3 oz--1 deck of cards.  ? 1 tsp--1 die.  ? 1 Tbsp--½ a ping-pong ball.  ? 2 Tbsp--1 ping-pong ball.  ? ½ cup--½ baseball.  ? 1 cup--1 baseball.  Summary  · Calorie counting means keeping track of how " many calories you eat and drink each day. If you eat fewer calories than your body needs, you should lose weight.  · A healthy amount of weight to lose per week is usually 1-2 lb (0.5-0.9 kg). This usually means reducing your daily calorie intake by 500-750 calories.  · The number of calories in a food can be found on a Nutrition Facts label. If a food does not have a Nutrition Facts label, try to look up the calories online or ask your dietitian for help.  · Use your calories on foods and drinks that will fill you up, and not on foods and drinks that will leave you hungry.  · Use smaller plates, glasses, and bowls to prevent overeating.  This information is not intended to replace advice given to you by your health care provider. Make sure you discuss any questions you have with your health care provider.  Document Released: 12/18/2006 Document Revised: 09/06/2019 Document Reviewed: 11/17/2017  [employer] Interactive Patient Education © 2019 [employer]

## 2020-07-21 ENCOUNTER — TELEPHONE (OUTPATIENT)
Dept: ENDOCRINOLOGY | Facility: CLINIC | Age: 60
End: 2020-07-21

## 2020-07-21 DIAGNOSIS — E11.65 TYPE 2 DIABETES MELLITUS WITH HYPERGLYCEMIA, WITHOUT LONG-TERM CURRENT USE OF INSULIN (HCC): ICD-10-CM

## 2020-07-21 DIAGNOSIS — N52.9 IMPOTENCE: Primary | ICD-10-CM

## 2020-07-21 DIAGNOSIS — I10 ESSENTIAL HYPERTENSION: ICD-10-CM

## 2020-07-21 DIAGNOSIS — E78.2 MIXED HYPERLIPIDEMIA: ICD-10-CM

## 2020-07-21 NOTE — TELEPHONE ENCOUNTER
Patient called back frustrated. He doesn't understand how this can be normal with everything going on. He wants answers and would like to speak with you.

## 2020-07-21 NOTE — TELEPHONE ENCOUNTER
His total and free testosterone are normal however his FSH is high at this could be due to testicular dysfunction.  Testosterone level is not low therefore will hold on replacement for now and follow total and free testosterone with sex hormone binding globulin before next follow-up.

## 2020-07-30 ENCOUNTER — LAB (OUTPATIENT)
Dept: LAB | Facility: HOSPITAL | Age: 60
End: 2020-07-30

## 2020-07-30 DIAGNOSIS — E78.2 MIXED HYPERLIPIDEMIA: ICD-10-CM

## 2020-07-30 DIAGNOSIS — I10 ESSENTIAL HYPERTENSION: ICD-10-CM

## 2020-07-30 DIAGNOSIS — N52.9 IMPOTENCE: ICD-10-CM

## 2020-07-30 DIAGNOSIS — E11.65 TYPE 2 DIABETES MELLITUS WITH HYPERGLYCEMIA, WITHOUT LONG-TERM CURRENT USE OF INSULIN (HCC): ICD-10-CM

## 2020-07-30 LAB
FSH SERPL-ACNC: 18.9 MIU/ML
LH SERPL-ACNC: 8.16 MIU/ML

## 2020-07-30 PROCEDURE — 83001 ASSAY OF GONADOTROPIN (FSH): CPT

## 2020-07-30 PROCEDURE — 36415 COLL VENOUS BLD VENIPUNCTURE: CPT

## 2020-07-30 PROCEDURE — 84402 ASSAY OF FREE TESTOSTERONE: CPT

## 2020-07-30 PROCEDURE — 83002 ASSAY OF GONADOTROPIN (LH): CPT

## 2020-07-30 PROCEDURE — 84403 ASSAY OF TOTAL TESTOSTERONE: CPT

## 2020-08-03 LAB
TESTOST FREE SERPL-MCNC: 9 PG/ML (ref 7.2–24)
TESTOST SERPL-MCNC: 405 NG/DL (ref 264–916)

## 2020-08-05 ENCOUNTER — TELEPHONE (OUTPATIENT)
Dept: ENDOCRINOLOGY | Facility: CLINIC | Age: 60
End: 2020-08-05

## 2020-08-07 NOTE — TELEPHONE ENCOUNTER
Patient just called back and is states he has another important question to ask you & asks if you wouldn't mind to call him back again to discuss?

## 2020-08-07 NOTE — TELEPHONE ENCOUNTER
Patient was calling again in regards to his test results. Please advise. I notified him you were seeing patients and I would get back to him as soon as we could.

## 2020-08-13 NOTE — TELEPHONE ENCOUNTER
Patient wanted to get the MRI however he is asymptomatic and his labs are consistent with testicular failure.  No indication for MRI at this time.  Patient was reassured.  Will discuss further if needed at the next visit.

## 2020-08-24 PROBLEM — I25.10 CORONARY ARTERY DISEASE INVOLVING NATIVE CORONARY ARTERY OF NATIVE HEART WITHOUT ANGINA PECTORIS: Status: ACTIVE | Noted: 2019-07-02

## 2020-08-24 PROBLEM — Z01.818 PRE-OP EXAMINATION: Status: ACTIVE | Noted: 2020-08-24

## 2020-09-02 ENCOUNTER — OFFICE VISIT (OUTPATIENT)
Dept: CARDIOLOGY | Facility: CLINIC | Age: 60
End: 2020-09-02

## 2020-09-02 VITALS
HEART RATE: 65 BPM | BODY MASS INDEX: 35.68 KG/M2 | HEIGHT: 66 IN | SYSTOLIC BLOOD PRESSURE: 138 MMHG | WEIGHT: 222 LBS | DIASTOLIC BLOOD PRESSURE: 78 MMHG

## 2020-09-02 DIAGNOSIS — I10 ESSENTIAL HYPERTENSION: ICD-10-CM

## 2020-09-02 DIAGNOSIS — Z01.818 PRE-OP EXAMINATION: ICD-10-CM

## 2020-09-02 DIAGNOSIS — I25.10 CORONARY ARTERY DISEASE INVOLVING NATIVE CORONARY ARTERY OF NATIVE HEART WITHOUT ANGINA PECTORIS: Primary | ICD-10-CM

## 2020-09-02 DIAGNOSIS — E78.2 MIXED HYPERLIPIDEMIA: ICD-10-CM

## 2020-09-02 PROCEDURE — 93000 ELECTROCARDIOGRAM COMPLETE: CPT | Performed by: INTERNAL MEDICINE

## 2020-09-02 PROCEDURE — 99214 OFFICE O/P EST MOD 30 MIN: CPT | Performed by: INTERNAL MEDICINE

## 2020-09-02 RX ORDER — DILTIAZEM HYDROCHLORIDE 240 MG/1
240 CAPSULE, COATED, EXTENDED RELEASE ORAL DAILY
COMMUNITY
End: 2020-10-28

## 2020-09-02 NOTE — PROGRESS NOTES
Date of Office Visit: 2020  Encounter Provider: Dr. Prashanth Parikh  Place of Service: [facility] CARDIOLOGY [place]  Patient Name: Dawson Lomax  :1960  Kavitha Teran MD    Chief Complaint   Patient presents with   • Pre-op Exam     9 month follow up   • Coronary Artery Disease   • Hypertension   • Hyperlipidemia     History of Present Illness    I am pleased to see  in my office today as a follow-up.     As you know, patient is 59-year-old white gentleman whose past medical history is significant for hypertension, diabetes mellitus, hyperlipidemia, who came today for follow-up     In , patient was admitted with the symptom of chest pain.  patient underwent cardiac catheterization which showed nonobstructive CAD.  In 2017, patient underwent stress test again with chest pain.  Patient underwent stress test which showed possible inferior wall ischemia.  Repeat cardiac catheterization showed nonobstructive CAD.  Medical opinion treatment was recommended.    In 2019, patient was presented to me for symptom of left-sided chest pain.  At that time, patient also had CAT scan of chest which showed calcification of coronary arteries.  I recommended to proceed with stress test but patient did not have stress test.    Patient came today for preop clearance.  Patient is diagnosed with osteoarthritis of his neck.  Patient denies any orthopnea PND.  Patient does have mild shortness of breath.  Patient occasionally complained of left-sided chest pain.  No orthopnea or PND no leg edema noted.    EKG showed sinus rhythm with leftward axis.    I would recommend to proceed with stress test with Cardiolite imaging prior to the surgery if it is negative patient can proceed with surgery as planned.  Patient cannot walk on a treadmill.    Past Medical History:   Diagnosis Date   • B12 deficiency 2015   • Broken finger     broken middle finger   • Coronary artery disease with  angina pectoris (CMS/[facility]) 7/2/2019   • Gout    • Heart attack (CMS/[facility]) 2017   • Hx of migraines    • Hyperlipidemia    • Hypertension    • Plantar fasciitis, left 1/9/2017         Past Surgical History:   Procedure Laterality Date   • ANKLE SURGERY Left 2001   • CARDIAC CATHETERIZATION  2017   • CARPAL TUNNEL RELEASE Bilateral    • CATARACT EXTRACTION  2015   • HERNIA REPAIR     • OTHER SURGICAL HISTORY  2017    MI with Stent 2010, 2017    • SPLENECTOMY             Current Outpatient Medications:   •  aspirin 81 MG EC tablet, Take 81 mg by mouth Daily., Disp: , Rfl:   •  atorvastatin (LIPITOR) 20 MG tablet, Take 1 tablet by mouth Daily., Disp: 30 tablet, Rfl: 5  •  dilTIAZem CD (Cartia XT) 240 MG 24 hr capsule, Take 240 mg by mouth Daily., Disp: , Rfl:   •  Empagliflozin (Jardiance) 25 MG tablet, Take 25 mg by mouth Daily., Disp: 30 tablet, Rfl: 2  •  fenofibrate 160 MG tablet, 1 tablet p.o. daily, Disp: 30 tablet, Rfl: 6  •  glyburide (DIAbeta) 5 MG tablet, Take 2 tablets by mouth 2 (Two) Times a Day., Disp: 120 tablet, Rfl: 3  •  hydroCHLOROthiazide (MICROZIDE) 12.5 MG capsule, Take 1 capsule by mouth once daily, Disp: 90 capsule, Rfl: 2  •  ipratropium-albuterol (DUO-NEB) 0.5-2.5 mg/3 ml nebulizer, Take 3 mL by nebulization Every 4 (Four) Hours As Needed for Wheezing., Disp: 360 mL, Rfl: 3  •  lidocaine (XYLOCAINE) 5 % ointment, USE OINTMENT EXTERNALLY THREE TIMES A DAY FOR 30 DAYS AS NEEDED, Disp: , Rfl: 0  •  losartan (COZAAR) 100 MG tablet, Take 1 tablet by mouth Daily., Disp: 90 tablet, Rfl: 3  •  LYRICA 100 MG capsule, Take 100 mg by mouth 2 (Two) Times a Day As Needed (patient states only takes as needed)., Disp: , Rfl: 0  •  metoprolol tartrate (LOPRESSOR) 25 MG tablet, Take 25 mg by mouth 2 (Two) Times a Day., Disp: , Rfl:   •  Semaglutide, 1 MG/DOSE, (OZEMPIC, 1 MG/DOSE,) 2 MG/1.5ML solution pen-injector, Inject 1 mg under the skin into the appropriate area as directed 1 (One) Time Per Week., Disp: 3  "mL, Rfl: 6    Current Facility-Administered Medications:   •  ipratropium-albuterol (DUO-NEB) nebulizer solution 3 mL, 3 mL, Nebulization, 4x Daily - RT, Kavitha Teran MD      Social History     Socioeconomic History   • Marital status:      Spouse name: Not on file   • Number of children: Not on file   • Years of education: Not on file   • Highest education level: Not on file   Tobacco Use   • Smoking status: Former Smoker     Packs/day: 0.50     Types: Cigarettes     Last attempt to quit:      Years since quittin.6   • Smokeless tobacco: Never Used   Substance and Sexual Activity   • Alcohol use: No     Frequency: Never   • Drug use: No   • Sexual activity: Defer         Review of Systems   Constitution: Negative for chills and fever.   HENT: Negative for ear discharge and nosebleeds.    Eyes: Negative for discharge and redness.   Cardiovascular: Negative for chest pain, orthopnea, palpitations, paroxysmal nocturnal dyspnea and syncope.   Respiratory: Positive for shortness of breath. Negative for cough and wheezing.    Endocrine: Negative for heat intolerance.   Skin: Negative for rash.   Musculoskeletal: Positive for arthritis, back pain, joint pain and neck pain. Negative for myalgias.   Gastrointestinal: Negative for abdominal pain, melena, nausea and vomiting.   Genitourinary: Negative for dysuria and hematuria.   Neurological: Negative for dizziness, light-headedness, numbness and tremors.   Psychiatric/Behavioral: Negative for depression. The patient is not nervous/anxious.        Procedures      ECG 12 Lead  Date/Time: 2020 9:56 AM  Performed by: Prashanth Parikh MD  Authorized by: Prashanth Parikh MD   Comparison: compared with previous ECG   Similar to previous ECG  Rhythm: sinus rhythm  Conduction: left anterior fascicular block    Clinical impression: normal ECG            ECG 12 Lead    (Results Pending)           Objective:    /78   Pulse 65   Ht 167.6 cm (65.98\")   Wt " 101 kg (222 lb)   BMI 35.85 kg/m²         Physical Exam   Constitutional: He is oriented to person, place, and time. He appears well-developed and well-nourished.   HENT:   Head: Normocephalic and atraumatic.   Eyes: No scleral icterus.   Neck: No thyromegaly present.   Cardiovascular: Normal rate, regular rhythm and normal heart sounds. Exam reveals no gallop and no friction rub.   No murmur heard.  Pulmonary/Chest: Effort normal and breath sounds normal. No respiratory distress. He has no wheezes. He has no rales.   Abdominal: There is no tenderness.   Musculoskeletal: He exhibits no edema.   Lymphadenopathy:     He has no cervical adenopathy.   Neurological: He is alert and oriented to person, place, and time.   Skin: No rash noted. No erythema.   Psychiatric: He has a normal mood and affect.           Assessment:       Diagnosis Plan   1. Coronary artery disease involving native coronary artery of native heart without angina pectoris  ECG 12 Lead    Stress Test With Myocardial Perfusion One Day   2. Essential hypertension  ECG 12 Lead    Stress Test With Myocardial Perfusion One Day   3. Mixed hyperlipidemia  ECG 12 Lead    Stress Test With Myocardial Perfusion One Day   4. Pre-op examination  ECG 12 Lead    Stress Test With Myocardial Perfusion One Day            Plan:       I would recommend to proceed with stress test if it is negative patient can be cleared for surgery

## 2020-09-10 ENCOUNTER — HOSPITAL ENCOUNTER (OUTPATIENT)
Dept: NUCLEAR MEDICINE | Facility: HOSPITAL | Age: 60
Discharge: HOME | End: 2020-09-10

## 2020-09-10 DIAGNOSIS — E78.2 MIXED HYPERLIPIDEMIA: ICD-10-CM

## 2020-09-10 DIAGNOSIS — I10 ESSENTIAL HYPERTENSION: ICD-10-CM

## 2020-09-10 DIAGNOSIS — Z01.818 PRE-OP EXAMINATION: ICD-10-CM

## 2020-09-10 DIAGNOSIS — I25.10 CORONARY ARTERY DISEASE INVOLVING NATIVE CORONARY ARTERY OF NATIVE HEART WITHOUT ANGINA PECTORIS: ICD-10-CM

## 2020-09-10 PROCEDURE — 93017 CV STRESS TEST TRACING ONLY: CPT

## 2020-09-10 PROCEDURE — 25010000002 REGADENOSON 0.4 MG/5ML SOLUTION: Performed by: INTERNAL MEDICINE

## 2020-09-10 PROCEDURE — A9500 TC99M SESTAMIBI: HCPCS | Performed by: INTERNAL MEDICINE

## 2020-09-10 PROCEDURE — 0 TECHNETIUM SESTAMIBI: Performed by: INTERNAL MEDICINE

## 2020-09-10 PROCEDURE — 78452 HT MUSCLE IMAGE SPECT MULT: CPT

## 2020-09-10 RX ADMIN — REGADENOSON 0.4 MG: 0.08 INJECTION, SOLUTION INTRAVENOUS at 13:30

## 2020-09-10 RX ADMIN — TECHNETIUM TC 99M SESTAMIBI 1 DOSE: 1 INJECTION INTRAVENOUS at 12:35

## 2020-09-10 RX ADMIN — TECHNETIUM TC 99M SESTAMIBI 1 DOSE: 1 INJECTION INTRAVENOUS at 13:30

## 2020-09-11 ENCOUNTER — TELEPHONE (OUTPATIENT)
Dept: CARDIOLOGY | Facility: CLINIC | Age: 60
End: 2020-09-11

## 2020-09-11 LAB
BH CV NUCLEAR PRIOR STUDY: 3
BH CV STRESS BP STAGE 1: NORMAL
BH CV STRESS BP STAGE 2: NORMAL
BH CV STRESS COMMENTS STAGE 1: NORMAL
BH CV STRESS COMMENTS STAGE 2: NORMAL
BH CV STRESS DOSE REGADENOSON STAGE 1: 0.4
BH CV STRESS DURATION MIN STAGE 1: 0
BH CV STRESS DURATION MIN STAGE 2: 4
BH CV STRESS DURATION SEC STAGE 1: 10
BH CV STRESS DURATION SEC STAGE 2: 0
BH CV STRESS HR STAGE 1: 85
BH CV STRESS HR STAGE 2: 72
BH CV STRESS PROTOCOL 1: NORMAL
BH CV STRESS RECOVERY BP: NORMAL MMHG
BH CV STRESS RECOVERY HR: 72 BPM
BH CV STRESS STAGE 1: 1
BH CV STRESS STAGE 2: 2
LV EF NUC BP: 65 %
MAXIMAL PREDICTED HEART RATE: 161 BPM
PERCENT MAX PREDICTED HR: 52.8 %
STRESS BASELINE BP: NORMAL MMHG
STRESS BASELINE HR: 68 BPM
STRESS PERCENT HR: 62 %
STRESS POST PEAK BP: NORMAL MMHG
STRESS POST PEAK HR: 85 BPM
STRESS TARGET HR: 137 BPM

## 2020-09-11 PROCEDURE — 78452 HT MUSCLE IMAGE SPECT MULT: CPT | Performed by: INTERNAL MEDICINE

## 2020-09-11 PROCEDURE — 93018 CV STRESS TEST I&R ONLY: CPT | Performed by: INTERNAL MEDICINE

## 2020-09-11 NOTE — TELEPHONE ENCOUNTER
Romina calling with [facility] Patient had a stress test yesterday for surgery clearance  She can see it has been done and the report is pending  He is due for surgery on Monday Please call [phone]

## 2020-09-17 ENCOUNTER — TRANSITIONAL CARE MANAGEMENT TELEPHONE ENCOUNTER (OUTPATIENT)
Dept: FAMILY MEDICINE CLINIC | Facility: CLINIC | Age: 60
End: 2020-09-17

## 2020-09-18 NOTE — OUTREACH NOTE
Spoke with wife patient was in the back round he is not able to talk much with his surgery. He follow ups with his surgeon on Monday. He will call us if he needs an appt to be seen. TCM can not be made due because not done in the 2 day time frame.

## 2020-09-21 ENCOUNTER — TRANSITIONAL CARE MANAGEMENT TELEPHONE ENCOUNTER (OUTPATIENT)
Dept: FAMILY MEDICINE CLINIC | Facility: CLINIC | Age: 60
End: 2020-09-21

## 2020-09-28 PROBLEM — M47.22 CERVICAL SPONDYLOSIS WITH RADICULOPATHY: Status: ACTIVE | Noted: 2020-08-14

## 2020-09-28 NOTE — PATIENT INSTRUCTIONS
Advise flu, Td, Hep B, and ShingRix vaccinations    Advise colonoscopy    Advise diabetic eye exam

## 2020-09-29 ENCOUNTER — OFFICE VISIT (OUTPATIENT)
Dept: FAMILY MEDICINE CLINIC | Facility: CLINIC | Age: 60
End: 2020-09-29

## 2020-09-29 VITALS
SYSTOLIC BLOOD PRESSURE: 139 MMHG | OXYGEN SATURATION: 96 % | HEART RATE: 76 BPM | DIASTOLIC BLOOD PRESSURE: 74 MMHG | WEIGHT: 215 LBS | RESPIRATION RATE: 18 BRPM | BODY MASS INDEX: 34.55 KG/M2 | TEMPERATURE: 99.3 F | HEIGHT: 66 IN

## 2020-09-29 DIAGNOSIS — E11.65 TYPE 2 DIABETES MELLITUS WITH HYPERGLYCEMIA, WITHOUT LONG-TERM CURRENT USE OF INSULIN (HCC): ICD-10-CM

## 2020-09-29 DIAGNOSIS — M47.22 CERVICAL SPONDYLOSIS WITH RADICULOPATHY: Primary | ICD-10-CM

## 2020-09-29 DIAGNOSIS — R01.1 HEART MURMUR: ICD-10-CM

## 2020-09-29 DIAGNOSIS — Z12.11 SCREENING FOR COLON CANCER: ICD-10-CM

## 2020-09-29 PROCEDURE — 99214 OFFICE O/P EST MOD 30 MIN: CPT | Performed by: PREVENTIVE MEDICINE

## 2020-09-29 RX ORDER — IBUPROFEN 800 MG/1
1 TABLET ORAL AS NEEDED
COMMUNITY
Start: 2020-09-10 | End: 2020-10-28

## 2020-09-29 RX ORDER — OXYCODONE HYDROCHLORIDE 10 MG/1
10 TABLET ORAL EVERY 6 HOURS PRN
COMMUNITY
Start: 2020-09-10

## 2020-09-29 RX ORDER — DILTIAZEM HYDROCHLORIDE 300 MG/1
300 CAPSULE, EXTENDED RELEASE ORAL DAILY
COMMUNITY
End: 2021-06-21 | Stop reason: SDUPTHER

## 2020-09-29 RX ORDER — METFORMIN HYDROCHLORIDE 500 MG/1
1000 TABLET, EXTENDED RELEASE ORAL 2 TIMES DAILY
COMMUNITY
Start: 2020-09-18 | End: 2020-11-05 | Stop reason: SDUPTHER

## 2020-09-29 RX ORDER — CYCLOBENZAPRINE HCL 10 MG
10 TABLET ORAL 2 TIMES DAILY PRN
COMMUNITY
Start: 2020-09-15 | End: 2022-06-14

## 2020-09-29 NOTE — PROGRESS NOTES
"Transitional Care Follow Up Visit  Subjective     Dawson Lomax is a 59 y.o. male who presents for a transitional care management visit.    Within 48 business hours after discharge our office contacted him via telephone to coordinate his care and needs.      I reviewed and discussed the details of that call along with the discharge summary, hospital problems, inpatient lab results, inpatient diagnostic studies, and consultation reports with Dawson.     Vitals:    09/29/20 1119 09/29/20 1120   BP: 155/69 139/74   BP Location: Left arm Right arm   Patient Position: Sitting Sitting   Cuff Size: Adult Adult   Pulse: 78 76   Resp: 18    Temp: 99.3 °F (37.4 °C)    TempSrc: Temporal    SpO2: 96%    Weight: 97.5 kg (215 lb)    Height: 167.6 cm (66\")      Body mass index is 34.7 kg/m².    Current outpatient and discharge medications have been reconciled for the patient.    Date of TCM Phone Call 7/25/2019 7/25/2019 9/18/2020 9/22/2020   [facility] -   Date of Admission 7/22/2019 7/22/2019 9/14/2020 -   Date of Discharge 7/23/2019 7/23/2019 9/15/2020 -   Discharge Disposition - Home or Self Care Home or Self Care Home or Self Care     Risk for Readmission (LACE) No data recorded    Still some mild neck pain after 4 discs replaced in neck.  No arm, leg, bowel, bladder symptoms.  No fever, urinating and BM ok.  He is concerned  With way scar looks.  No fever or drainage.  Swallowing and voice ok.  He is having problems not working but was advised not to till released neurosurgeon.  Seems like out of breath easily.  Preop stress test ok.  Cardiology heard no heart murmur     Course During Hospital Stay:  Max's     The following portions of the patient's history were reviewed and updated as appropriate: allergies, current medications, past family history, past medical history, past social history, past surgical history and problem list.    Review of Systems   Constitutional: Negative.    Eyes: Negative.  "   Respiratory: Positive for cough, chest tightness, shortness of breath and wheezing.    Cardiovascular: Negative.    Gastrointestinal: Negative.    Endocrine: Negative.    Genitourinary: Negative.    Musculoskeletal: Positive for neck pain and neck stiffness.   Skin: Negative.    Allergic/Immunologic: Negative.    Neurological: Negative.    Hematological: Negative.    Psychiatric/Behavioral: Negative.        Objective   Physical Exam  Constitutional:       General: He is not in acute distress.     Appearance: He is obese. He is not ill-appearing or diaphoretic.   HENT:      Head: Normocephalic.      Nose: Nose normal.   Eyes:      Extraocular Movements: Extraocular movements intact.      Pupils: Pupils are equal, round, and reactive to light.   Neck:      Musculoskeletal: Neck supple.      Comments: 75% ROM with pain at end range of motion  Cardiovascular:      Rate and Rhythm: Normal rate and regular rhythm.      Heart sounds: Murmur present. No friction rub. No gallop.       Comments: Sharp pinging heart murmur new to this patient Grade 2/5 LSB  Pulmonary:      Comments: Decreased breath sound fanny    Abdominal:      General: There is no distension.      Palpations: Abdomen is soft. There is no mass.      Tenderness: There is no abdominal tenderness. There is no guarding.   Musculoskeletal: Normal range of motion.   Skin:     General: Skin is warm.      Comments: Op site R ant neck healing well   Neurological:      Mental Status: He is alert and oriented to person, place, and time.   Psychiatric:         Mood and Affect: Mood normal.         Assessment/Plan   Dawson was seen today for hospital follow up visit.    Diagnoses and all orders for this visit:    Cervical spondylosis with radiculopathy  Comments:  Healing well and improved discomfort    Screening for colon cancer  Comments:  Advised colonoscopy  Orders:  -     Cologuard - Stool, Per Rectum; Future    Heart murmur  Comments:  Echocardiogram  Orders:  -      Adult Transthoracic Echo Complete W/ Cont if Necessary Per Protocol; Future    Type 2 diabetes mellitus with hyperglycemia, without long-term current use of insulin (CMS/[facility])  Comments:  Sugars have been up and down-probably due to meds and surgery        Patient Instructions   Advise flu, Td, Hep B, and ShingRix vaccinations    Advise colonoscopy    Advise diabetic eye exam

## 2020-10-12 RX ORDER — SEMAGLUTIDE 1.34 MG/ML
INJECTION, SOLUTION SUBCUTANEOUS
Qty: 4 ML | Refills: 2 | Status: SHIPPED | OUTPATIENT
Start: 2020-10-12 | End: 2020-11-05 | Stop reason: SDUPTHER

## 2020-10-15 ENCOUNTER — HOSPITAL ENCOUNTER (OUTPATIENT)
Dept: CARDIOLOGY | Facility: HOSPITAL | Age: 60
Discharge: HOME | End: 2020-10-15
Admitting: PREVENTIVE MEDICINE

## 2020-10-15 VITALS
DIASTOLIC BLOOD PRESSURE: 69 MMHG | BODY MASS INDEX: 34.55 KG/M2 | SYSTOLIC BLOOD PRESSURE: 125 MMHG | HEIGHT: 66 IN | WEIGHT: 215 LBS

## 2020-10-15 DIAGNOSIS — R01.1 HEART MURMUR: ICD-10-CM

## 2020-10-15 PROCEDURE — 93306 TTE W/DOPPLER COMPLETE: CPT | Performed by: INTERNAL MEDICINE

## 2020-10-15 PROCEDURE — 93306 TTE W/DOPPLER COMPLETE: CPT

## 2020-10-16 RX ORDER — EMPAGLIFLOZIN 25 MG/1
TABLET, FILM COATED ORAL
Qty: 30 TABLET | Refills: 0 | Status: SHIPPED | OUTPATIENT
Start: 2020-10-16 | End: 2020-11-05 | Stop reason: SDUPTHER

## 2020-10-19 ENCOUNTER — TELEPHONE (OUTPATIENT)
Dept: FAMILY MEDICINE CLINIC | Facility: CLINIC | Age: 60
End: 2020-10-19

## 2020-10-20 LAB
BH CV ECHO MEAS - % IVS THICK: 33.7 %
BH CV ECHO MEAS - % LVPW THICK: 88.7 %
BH CV ECHO MEAS - ACS: 2.3 CM
BH CV ECHO MEAS - AO MAX PG (FULL): 0.22 MMHG
BH CV ECHO MEAS - AO MAX PG: 4.4 MMHG
BH CV ECHO MEAS - AO MEAN PG (FULL): 0.41 MMHG
BH CV ECHO MEAS - AO MEAN PG: 2.4 MMHG
BH CV ECHO MEAS - AO ROOT AREA (BSA CORRECTED): 1.7
BH CV ECHO MEAS - AO ROOT AREA: 9.6 CM^2
BH CV ECHO MEAS - AO ROOT DIAM: 3.5 CM
BH CV ECHO MEAS - AO V2 MAX: 104.5 CM/SEC
BH CV ECHO MEAS - AO V2 MEAN: 73.7 CM/SEC
BH CV ECHO MEAS - AO V2 VTI: 21.2 CM
BH CV ECHO MEAS - AVA(I,A): 4.4 CM^2
BH CV ECHO MEAS - AVA(I,D): 4.4 CM^2
BH CV ECHO MEAS - AVA(V,A): 4.1 CM^2
BH CV ECHO MEAS - AVA(V,D): 4.1 CM^2
BH CV ECHO MEAS - BSA(HAYCOCK): 2.2 M^2
BH CV ECHO MEAS - BSA: 2.1 M^2
BH CV ECHO MEAS - BZI_BMI: 34.7 KILOGRAMS/M^2
BH CV ECHO MEAS - BZI_METRIC_HEIGHT: 167.6 CM
BH CV ECHO MEAS - BZI_METRIC_WEIGHT: 97.5 KG
BH CV ECHO MEAS - EDV(CUBED): 137.1 ML
BH CV ECHO MEAS - EDV(MOD-SP4): 103.8 ML
BH CV ECHO MEAS - EDV(TEICH): 127 ML
BH CV ECHO MEAS - EF(CUBED): 66.7 %
BH CV ECHO MEAS - EF(MOD-BP): 58 %
BH CV ECHO MEAS - EF(MOD-SP4): 64.9 %
BH CV ECHO MEAS - EF(TEICH): 57.9 %
BH CV ECHO MEAS - ESV(CUBED): 45.6 ML
BH CV ECHO MEAS - ESV(MOD-SP4): 36.5 ML
BH CV ECHO MEAS - ESV(TEICH): 53.5 ML
BH CV ECHO MEAS - FS: 30.7 %
BH CV ECHO MEAS - IVS/LVPW: 1.3
BH CV ECHO MEAS - IVSD: 1.2 CM
BH CV ECHO MEAS - IVSS: 1.6 CM
BH CV ECHO MEAS - LA DIMENSION(2D): 4.3 CM
BH CV ECHO MEAS - LV DIASTOLIC VOL/BSA (35-75): 50.3 ML/M^2
BH CV ECHO MEAS - LV MASS(C)D: 206.9 GRAMS
BH CV ECHO MEAS - LV MASS(C)DI: 100.3 GRAMS/M^2
BH CV ECHO MEAS - LV MASS(C)S: 238.4 GRAMS
BH CV ECHO MEAS - LV MASS(C)SI: 115.6 GRAMS/M^2
BH CV ECHO MEAS - LV MAX PG: 4.2 MMHG
BH CV ECHO MEAS - LV MEAN PG: 2 MMHG
BH CV ECHO MEAS - LV SYSTOLIC VOL/BSA (12-30): 17.7 ML/M^2
BH CV ECHO MEAS - LV V1 MAX: 101.9 CM/SEC
BH CV ECHO MEAS - LV V1 MEAN: 65.2 CM/SEC
BH CV ECHO MEAS - LV V1 VTI: 22.1 CM
BH CV ECHO MEAS - LVIDD: 5.2 CM
BH CV ECHO MEAS - LVIDS: 3.6 CM
BH CV ECHO MEAS - LVOT AREA: 4.2 CM^2
BH CV ECHO MEAS - LVOT DIAM: 2.3 CM
BH CV ECHO MEAS - LVPWD: 0.93 CM
BH CV ECHO MEAS - LVPWS: 1.8 CM
BH CV ECHO MEAS - MV A MAX VEL: 79.4 CM/SEC
BH CV ECHO MEAS - MV DEC SLOPE: 410.3 CM/SEC^2
BH CV ECHO MEAS - MV DEC TIME: 0.21 SEC
BH CV ECHO MEAS - MV E MAX VEL: 85.3 CM/SEC
BH CV ECHO MEAS - MV E/A: 1.1
BH CV ECHO MEAS - MV MAX PG: 3.2 MMHG
BH CV ECHO MEAS - MV MEAN PG: 1.4 MMHG
BH CV ECHO MEAS - MV V2 MAX: 89.2 CM/SEC
BH CV ECHO MEAS - MV V2 MEAN: 55.7 CM/SEC
BH CV ECHO MEAS - MV V2 VTI: 23.9 CM
BH CV ECHO MEAS - MVA(VTI): 3.9 CM^2
BH CV ECHO MEAS - PA ACC TIME: 0.13 SEC
BH CV ECHO MEAS - PA MAX PG (FULL): 4.1 MMHG
BH CV ECHO MEAS - PA MAX PG: 6.7 MMHG
BH CV ECHO MEAS - PA MEAN PG (FULL): 2.2 MMHG
BH CV ECHO MEAS - PA MEAN PG: 3.7 MMHG
BH CV ECHO MEAS - PA PR(ACCEL): 19.8 MMHG
BH CV ECHO MEAS - PA V2 MAX: 129.3 CM/SEC
BH CV ECHO MEAS - PA V2 MEAN: 92.3 CM/SEC
BH CV ECHO MEAS - PA V2 VTI: 26.2 CM
BH CV ECHO MEAS - RV MAX PG: 2.5 MMHG
BH CV ECHO MEAS - RV MEAN PG: 1.5 MMHG
BH CV ECHO MEAS - RV V1 MAX: 79.7 CM/SEC
BH CV ECHO MEAS - RV V1 MEAN: 58.1 CM/SEC
BH CV ECHO MEAS - RV V1 VTI: 18.1 CM
BH CV ECHO MEAS - RVDD: 3.4 CM
BH CV ECHO MEAS - SI(AO): 98.9 ML/M^2
BH CV ECHO MEAS - SI(CUBED): 44.3 ML/M^2
BH CV ECHO MEAS - SI(LVOT): 44.9 ML/M^2
BH CV ECHO MEAS - SI(MOD-SP4): 32.6 ML/M^2
BH CV ECHO MEAS - SI(TEICH): 35.6 ML/M^2
BH CV ECHO MEAS - SV(AO): 203.9 ML
BH CV ECHO MEAS - SV(CUBED): 91.4 ML
BH CV ECHO MEAS - SV(LVOT): 92.6 ML
BH CV ECHO MEAS - SV(MOD-SP4): 67.3 ML
BH CV ECHO MEAS - SV(TEICH): 73.5 ML
MAXIMAL PREDICTED HEART RATE: 161 BPM
STRESS TARGET HR: 137 BPM

## 2020-10-20 NOTE — PROGRESS NOTES
Echo report finally back thanks to Ivania and looks like Mitral valve is leaking slightly and may be cause of murmur.  Pumping factor looks good-murmur doesn't look significant

## 2020-10-28 ENCOUNTER — OFFICE VISIT (OUTPATIENT)
Dept: CARDIOLOGY | Facility: CLINIC | Age: 60
End: 2020-10-28

## 2020-10-28 VITALS
DIASTOLIC BLOOD PRESSURE: 84 MMHG | BODY MASS INDEX: 34.23 KG/M2 | HEIGHT: 66 IN | WEIGHT: 213 LBS | HEART RATE: 85 BPM | SYSTOLIC BLOOD PRESSURE: 160 MMHG

## 2020-10-28 DIAGNOSIS — E78.2 MIXED HYPERLIPIDEMIA: ICD-10-CM

## 2020-10-28 DIAGNOSIS — I10 ESSENTIAL HYPERTENSION: ICD-10-CM

## 2020-10-28 DIAGNOSIS — I25.10 CORONARY ARTERY DISEASE INVOLVING NATIVE CORONARY ARTERY OF NATIVE HEART WITHOUT ANGINA PECTORIS: Primary | ICD-10-CM

## 2020-10-28 DIAGNOSIS — R01.1 HEART MURMUR: ICD-10-CM

## 2020-10-28 DIAGNOSIS — R00.0 TACHYCARDIA: ICD-10-CM

## 2020-10-28 PROCEDURE — 99214 OFFICE O/P EST MOD 30 MIN: CPT | Performed by: INTERNAL MEDICINE

## 2020-10-28 NOTE — PROGRESS NOTES
Date of Office Visit: 10/28/2020  Encounter Provider: Dr. Prashanth Parikh  Place of Service: [facility] CARDIOLOGY [place]  Patient Name: Dawson Lomax  :1960  Kavitha Teran MD    Chief Complaint   Patient presents with   • Coronary Artery Disease     6 week follow up echo/stress test   • Hypertension   • Hyperlipidemia   • Rapid Heart Rate   • Heart Murmur     History of Present Illness    I am pleased to see  in my office today as a follow-up.     As you know, patient is 59-year-old white gentleman whose past medical history is significant for hypertension, diabetes mellitus, hyperlipidemia, who came today for follow-up     In , patient was admitted with the symptom of chest pain.  patient underwent cardiac catheterization which showed nonobstructive CAD.  In 2017, patient underwent stress test again with chest pain.  Patient underwent stress test which showed possible inferior wall ischemia.  Repeat cardiac catheterization showed nonobstructive CAD.  Medical opinion treatment was recommended.    In 2019, patient was presented to me for symptom of left-sided chest pain.  At that time, patient also had CAT scan of chest which showed calcification of coronary arteries.  I recommended to proceed with stress test but patient did not have stress test.    In 2020, patient underwent stress test which was negative for ischemia or myocardial infarction.  Echocardiogram showed normal left ventricle size and function with EF of 55 to 60%.  Mild mitral regurgitation was noted.    This is unscheduled visit.  Patient underwent cervical spine surgery and did well.  Patient complain of shortness of breath and echocardiogram was repeated which showed mild mitral regurgitation and patient was referred to me for further discussion.    Patient overall is stable from cardiac standpoint.  Patient has mild shortness of breath.  Patient denies any orthopnea, PND, syncope or  presyncope.  No leg edema noted.    I discussed with him that his echocardiogram is okay.  Patient has mild mitral regurgitation which is incidental finding and no clinical consequences at present.  I would recommend repeat echocardiogram in 2 to 3 years.  His blood pressure is slightly elevated but he believes that his blood pressure at home has been stable.  I will continue current treatment.  Patient is advised to monitor the blood pressure and bring the logbook on next visit.        Past Medical History:   Diagnosis Date   • B12 deficiency 2/2/2015   • Broken finger     broken middle finger   • Coronary artery disease    • Gout    • Heart attack (CMS/HCC) 2017   • Hx of migraines    • Hyperlipidemia    • Hypertension    • Plantar fasciitis, left 1/9/2017         Past Surgical History:   Procedure Laterality Date   • ANKLE SURGERY Left 2001   • CARDIAC CATHETERIZATION  2017   • CARPAL TUNNEL RELEASE Bilateral    • CATARACT EXTRACTION  2015   • CERVICAL SPINE SURGERY     • HERNIA REPAIR     • OTHER SURGICAL HISTORY  2017    MI with Stent 2010, 2017    • SPLENECTOMY             Current Outpatient Medications:   •  aspirin 81 MG EC tablet, Take 81 mg by mouth Daily., Disp: , Rfl:   •  atorvastatin (LIPITOR) 20 MG tablet, Take 1 tablet by mouth Daily., Disp: 30 tablet, Rfl: 5  •  cyclobenzaprine (FLEXERIL) 10 MG tablet, Take 10 mg by mouth 2 (two) times a day., Disp: , Rfl:   •  dilTIAZem (TIAZAC) 300 MG 24 hr capsule, Take 300 mg by mouth Daily., Disp: , Rfl:   •  fenofibrate 160 MG tablet, 1 tablet p.o. daily, Disp: 30 tablet, Rfl: 6  •  glyburide (DIAbeta) 5 MG tablet, Take 2 tablets by mouth 2 (Two) Times a Day., Disp: 120 tablet, Rfl: 3  •  hydroCHLOROthiazide (MICROZIDE) 12.5 MG capsule, Take 1 capsule by mouth once daily, Disp: 90 capsule, Rfl: 2  •  ipratropium-albuterol (DUO-NEB) 0.5-2.5 mg/3 ml nebulizer, Take 3 mL by nebulization Every 4 (Four) Hours As Needed for Wheezing., Disp: 360 mL, Rfl: 3  •   Jardiance 25 MG tablet, Take 1 tablet by mouth once daily, Disp: 30 tablet, Rfl: 0  •  lidocaine (XYLOCAINE) 5 % ointment, USE OINTMENT EXTERNALLY THREE TIMES A DAY FOR 30 DAYS AS NEEDED, Disp: , Rfl: 0  •  losartan (COZAAR) 100 MG tablet, Take 1 tablet by mouth Daily., Disp: 90 tablet, Rfl: 3  •  LYRICA 100 MG capsule, Take 100 mg by mouth 2 (Two) Times a Day As Needed (patient states only takes as needed)., Disp: , Rfl: 0  •  metFORMIN ER (GLUCOPHAGE-XR) 500 MG 24 hr tablet, Take 1,000 mg by mouth 2 (Two) Times a Day., Disp: , Rfl:   •  metoprolol tartrate (LOPRESSOR) 25 MG tablet, Take 25 mg by mouth 2 (Two) Times a Day., Disp: , Rfl:   •  oxyCODONE (ROXICODONE) 10 MG tablet, Take 10 mg by mouth Every 6 (Six) Hours As Needed., Disp: , Rfl:   •  Ozempic, 1 MG/DOSE, 2 MG/1.5ML solution pen-injector, INJECT 1MG UNDER THE SKIN INTO THE APPROPRIATE AREA AS DIRECTED ONE TIME PER WEEK, Disp: 4 mL, Rfl: 2  No current facility-administered medications for this visit.       Social History     Socioeconomic History   • Marital status:      Spouse name: Not on file   • Number of children: Not on file   • Years of education: Not on file   • Highest education level: Not on file   Tobacco Use   • Smoking status: Former Smoker     Packs/day: 0.50     Types: Cigarettes     Quit date:      Years since quittin.8   • Smokeless tobacco: Never Used   Substance and Sexual Activity   • Alcohol use: No     Frequency: Never   • Drug use: No   • Sexual activity: Defer         Review of Systems   Constitution: Negative for chills and fever.   HENT: Negative for ear discharge and nosebleeds.    Eyes: Negative for discharge and redness.   Cardiovascular: Negative for chest pain, orthopnea, palpitations, paroxysmal nocturnal dyspnea and syncope.   Respiratory: Positive for shortness of breath. Negative for cough and wheezing.    Endocrine: Negative for heat intolerance.   Skin: Negative for rash.   Musculoskeletal: Positive for  "arthritis, joint pain and neck pain. Negative for myalgias.   Gastrointestinal: Negative for abdominal pain, melena, nausea and vomiting.   Genitourinary: Negative for dysuria and hematuria.   Neurological: Negative for dizziness, light-headedness, numbness and tremors.   Psychiatric/Behavioral: Negative for depression. The patient is not nervous/anxious.        Procedures    Procedures    No orders to display           Objective:    /84   Pulse 85   Ht 167.6 cm (65.98\")   Wt 96.6 kg (213 lb)   BMI 34.40 kg/m²         Constitutional:       Appearance: Well-developed.   Eyes:      General: No scleral icterus.        Right eye: No discharge.   HENT:      Head: Normocephalic and atraumatic.   Neck:      Thyroid: No thyromegaly.      Lymphadenopathy: No cervical adenopathy.   Pulmonary:      Effort: Pulmonary effort is normal. No respiratory distress.      Breath sounds: Normal breath sounds. No wheezing. No rales.   Cardiovascular:      Normal rate. Regular rhythm.      No gallop.   Abdominal:      Tenderness: There is no abdominal tenderness.   Skin:     Findings: No erythema or rash.   Neurological:      Mental Status: Alert and oriented to person, place, and time.             Assessment:       Diagnosis Plan   1. Coronary artery disease involving native coronary artery of native heart without angina pectoris     2. Essential hypertension     3. Mixed hyperlipidemia     4. Heart murmur     5. Tachycardia              Plan:       At this stage, I would recommend that patient should proceed with current treatment.  His echocardiogram showed mild mitral regurgitation.  At this stage I will recommend observation.  His blood pressure is slightly elevated but previously has been stable.  Recommend observation and patient is advised to call me I will see the patient in 6 months  "

## 2020-10-29 ENCOUNTER — LAB (OUTPATIENT)
Dept: LAB | Facility: HOSPITAL | Age: 60
End: 2020-10-29

## 2020-10-29 DIAGNOSIS — I10 ESSENTIAL HYPERTENSION: ICD-10-CM

## 2020-10-29 DIAGNOSIS — E11.65 TYPE 2 DIABETES MELLITUS WITH HYPERGLYCEMIA, WITHOUT LONG-TERM CURRENT USE OF INSULIN (HCC): ICD-10-CM

## 2020-10-29 DIAGNOSIS — N52.9 IMPOTENCE: ICD-10-CM

## 2020-10-29 DIAGNOSIS — E78.2 MIXED HYPERLIPIDEMIA: ICD-10-CM

## 2020-10-29 LAB
ALBUMIN SERPL-MCNC: 4.5 G/DL (ref 3.5–5.2)
ALBUMIN UR-MCNC: 3 MG/DL
ALBUMIN/GLOB SERPL: 1.4 G/DL
ALP SERPL-CCNC: 74 U/L (ref 39–117)
ALT SERPL W P-5'-P-CCNC: 26 U/L (ref 1–41)
ANION GAP SERPL CALCULATED.3IONS-SCNC: 11 MMOL/L (ref 5–15)
AST SERPL-CCNC: 20 U/L (ref 1–40)
BILIRUB SERPL-MCNC: 0.5 MG/DL (ref 0–1.2)
BUN SERPL-MCNC: 15 MG/DL (ref 6–20)
BUN/CREAT SERPL: 18.1 (ref 7–25)
CALCIUM SPEC-SCNC: 9.7 MG/DL (ref 8.6–10.5)
CHLORIDE SERPL-SCNC: 102 MMOL/L (ref 98–107)
CHOLEST SERPL-MCNC: 94 MG/DL (ref 0–200)
CO2 SERPL-SCNC: 24 MMOL/L (ref 22–29)
CREAT SERPL-MCNC: 0.83 MG/DL (ref 0.76–1.27)
CREAT UR-MCNC: 52 MG/DL
GFR SERPL CREATININE-BSD FRML MDRD: 95 ML/MIN/1.73
GLOBULIN UR ELPH-MCNC: 3.2 GM/DL
GLUCOSE SERPL-MCNC: 135 MG/DL (ref 65–99)
HBA1C MFR BLD: 8 % (ref 3.5–5.6)
HDLC SERPL-MCNC: 31 MG/DL (ref 40–60)
LDLC SERPL CALC-MCNC: 42 MG/DL (ref 0–100)
LDLC/HDLC SERPL: 1.32 {RATIO}
MICROALBUMIN/CREAT UR: 57.7 MG/G
POTASSIUM SERPL-SCNC: 3.8 MMOL/L (ref 3.5–5.2)
PROT SERPL-MCNC: 7.7 G/DL (ref 6–8.5)
SODIUM SERPL-SCNC: 137 MMOL/L (ref 136–145)
TRIGL SERPL-MCNC: 110 MG/DL (ref 0–150)
VLDLC SERPL-MCNC: 21 MG/DL (ref 5–40)

## 2020-10-29 PROCEDURE — 84402 ASSAY OF FREE TESTOSTERONE: CPT

## 2020-10-29 PROCEDURE — 82043 UR ALBUMIN QUANTITATIVE: CPT

## 2020-10-29 PROCEDURE — 82570 ASSAY OF URINE CREATININE: CPT

## 2020-10-29 PROCEDURE — 83036 HEMOGLOBIN GLYCOSYLATED A1C: CPT

## 2020-10-29 PROCEDURE — 36415 COLL VENOUS BLD VENIPUNCTURE: CPT

## 2020-10-29 PROCEDURE — 80061 LIPID PANEL: CPT

## 2020-10-29 PROCEDURE — 80053 COMPREHEN METABOLIC PANEL: CPT

## 2020-10-29 PROCEDURE — 84403 ASSAY OF TOTAL TESTOSTERONE: CPT

## 2020-10-29 PROCEDURE — 84270 ASSAY OF SEX HORMONE GLOBUL: CPT

## 2020-10-31 LAB — SHBG SERPL-SCNC: 36.3 NMOL/L (ref 19.3–76.4)

## 2020-11-01 LAB
TESTOST FREE SERPL-MCNC: 14.4 PG/ML (ref 7.2–24)
TESTOST SERPL-MCNC: 296 NG/DL (ref 264–916)

## 2020-11-05 ENCOUNTER — OFFICE VISIT (OUTPATIENT)
Dept: ENDOCRINOLOGY | Facility: CLINIC | Age: 60
End: 2020-11-05

## 2020-11-05 VITALS
TEMPERATURE: 97.2 F | HEART RATE: 73 BPM | WEIGHT: 215 LBS | HEIGHT: 66 IN | BODY MASS INDEX: 34.55 KG/M2 | SYSTOLIC BLOOD PRESSURE: 142 MMHG | OXYGEN SATURATION: 98 % | DIASTOLIC BLOOD PRESSURE: 68 MMHG

## 2020-11-05 DIAGNOSIS — I10 ESSENTIAL HYPERTENSION: ICD-10-CM

## 2020-11-05 DIAGNOSIS — E78.2 MIXED HYPERLIPIDEMIA: ICD-10-CM

## 2020-11-05 DIAGNOSIS — N52.9 IMPOTENCE: ICD-10-CM

## 2020-11-05 DIAGNOSIS — E11.65 TYPE 2 DIABETES MELLITUS WITH HYPERGLYCEMIA, WITHOUT LONG-TERM CURRENT USE OF INSULIN (HCC): Primary | ICD-10-CM

## 2020-11-05 LAB — GLUCOSE BLDC GLUCOMTR-MCNC: 116 MG/DL (ref 70–105)

## 2020-11-05 PROCEDURE — 82962 GLUCOSE BLOOD TEST: CPT | Performed by: INTERNAL MEDICINE

## 2020-11-05 PROCEDURE — 99214 OFFICE O/P EST MOD 30 MIN: CPT | Performed by: INTERNAL MEDICINE

## 2020-11-05 RX ORDER — TADALAFIL 5 MG/1
5 TABLET ORAL DAILY PRN
Qty: 90 TABLET | Refills: 4 | Status: SHIPPED | OUTPATIENT
Start: 2020-11-05 | End: 2022-04-14 | Stop reason: SDUPTHER

## 2020-11-05 RX ORDER — EMPAGLIFLOZIN 25 MG/1
1 TABLET, FILM COATED ORAL DAILY
Qty: 30 TABLET | Refills: 6 | Status: SHIPPED | OUTPATIENT
Start: 2020-11-05 | End: 2021-06-21

## 2020-11-05 RX ORDER — METFORMIN HYDROCHLORIDE 500 MG/1
1000 TABLET, EXTENDED RELEASE ORAL 2 TIMES DAILY
Qty: 180 TABLET | Refills: 4 | Status: SHIPPED | OUTPATIENT
Start: 2020-11-05 | End: 2022-02-04

## 2020-11-05 RX ORDER — SEMAGLUTIDE 1.34 MG/ML
1 INJECTION, SOLUTION SUBCUTANEOUS WEEKLY
Qty: 4 ML | Refills: 6 | Status: SHIPPED | OUTPATIENT
Start: 2020-11-05 | End: 2021-07-06

## 2020-11-05 NOTE — PATIENT INSTRUCTIONS
Start Cialis 5 mg po daily  Please continue to work on your diet and activity  Annual eye exam  Labs before f/u

## 2020-11-05 NOTE — PROGRESS NOTES
Endocrine Progress Note Outpatient     Patient Care Team:  Kavitha Teran MD as PCP - General  Kavitha Teran MD as PCP - Family Medicine    Chief Complaint: Follow-up type 2 diabetes    HPI: 59-year-old male with history of type 2 diabetes, hypertension, hyperlipidemia and obesity is here for follow-up.    For type 2 diabetes he is currently on metformin 1000 g twice a day, Jardiance 25 mill grams once a day, glyburide 10 mill grams twice a day, Ozempic 1.0 mg subcu once a week. No BS records. He is trying to work on his diet and activity.      Hypertension: Well-controlled.    Hyperlipidemia: He is currently on atorvastatin and fenofibrate.    Obesity: He is trying to work on diet.     Impotence: Testosterone level normal.     Past Medical History:   Diagnosis Date   • B12 deficiency 2015   • Broken finger     broken middle finger   • Coronary artery disease    • Gout    • Heart attack (CMS/[facility])    • Hx of migraines    • Hyperlipidemia    • Hypertension    • Plantar fasciitis, left 2017       Social History     Socioeconomic History   • Marital status:      Spouse name: Not on file   • Number of children: Not on file   • Years of education: Not on file   • Highest education level: Not on file   Tobacco Use   • Smoking status: Former Smoker     Packs/day: 0.50     Types: Cigarettes     Quit date:      Years since quittin.8   • Smokeless tobacco: Never Used   Substance and Sexual Activity   • Alcohol use: No     Frequency: Never   • Drug use: No   • Sexual activity: Defer       Family History   Problem Relation Age of Onset   • Diabetes Paternal Grandmother    • Cancer Other        Allergies   Allergen Reactions   • Gabapentin Dizziness       ROS:   Constitutional:  Denies fatigue, tiredness.    Eyes:  Denies change in visual acuity   HENT:  Denies nasal congestion or sore throat   Respiratory: denies cough, shortness of breath.   Cardiovascular:  denies chest pain, edema    GI:  Denies abdominal pain, nausea, vomiting.   Musculoskeletal:  Denies back pain or joint pain   Integument:  Denies dry skin and rash   Neurologic:  Denies headache, focal weakness or sensory changes   Endocrine:  Denies polyuria or polydipsia   Psychiatric:  Denies depression or anxiety      Vitals:    11/05/20 0852   BP: 142/68   Pulse: 73   Temp: 97.2 °F (36.2 °C)   SpO2: 98%       Physical Exam:  GEN: NAD, conversant, obese  EYES: EOMI, PERRL, no conjunctival erythema  NECK: no thyromegaly, full ROM   CV: RRR, no murmurs/rubs/gallops, no peripheral edema  LUNG: CTAB, no wheezes/rales/ronchi  SKIN: no rashes, no acanthosis  MSK: no deformities, full ROM of all extremities  NEURO: no tremors, DTR normal  PSYCH: AOX3, appropriate mood, affect normal      Results Review:     I reviewed the patient's new clinical results.    Lab Results   Component Value Date    HGBA1C 8.0 (H) 10/29/2020    HGBA1C 7.9 (H) 09/15/2020    HGBA1C 7.4 (H) 07/06/2020      Lab Results   Component Value Date    GLUCOSE 135 (H) 10/29/2020    BUN 15 10/29/2020    CREATININE 0.83 10/29/2020    EGFRIFNONA 95 10/29/2020    BCR 18.1 10/29/2020    K 3.8 10/29/2020    CO2 24.0 10/29/2020    CALCIUM 9.7 10/29/2020    ALBUMIN 4.50 10/29/2020    LABIL2 1.3 08/24/2020    AST 20 10/29/2020    ALT 26 10/29/2020    CHOL 94 10/29/2020    TRIG 110 10/29/2020    LDL 42 10/29/2020    HDL 31 (L) 10/29/2020     Lab Results   Component Value Date    TSH 1.770 10/08/2019         Medication Review: Reviewed.       Current Outpatient Medications:   •  aspirin 81 MG EC tablet, Take 81 mg by mouth Daily., Disp: , Rfl:   •  atorvastatin (LIPITOR) 20 MG tablet, Take 1 tablet by mouth Daily., Disp: 30 tablet, Rfl: 5  •  cyclobenzaprine (FLEXERIL) 10 MG tablet, Take 10 mg by mouth 2 (two) times a day., Disp: , Rfl:   •  dilTIAZem (TIAZAC) 300 MG 24 hr capsule, Take 300 mg by mouth Daily., Disp: , Rfl:   •  fenofibrate 160 MG tablet, 1 tablet p.o. daily (Patient  taking differently: 145 mg. 1 tablet p.o. daily), Disp: 30 tablet, Rfl: 6  •  glyburide (DIAbeta) 5 MG tablet, Take 2 tablets by mouth 2 (Two) Times a Day., Disp: 120 tablet, Rfl: 3  •  hydroCHLOROthiazide (MICROZIDE) 12.5 MG capsule, Take 1 capsule by mouth once daily, Disp: 90 capsule, Rfl: 2  •  ipratropium-albuterol (DUO-NEB) 0.5-2.5 mg/3 ml nebulizer, Take 3 mL by nebulization Every 4 (Four) Hours As Needed for Wheezing., Disp: 360 mL, Rfl: 3  •  Jardiance 25 MG tablet, Take 1 tablet by mouth once daily, Disp: 30 tablet, Rfl: 0  •  lidocaine (XYLOCAINE) 5 % ointment, USE OINTMENT EXTERNALLY THREE TIMES A DAY FOR 30 DAYS AS NEEDED, Disp: , Rfl: 0  •  losartan (COZAAR) 100 MG tablet, Take 1 tablet by mouth Daily., Disp: 90 tablet, Rfl: 3  •  LYRICA 100 MG capsule, Take 100 mg by mouth 2 (Two) Times a Day As Needed (patient states only takes as needed)., Disp: , Rfl: 0  •  metFORMIN ER (GLUCOPHAGE-XR) 500 MG 24 hr tablet, Take 1,000 mg by mouth 2 (Two) Times a Day., Disp: , Rfl:   •  oxyCODONE (ROXICODONE) 10 MG tablet, Take 10 mg by mouth Every 6 (Six) Hours As Needed., Disp: , Rfl:   •  Ozempic, 1 MG/DOSE, 2 MG/1.5ML solution pen-injector, INJECT 1MG UNDER THE SKIN INTO THE APPROPRIATE AREA AS DIRECTED ONE TIME PER WEEK, Disp: 4 mL, Rfl: 2  •  metoprolol tartrate (LOPRESSOR) 25 MG tablet, Take 25 mg by mouth 2 (Two) Times a Day., Disp: , Rfl:       Assessment/Plan   1.  Diabetes mellitus type 2: Uncontrolled with A1c of 8%.  Unfortunately I do not have any blood sugar records.  He does not want to go on insulin otherwise he will lose his job.  He will work on his diet and activity has been drinking some diet soda that he is going to stop.  Will follow blood sugars and A1c.  Advised to get annual eye exam and flu vaccine.  He tells me due to lack of explain he cannot take life flu vaccine.  He will look into getting a date flu vaccine.    2.  Hypertension: Well-controlled, continue current medication    3.   Hyperlipidemia: Much better with LDL 42 and triglycerides 110.  We will continue atorvastatin and fenofibrate.    4.  Obesity: Advised to continue to work on diet and activity.    5.  Impotence: Testosterone level was normal.  Will try Cialis 5 mg p.o. daily.          Miki Man MD FACE.

## 2020-11-06 NOTE — PROGRESS NOTES
Hematology/Oncology Outpatient Follow Up    PATIENT NAME:Dawson Lomax  :1960  MRN: [id number]  PRIMARY CARE PHYSICIAN: Kavitha Teran MD  REFERRING PHYSICIAN: Kavitha Teran MD    Chief Complaint   Patient presents with   • Follow-up     leucocytosis          HISTORY OF PRESENT ILLNESS:   This is a 59-year-old male who is here due to two to three month history of night sweats.  Patient denies any fevers or chills.  He has chest congestion for which he had a chest x-ray which was essentially clear.  He denies any other symptoms such as weight loss or fatigue symptoms.  He still has a cough which is productive of clear sputum.  He has no urinary symptoms.  He denies nausea, vomiting or diarrhea.  During his work up for the above complaint he had a CBC done on 3/27/15 which showed a white count of 14.7, hemoglobin 14.4, platelet count of 421,000.  His differentials were 48% neutrophils, 37% lymphocytes, 12% monocytes.  He has no basophilia or eosinophilia noted on his differential count.  He had mild monocytosis and also mild absolute lymphocytosis.  He is alone today for this appointment.    • 2015 - CT scan of the abdomen and pelvis which showed moderate stool and post splenectomy.  Otherwise negative.    • 2015 - Chest x-ray, essentially normal with no acute abnormalities identified.    • 4/1/15 - Patient had further testing including BCR-abl, which was negative for mutation.  His flow cytometry was negative for lymphoproliferative disease.  JORY-2 was negative.  Acute viral hepatitis panel was negative.  He had a normal B12 level, sed rate and LDH.  Uric acid was also normal and his urinalysis was negative for any infection.  His repeat CBC on the same day showed WBC count of 12.9, hemoglobin 13.8, platelet count 414,000.   • 6/10/15 - CT scan of the chest without contrast.  This did not reveal any pulmonary mass.  He has increased upper abdominal lymph nodes, but within  normal limits for size.    • 2/2/17 - Patient was admitted to the hospital for acute coronary syndrome.  Patient apparently had a myocardiac infarction.  He had one stent placed during hospitalization.  I saw him in the past for leukocytosis with negative work up.    • 2/28/17 - WBC 15.5, hemoglobin 13.7, platelet count 425,000.  Differentials were 47% neutrophils, 37% lymphocytes.  There was mild monocytosis at 12% [range 2-11].    • Labs since last visit of 3/1/17 - BCR-abl negative.  B12 (N) 541.  LDH (N) 178.  Uric acid (L) 3.8.    • 3/9/17 - Peripheral blood flow cytometry was negative.  JORY-2 was negative for mutation.    • 3/13/17 - Serum PSA 0.60.   • 2/28/18 - CT scan of the chest showed fatty infiltration of the liver.  There was a small 2 to 3 mm pleural based nodule posteriorly and laterally in the right lower lobe, stable from prior CT scan of 2017.   • 10/19/19-CT of the chest showed small subpleural 3 mm right lower lobe nodule.  Stable 4 mm subpleural right lower lobe nodule.  • 7/17/2020 patient had CT scan of the chest with a stable 3 mm lesion and 5 mm pleural-based nodules within the right lower lobe and stable 3 mm subpleural lesion as well.  These are stable since February 2018 consistent with benign.  Steatotic liver  • HPI has been reviewed        Past Medical History:   Diagnosis Date   • B12 deficiency 2/2/2015   • Broken finger     broken middle finger   • Coronary artery disease    • Gout    • Heart attack (CMS/HCC) 2017   • Hx of migraines    • Hyperlipidemia    • Hypertension    • Plantar fasciitis, left 1/9/2017       Past Surgical History:   Procedure Laterality Date   • ANKLE SURGERY Left 2001   • CARDIAC CATHETERIZATION  2017   • CARPAL TUNNEL RELEASE Bilateral    • CATARACT EXTRACTION  2015   • CERVICAL SPINE SURGERY     • HERNIA REPAIR     • OTHER SURGICAL HISTORY  2017    MI with Stent 2010, 2017    • SPLENECTOMY           Current Outpatient Medications:   •  aspirin 81 MG EC  tablet, Take 81 mg by mouth Daily., Disp: , Rfl:   •  atorvastatin (LIPITOR) 20 MG tablet, Take 1 tablet by mouth Daily., Disp: 30 tablet, Rfl: 5  •  cyclobenzaprine (FLEXERIL) 10 MG tablet, Take 10 mg by mouth 2 (two) times a day., Disp: , Rfl:   •  dilTIAZem (TIAZAC) 300 MG 24 hr capsule, Take 300 mg by mouth Daily., Disp: , Rfl:   •  Empagliflozin (Jardiance) 25 MG tablet, Take 25 mg by mouth Daily., Disp: 30 tablet, Rfl: 6  •  fenofibrate 160 MG tablet, 1 tablet p.o. daily (Patient taking differently: 145 mg. 1 tablet p.o. daily), Disp: 30 tablet, Rfl: 6  •  glyburide (DIAbeta) 5 MG tablet, Take 2 tablets by mouth 2 (Two) Times a Day., Disp: 120 tablet, Rfl: 3  •  hydroCHLOROthiazide (MICROZIDE) 12.5 MG capsule, Take 1 capsule by mouth once daily, Disp: 90 capsule, Rfl: 2  •  ipratropium-albuterol (DUO-NEB) 0.5-2.5 mg/3 ml nebulizer, Take 3 mL by nebulization Every 4 (Four) Hours As Needed for Wheezing., Disp: 360 mL, Rfl: 3  •  lidocaine (XYLOCAINE) 5 % ointment, USE OINTMENT EXTERNALLY THREE TIMES A DAY FOR 30 DAYS AS NEEDED, Disp: , Rfl: 0  •  losartan (COZAAR) 100 MG tablet, Take 1 tablet by mouth Daily., Disp: 90 tablet, Rfl: 3  •  LYRICA 100 MG capsule, Take 100 mg by mouth 2 (Two) Times a Day As Needed (patient states only takes as needed)., Disp: , Rfl: 0  •  metFORMIN ER (GLUCOPHAGE-XR) 500 MG 24 hr tablet, Take 2 tablets by mouth 2 (Two) Times a Day., Disp: 180 tablet, Rfl: 4  •  metoprolol tartrate (LOPRESSOR) 25 MG tablet, Take 25 mg by mouth 2 (Two) Times a Day., Disp: , Rfl:   •  oxyCODONE (ROXICODONE) 10 MG tablet, Take 10 mg by mouth Every 6 (Six) Hours As Needed., Disp: , Rfl:   •  Semaglutide, 1 MG/DOSE, (Ozempic, 1 MG/DOSE,) 2 MG/1.5ML solution pen-injector, Inject 1 mg under the skin into the appropriate area as directed 1 (One) Time Per Week., Disp: 4 mL, Rfl: 6  •  tadalafil (Cialis) 5 MG tablet, Take 1 tablet by mouth Daily As Needed for Erectile Dysfunction., Disp: 90 tablet, Rfl:  "4    Allergies   Allergen Reactions   • Gabapentin Dizziness       Family History   Problem Relation Age of Onset   • Diabetes Paternal Grandmother    • Cancer Other        Cancer-related family history includes Cancer in an other family member.    Social History     Tobacco Use   • Smoking status: Former Smoker     Packs/day: 0.50     Types: Cigarettes     Quit date:      Years since quittin.8   • Smokeless tobacco: Never Used   Substance Use Topics   • Alcohol use: No     Frequency: Never   • Drug use: No       I have reviewed and confirmed the accuracy of the patient's history: as entered by the MA/KRUPA/JULIETTE. Sita Yusuf MD 20     SUBJECTIVE:    The patient is here for a follow up appointment.  He is here today for routine follow-up.  He does not have any specific complaints.        REVIEW OF SYSTEMS:  Review of Systems   Constitutional: Negative for chills and fever.   HENT: Negative for ear pain, mouth sores, nosebleeds and sore throat.    Eyes: Negative for photophobia and visual disturbance.   Respiratory: Negative for wheezing and stridor.    Cardiovascular: Negative for chest pain and palpitations.   Gastrointestinal: Negative for abdominal pain, diarrhea, nausea and vomiting.   Endocrine: Negative for cold intolerance and heat intolerance.   Genitourinary: Negative for dysuria and hematuria.   Musculoskeletal: Negative for joint swelling and neck stiffness.   Skin: Negative for color change and rash.   Neurological: Negative for seizures and syncope.   Hematological: Negative for adenopathy.        No obvious bleeding   Psychiatric/Behavioral: Negative for agitation, confusion and hallucinations.     I have reviewed and confirmed the accuracy of the ROS as documented by the MA/KRUPA/JULIETTE Yusuf MD      OBJECTIVE:    Vitals:    20 0929   BP: 129/78   Pulse: 76   Temp: 97.5 °F (36.4 °C)   Weight: 99.5 kg (219 lb 6.4 oz)   Height: 167.6 cm (66\")   PainSc:   5 "   PainLoc: Neck       ECOG    (0) Fully active, able to carry on all predisease performance without restriction    Physical Exam   Constitutional: He is oriented to person, place, and time. No distress.   HENT:   Head: Normocephalic and atraumatic.   Eyes: Conjunctivae are normal. Right eye exhibits no discharge. Left eye exhibits no discharge. No scleral icterus.   Neck: Normal range of motion. Neck supple. No thyromegaly present.   Cardiovascular: Normal rate, regular rhythm and normal heart sounds. Exam reveals no gallop and no friction rub.   Pulmonary/Chest: Effort normal. No stridor. No respiratory distress. He has no wheezes.   Abdominal: Soft. Bowel sounds are normal. He exhibits no mass. There is no abdominal tenderness. There is no rebound and no guarding.   Musculoskeletal: Normal range of motion. No tenderness.   Lymphadenopathy:     He has no cervical adenopathy.   Neurological: He is alert and oriented to person, place, and time. He exhibits normal muscle tone.   Skin: Skin is warm. No rash noted. He is not diaphoretic. No erythema.   Psychiatric: His behavior is normal. Judgment and thought content normal.   Nursing note and vitals reviewed.    RECENT LABS    WBC   Date Value Ref Range Status   11/11/2020 15.36 (H) 3.40 - 10.80 10*3/mm3 Final     RBC   Date Value Ref Range Status   11/11/2020 4.61 4.14 - 5.80 10*6/mm3 Final     Hemoglobin   Date Value Ref Range Status   11/11/2020 14.7 13.0 - 17.7 g/dL Final   09/15/2020 15.2 13.5 - 17.5 g/dL Final     Hematocrit   Date Value Ref Range Status   11/11/2020 44.5 37.5 - 51.0 % Final   09/15/2020 46.2 41.0 - 53.0 % Final     MCV   Date Value Ref Range Status   11/11/2020 96.5 79.0 - 97.0 fL Final     MCH   Date Value Ref Range Status   11/11/2020 31.9 26.6 - 33.0 pg Final     MCHC   Date Value Ref Range Status   11/11/2020 33.0 31.5 - 35.7 g/dL Final     RDW   Date Value Ref Range Status   11/11/2020 14.7 12.3 - 15.4 % Final     RDW-SD   Date Value Ref  Range Status   11/11/2020 49.8 37.0 - 54.0 fl Final     MPV   Date Value Ref Range Status   11/11/2020 8.5 6.0 - 12.0 fL Final     Platelets   Date Value Ref Range Status   11/11/2020 457 (H) 140 - 450 10*3/mm3 Final     Neutrophil %   Date Value Ref Range Status   11/11/2020 49.6 42.7 - 76.0 % Final     Lymphocyte %   Date Value Ref Range Status   11/11/2020 36.0 19.6 - 45.3 % Final     Monocyte %   Date Value Ref Range Status   11/11/2020 12.7 (H) 5.0 - 12.0 % Final     Eosinophil %   Date Value Ref Range Status   11/11/2020 1.2 0.3 - 6.2 % Final     Basophil %   Date Value Ref Range Status   11/11/2020 0.5 0.0 - 1.5 % Final     Immature Grans %   Date Value Ref Range Status   07/09/2020 0.6 (H) 0.0 - 0.5 % Final     Neutrophils, Absolute   Date Value Ref Range Status   11/11/2020 7.62 (H) 1.70 - 7.00 10*3/mm3 Final     Lymphocytes, Absolute   Date Value Ref Range Status   11/11/2020 5.53 (H) 0.70 - 3.10 10*3/mm3 Final     Monocytes, Absolute   Date Value Ref Range Status   11/11/2020 1.95 (H) 0.10 - 0.90 10*3/mm3 Final     Eosinophils, Absolute   Date Value Ref Range Status   11/11/2020 0.19 0.00 - 0.40 10*3/mm3 Final     Basophils, Absolute   Date Value Ref Range Status   11/11/2020 0.07 0.00 - 0.20 10*3/mm3 Final     Immature Grans, Absolute   Date Value Ref Range Status   07/09/2020 0.08 (H) 0.00 - 0.05 10*3/mm3 Final     nRBC   Date Value Ref Range Status   07/09/2020 0.0 0.0 - 0.2 /100 WBC Final       Lab Results   Component Value Date    GLUCOSE 135 (H) 10/29/2020    BUN 15 10/29/2020    CREATININE 0.83 10/29/2020    EGFRIFNONA 95 10/29/2020    BCR 18.1 10/29/2020    K 3.8 10/29/2020    CO2 24.0 10/29/2020    CALCIUM 9.7 10/29/2020    ALBUMIN 4.50 10/29/2020    LABIL2 1.3 08/24/2020    AST 20 10/29/2020    ALT 26 10/29/2020           ASSESSMENT:    1. Leukocytosis likely reactive and also post splenectomy with negative peripheral work up so far.      2. History of right pulmonary nodules. Stable on recent  CT chest done 10/19/19    PLANS:    Patient is due for CT of the chest to follow-up on pulmonary nodules in March 2021.  He will see me shortly after that.     His leukocytosis is stable.  Peripheral work-up in the past has been negative  He will continue to follow up with Dr. Teran, his primary care physician.        I have reviewed labs results, imaging, vitals, and medications with the patient today. Will follow up in 6 months with me .      Patient verbalized understanding and is in agreement of the above plan.

## 2020-11-11 ENCOUNTER — OFFICE VISIT (OUTPATIENT)
Dept: ONCOLOGY | Facility: CLINIC | Age: 60
End: 2020-11-11

## 2020-11-11 ENCOUNTER — LAB (OUTPATIENT)
Dept: LAB | Facility: HOSPITAL | Age: 60
End: 2020-11-11

## 2020-11-11 VITALS
BODY MASS INDEX: 35.26 KG/M2 | TEMPERATURE: 97.5 F | HEIGHT: 66 IN | HEART RATE: 76 BPM | DIASTOLIC BLOOD PRESSURE: 78 MMHG | WEIGHT: 219.4 LBS | SYSTOLIC BLOOD PRESSURE: 129 MMHG

## 2020-11-11 DIAGNOSIS — D72.828 OTHER ELEVATED WHITE BLOOD CELL (WBC) COUNT: Primary | ICD-10-CM

## 2020-11-11 DIAGNOSIS — D72.828 OTHER ELEVATED WHITE BLOOD CELL (WBC) COUNT: ICD-10-CM

## 2020-11-11 DIAGNOSIS — R91.8 PULMONARY NODULES: ICD-10-CM

## 2020-11-11 LAB
BASOPHILS # BLD AUTO: 0.07 10*3/MM3 (ref 0–0.2)
BASOPHILS NFR BLD AUTO: 0.5 % (ref 0–1.5)
DEPRECATED RDW RBC AUTO: 49.8 FL (ref 37–54)
EOSINOPHIL # BLD AUTO: 0.19 10*3/MM3 (ref 0–0.4)
EOSINOPHIL NFR BLD AUTO: 1.2 % (ref 0.3–6.2)
ERYTHROCYTE [DISTWIDTH] IN BLOOD BY AUTOMATED COUNT: 14.7 % (ref 12.3–15.4)
HCT VFR BLD AUTO: 44.5 % (ref 37.5–51)
HGB BLD-MCNC: 14.7 G/DL (ref 13–17.7)
LYMPHOCYTES # BLD AUTO: 5.53 10*3/MM3 (ref 0.7–3.1)
LYMPHOCYTES NFR BLD AUTO: 36 % (ref 19.6–45.3)
MCH RBC QN AUTO: 31.9 PG (ref 26.6–33)
MCHC RBC AUTO-ENTMCNC: 33 G/DL (ref 31.5–35.7)
MCV RBC AUTO: 96.5 FL (ref 79–97)
MONOCYTES # BLD AUTO: 1.95 10*3/MM3 (ref 0.1–0.9)
MONOCYTES NFR BLD AUTO: 12.7 % (ref 5–12)
NEUTROPHILS NFR BLD AUTO: 49.6 % (ref 42.7–76)
NEUTROPHILS NFR BLD AUTO: 7.62 10*3/MM3 (ref 1.7–7)
PLATELET # BLD AUTO: 457 10*3/MM3 (ref 140–450)
PMV BLD AUTO: 8.5 FL (ref 6–12)
RBC # BLD AUTO: 4.61 10*6/MM3 (ref 4.14–5.8)
WBC # BLD AUTO: 15.36 10*3/MM3 (ref 3.4–10.8)

## 2020-11-11 PROCEDURE — 85025 COMPLETE CBC W/AUTO DIFF WBC: CPT

## 2020-11-11 PROCEDURE — 99213 OFFICE O/P EST LOW 20 MIN: CPT | Performed by: INTERNAL MEDICINE

## 2020-11-11 PROCEDURE — 36415 COLL VENOUS BLD VENIPUNCTURE: CPT

## 2020-12-09 RX ORDER — FENOFIBRATE 145 MG/1
TABLET, COATED ORAL
Qty: 90 TABLET | Refills: 3 | Status: SHIPPED | OUTPATIENT
Start: 2020-12-09 | End: 2022-09-20

## 2020-12-14 ENCOUNTER — TELEPHONE (OUTPATIENT)
Dept: FAMILY MEDICINE CLINIC | Facility: CLINIC | Age: 60
End: 2020-12-14

## 2020-12-14 NOTE — TELEPHONE ENCOUNTER
Left patient know that dr dougherty is the one that prescribed the meds he needs to be the one to do the pa. Also sent fax back to pharmacy letting them know that also.

## 2020-12-17 ENCOUNTER — TELEPHONE (OUTPATIENT)
Dept: ENDOCRINOLOGY | Facility: CLINIC | Age: 60
End: 2020-12-17

## 2021-01-07 RX ORDER — HYDROCHLOROTHIAZIDE 12.5 MG/1
CAPSULE, GELATIN COATED ORAL
Qty: 90 CAPSULE | Refills: 3 | Status: SHIPPED | OUTPATIENT
Start: 2021-01-07 | End: 2021-12-06 | Stop reason: SDUPTHER

## 2021-01-21 RX ORDER — LOSARTAN POTASSIUM 100 MG/1
TABLET ORAL
Qty: 90 TABLET | Refills: 0 | Status: SHIPPED | OUTPATIENT
Start: 2021-01-21 | End: 2021-01-25

## 2021-01-25 ENCOUNTER — OFFICE VISIT (OUTPATIENT)
Dept: FAMILY MEDICINE CLINIC | Facility: CLINIC | Age: 61
End: 2021-01-25

## 2021-01-25 ENCOUNTER — TELEPHONE (OUTPATIENT)
Dept: FAMILY MEDICINE CLINIC | Facility: CLINIC | Age: 61
End: 2021-01-25

## 2021-01-25 VITALS
HEART RATE: 78 BPM | WEIGHT: 223.4 LBS | SYSTOLIC BLOOD PRESSURE: 162 MMHG | TEMPERATURE: 98.2 F | HEIGHT: 66 IN | RESPIRATION RATE: 18 BRPM | DIASTOLIC BLOOD PRESSURE: 73 MMHG | BODY MASS INDEX: 35.9 KG/M2 | OXYGEN SATURATION: 94 %

## 2021-01-25 DIAGNOSIS — J06.9 UPPER RESPIRATORY TRACT INFECTION, UNSPECIFIED TYPE: Primary | ICD-10-CM

## 2021-01-25 PROCEDURE — 99213 OFFICE O/P EST LOW 20 MIN: CPT | Performed by: NURSE PRACTITIONER

## 2021-01-25 PROCEDURE — U0003 INFECTIOUS AGENT DETECTION BY NUCLEIC ACID (DNA OR RNA); SEVERE ACUTE RESPIRATORY SYNDROME CORONAVIRUS 2 (SARS-COV-2) (CORONAVIRUS DISEASE [COVID-19]), AMPLIFIED PROBE TECHNIQUE, MAKING USE OF HIGH THROUGHPUT TECHNOLOGIES AS DESCRIBED BY CMS-2020-01-R: HCPCS | Performed by: NURSE PRACTITIONER

## 2021-01-25 RX ORDER — LOSARTAN POTASSIUM 100 MG/1
TABLET ORAL
Qty: 90 TABLET | Refills: 0 | Status: SHIPPED | OUTPATIENT
Start: 2021-01-25 | End: 2021-03-01 | Stop reason: SDUPTHER

## 2021-01-25 RX ORDER — AMOXICILLIN 875 MG/1
875 TABLET, COATED ORAL 2 TIMES DAILY
Qty: 20 TABLET | Refills: 0 | Status: SHIPPED | OUTPATIENT
Start: 2021-01-25 | End: 2021-05-06

## 2021-01-25 RX ORDER — BENZONATATE 100 MG/1
100 CAPSULE ORAL 3 TIMES DAILY PRN
Qty: 30 CAPSULE | Refills: 1 | Status: SHIPPED | OUTPATIENT
Start: 2021-01-25 | End: 2021-05-06

## 2021-01-25 RX ORDER — IBUPROFEN 800 MG/1
800 TABLET ORAL 3 TIMES DAILY PRN
COMMUNITY
Start: 2021-01-06 | End: 2022-06-14

## 2021-01-25 NOTE — TELEPHONE ENCOUNTER
No fever, no sore throat or facial pain. Drain is clear when blows. He does have a cough. Have not been exposed to covid. He made appt to see judah today

## 2021-01-25 NOTE — TELEPHONE ENCOUNTER
Has he been running a fever?  Any sore throat or facial pain?  Color of drainage from nose?  Cough?  Has he been exposed to Covid?  Would advise phone or video visit if doesn't want to come in.

## 2021-01-25 NOTE — TELEPHONE ENCOUNTER
Caller: Dawson Lomax    Relationship to patient: Self    Best call back number: [phone]  Patient is needing: Patient has a head cold and is requesting that and antibiotic be called in for him

## 2021-01-25 NOTE — PROGRESS NOTES
"Subjective   Dawson Lomax is a 60 y.o. male presents for   Chief Complaint   Patient presents with   • Annual Exam   • URI     started on thursday        Health Maintenance Due   Topic Date Due   • COLONOSCOPY  1960   • TDAP/TD VACCINES (1 - Tdap) 12/20/1979   • ZOSTER VACCINE (1 of 2) 12/20/2010   • Pneumococcal Vaccine 0-64 (3 of 3 - PPSV23) 01/01/2018   • MENINGOCOCCAL VACCINE (2 - Risk 2-dose series) 01/11/2018   • DIABETIC EYE EXAM  03/01/2020   • INFLUENZA VACCINE  08/01/2020   • ANNUAL PHYSICAL  10/05/2020   • PROSTATE CANCER SCREENING  10/08/2020       History of Present Illness   Pt reports head congestion and cough x 4 days.  He denies fever or shortness of breath but states he is coughing up clear phlegm.  He had left over antibiotics from last year and took them, but reports no improvement in symptoms.  Pt counseled on appropriate use of antibiotics.  He states he has also been using nasal spray with no significant relief of symptoms.     Vitals:    01/25/21 1459 01/25/21 1504   BP: 123/76 162/73   BP Location: Right arm Left arm   Patient Position: Sitting Sitting   Cuff Size: Large Adult Large Adult   Pulse: 88 78   Resp: 18    Temp: 98.2 °F (36.8 °C)    TempSrc: Infrared    SpO2: 94%    Weight: 101 kg (223 lb 6.4 oz)    Height: 167.6 cm (66\")      Body mass index is 36.06 kg/m².    Current Outpatient Medications on File Prior to Visit   Medication Sig Dispense Refill   • aspirin 81 MG EC tablet Take 81 mg by mouth 3 (Three) Times a Week.     • atorvastatin (LIPITOR) 20 MG tablet Take 1 tablet by mouth Daily. 30 tablet 5   • cyclobenzaprine (FLEXERIL) 10 MG tablet Take 10 mg by mouth 2 (Two) Times a Day As Needed.     • dilTIAZem (TIAZAC) 300 MG 24 hr capsule Take 300 mg by mouth Daily.     • Empagliflozin (Jardiance) 25 MG tablet Take 25 mg by mouth Daily. 30 tablet 6   • glyburide (DIAbeta) 5 MG tablet Take 2 tablets by mouth 2 (Two) Times a Day. 120 tablet 3   • hydroCHLOROthiazide " (MICROZIDE) 12.5 MG capsule Take 1 capsule by mouth once daily 90 capsule 3   • ibuprofen (ADVIL,MOTRIN) 800 MG tablet Take 800 mg by mouth 3 (Three) Times a Day As Needed.     • ipratropium-albuterol (DUO-NEB) 0.5-2.5 mg/3 ml nebulizer Take 3 mL by nebulization Every 4 (Four) Hours As Needed for Wheezing. 360 mL 3   • lidocaine (XYLOCAINE) 5 % ointment USE OINTMENT EXTERNALLY THREE TIMES A DAY FOR 30 DAYS AS NEEDED  0   • losartan (COZAAR) 100 MG tablet Take 1 tablet by mouth once daily 90 tablet 0   • LYRICA 100 MG capsule Take 100 mg by mouth 2 (Two) Times a Day As Needed (patient states only takes as needed).  0   • metFORMIN ER (GLUCOPHAGE-XR) 500 MG 24 hr tablet Take 2 tablets by mouth 2 (Two) Times a Day. 180 tablet 4   • metoprolol tartrate (LOPRESSOR) 25 MG tablet Take 25 mg by mouth 2 (Two) Times a Day.     • oxyCODONE (ROXICODONE) 10 MG tablet Take 10 mg by mouth Every 6 (Six) Hours As Needed.     • Semaglutide, 1 MG/DOSE, (Ozempic, 1 MG/DOSE,) 2 MG/1.5ML solution pen-injector Inject 1 mg under the skin into the appropriate area as directed 1 (One) Time Per Week. 4 mL 6   • tadalafil (Cialis) 5 MG tablet Take 1 tablet by mouth Daily As Needed for Erectile Dysfunction. 90 tablet 4   • fenofibrate (TRICOR) 145 MG tablet Take 1 tablet by mouth once daily 90 tablet 3   • fenofibrate 160 MG tablet 1 tablet p.o. daily (Patient taking differently: 145 mg. 1 tablet p.o. daily) 30 tablet 6   • [DISCONTINUED] losartan (COZAAR) 100 MG tablet Take 1 tablet by mouth once daily 90 tablet 0     No current facility-administered medications on file prior to visit.        The following portions of the patient's history were reviewed and updated as appropriate: allergies, current medications, past family history, past medical history, past social history, past surgical history, and problem list.    Review of Systems   Constitutional: Negative for chills and fever.   HENT: Positive for congestion. Negative for sinus pressure  and sore throat.    Eyes: Negative for blurred vision.   Respiratory: Positive for cough. Negative for shortness of breath.    Cardiovascular: Negative for chest pain.   Gastrointestinal: Negative for abdominal pain.   Endocrine: Negative.    Genitourinary: Negative.    Musculoskeletal: Negative for arthralgias and joint swelling.   Skin: Negative for color change.   Allergic/Immunologic: Negative.    Neurological: Negative for dizziness.   Hematological: Negative.    Psychiatric/Behavioral: Negative for behavioral problems.       Objective   Physical Exam  Vitals signs and nursing note reviewed.   Constitutional:       Appearance: Normal appearance. He is well-developed. He is obese. He is ill-appearing.   HENT:      Head: Normocephalic and atraumatic.      Right Ear: Tympanic membrane, ear canal and external ear normal.      Left Ear: Tympanic membrane, ear canal and external ear normal.      Nose: Nose normal.      Mouth/Throat:      Mouth: Mucous membranes are moist.   Eyes:      Extraocular Movements: Extraocular movements intact.      Conjunctiva/sclera: Conjunctivae normal.      Pupils: Pupils are equal, round, and reactive to light.   Neck:      Musculoskeletal: Normal range of motion and neck supple.   Cardiovascular:      Rate and Rhythm: Normal rate and regular rhythm.      Pulses: Normal pulses.      Heart sounds: Normal heart sounds.   Pulmonary:      Effort: Pulmonary effort is normal.      Breath sounds: Normal breath sounds.   Abdominal:      General: Bowel sounds are normal.      Palpations: Abdomen is soft.   Musculoskeletal: Normal range of motion.   Skin:     General: Skin is warm and dry.   Neurological:      General: No focal deficit present.      Mental Status: He is alert and oriented to person, place, and time.   Psychiatric:         Mood and Affect: Mood normal.         Behavior: Behavior normal.       PHQ-9 Total Score: 0    Assessment/Plan   Diagnoses and all orders for this visit:    1.  "Upper respiratory tract infection, unspecified type (Primary)  Comments:  pt counseled on possibility of covid 19 infection and instructed to quarantine x 10 days from the onset of symptoms.    Orders:  -     amoxicillin (AMOXIL) 875 MG tablet; Take 1 tablet by mouth 2 (Two) Times a Day.  Dispense: 20 tablet; Refill: 0  -     COVID-19,[facility] ROUTINE, NP/OP SWAB IN TRANSPORT MEDIA OR ESWAB 72 HR TAT - Swab, Nasopharynx; Future  -     COVID-19,[facility] ROUTINE, NP/OP SWAB IN TRANSPORT MEDIA OR ESWAB 72 HR TAT - Swab, Nasopharynx    Other orders  -     benzonatate (Tessalon Perles) 100 MG capsule; Take 1 capsule by mouth 3 (Three) Times a Day As Needed for Cough.  Dispense: 30 capsule; Refill: 1      Pt does not feel he has covid 19 and states he has to go to work.    Patient Instructions   Upper Respiratory Infection, Adult  An upper respiratory infection (URI) affects the nose, throat, and upper air passages. URIs are caused by germs (viruses). The most common type of URI is often called \"the common cold.\"  Medicines cannot cure URIs, but you can do things at home to relieve your symptoms. URIs usually get better within 7-10 days.  Follow these instructions at home:  Activity  · Rest as needed.  · If you have a fever, stay home from work or school until your fever is gone, or until your doctor says you may return to work or school.  ? You should stay home until you cannot spread the infection anymore (you are not contagious).  ? Your doctor may have you wear a face mask so you have less risk of spreading the infection.  Relieving symptoms  · Gargle with a salt-water mixture 3-4 times a day or as needed. To make a salt-water mixture, completely dissolve ½-1 tsp of salt in 1 cup of warm water.  · Use a cool-mist humidifier to add moisture to the air. This can help you breathe more easily.  Eating and drinking    · Drink enough fluid to keep your pee (urine) pale yellow.  · Eat soups and other clear broths.  General " "instructions    · Take over-the-counter and prescription medicines only as told by your doctor. These include cold medicines, fever reducers, and cough suppressants.  · Do not use any products that contain nicotine or tobacco. These include cigarettes and e-cigarettes. If you need help quitting, ask your doctor.  · Avoid being where people are smoking (avoid secondhand smoke).  · Make sure you get regular shots and get the flu shot every year.  · Keep all follow-up visits as told by your doctor. This is important.  How to avoid spreading infection to others    · Wash your hands often with soap and water. If you do not have soap and water, use hand .  · Avoid touching your mouth, face, eyes, or nose.  · Cough or sneeze into a tissue or your sleeve or elbow. Do not cough or sneeze into your hand or into the air.  Contact a doctor if:  · You are getting worse, not better.  · You have any of these:  ? A fever.  ? Chills.  ? Brown or red mucus in your nose.  ? Yellow or brown fluid (discharge)coming from your nose.  ? Pain in your face, especially when you bend forward.  ? Swollen neck glands.  ? Pain with swallowing.  ? White areas in the back of your throat.  Get help right away if:  · You have shortness of breath that gets worse.  · You have very bad or constant:  ? Headache.  ? Ear pain.  ? Pain in your forehead, behind your eyes, and over your cheekbones (sinus pain).  ? Chest pain.  · You have long-lasting (chronic) lung disease along with any of these:  ? Wheezing.  ? Long-lasting cough.  ? Coughing up blood.  ? A change in your usual mucus.  · You have a stiff neck.  · You have changes in your:  ? Vision.  ? Hearing.  ? Thinking.  ? Mood.  Summary  · An upper respiratory infection (URI) is caused by a germ called a virus. The most common type of URI is often called \"the common cold.\"  · URIs usually get better within 7-10 days.  · Take over-the-counter and prescription medicines only as told by your " doctor.  This information is not intended to replace advice given to you by your health care provider. Make sure you discuss any questions you have with your health care provider.  Document Revised: 12/26/2019 Document Reviewed: 08/10/2018  Elsevier Patient Education © 2020 [employer]

## 2021-01-26 LAB — SARS-COV-2 RNA RESP QL NAA+PROBE: NOT DETECTED

## 2021-03-01 ENCOUNTER — OFFICE VISIT (OUTPATIENT)
Dept: FAMILY MEDICINE CLINIC | Facility: CLINIC | Age: 61
End: 2021-03-01

## 2021-03-01 VITALS
TEMPERATURE: 97.7 F | BODY MASS INDEX: 35.07 KG/M2 | DIASTOLIC BLOOD PRESSURE: 67 MMHG | SYSTOLIC BLOOD PRESSURE: 127 MMHG | WEIGHT: 218.2 LBS | OXYGEN SATURATION: 95 % | HEIGHT: 66 IN | HEART RATE: 65 BPM

## 2021-03-01 DIAGNOSIS — M10.9 GOUT, UNSPECIFIED CAUSE, UNSPECIFIED CHRONICITY, UNSPECIFIED SITE: Chronic | ICD-10-CM

## 2021-03-01 DIAGNOSIS — I25.10 CORONARY ARTERY DISEASE INVOLVING NATIVE CORONARY ARTERY OF NATIVE HEART WITHOUT ANGINA PECTORIS: ICD-10-CM

## 2021-03-01 DIAGNOSIS — E66.01 MORBID (SEVERE) OBESITY DUE TO EXCESS CALORIES (HCC): ICD-10-CM

## 2021-03-01 DIAGNOSIS — I65.23 CAROTID STENOSIS, ASYMPTOMATIC, BILATERAL: ICD-10-CM

## 2021-03-01 DIAGNOSIS — Z00.01 ENCOUNTER FOR ANNUAL GENERAL MEDICAL EXAMINATION WITH ABNORMAL FINDINGS IN ADULT: Primary | ICD-10-CM

## 2021-03-01 DIAGNOSIS — Z12.11 SCREENING FOR COLON CANCER: ICD-10-CM

## 2021-03-01 DIAGNOSIS — E11.65 TYPE 2 DIABETES MELLITUS WITH HYPERGLYCEMIA, WITHOUT LONG-TERM CURRENT USE OF INSULIN (HCC): ICD-10-CM

## 2021-03-01 DIAGNOSIS — I10 ESSENTIAL HYPERTENSION: ICD-10-CM

## 2021-03-01 DIAGNOSIS — J38.3 LESION OF VOCAL CORD: ICD-10-CM

## 2021-03-01 DIAGNOSIS — E78.2 MIXED HYPERLIPIDEMIA: ICD-10-CM

## 2021-03-01 DIAGNOSIS — Z12.5 SCREENING FOR PROSTATE CANCER: ICD-10-CM

## 2021-03-01 DIAGNOSIS — E66.2 CLASS 2 OBESITY WITH ALVEOLAR HYPOVENTILATION, SERIOUS COMORBIDITY, AND BODY MASS INDEX (BMI) OF 35.0 TO 35.9 IN ADULT (HCC): ICD-10-CM

## 2021-03-01 DIAGNOSIS — E55.9 VITAMIN D DEFICIENCY: ICD-10-CM

## 2021-03-01 PROBLEM — E66.9 OBESITY WITH BODY MASS INDEX 30 OR GREATER: Status: RESOLVED | Noted: 2017-11-15 | Resolved: 2021-03-01

## 2021-03-01 PROBLEM — M47.22 CERVICAL SPONDYLOSIS WITH RADICULOPATHY: Status: ACTIVE | Noted: 2020-08-14

## 2021-03-01 PROCEDURE — 99214 OFFICE O/P EST MOD 30 MIN: CPT | Performed by: PREVENTIVE MEDICINE

## 2021-03-01 PROCEDURE — 99396 PREV VISIT EST AGE 40-64: CPT | Performed by: PREVENTIVE MEDICINE

## 2021-03-01 RX ORDER — LOSARTAN POTASSIUM 100 MG/1
100 TABLET ORAL DAILY
Qty: 90 TABLET | Refills: 3 | Status: SHIPPED | OUTPATIENT
Start: 2021-03-01 | End: 2022-06-14

## 2021-03-01 NOTE — PROGRESS NOTES
"Subjective   Dawson Lomax is a 60 y.o. male presents for   Chief Complaint   Patient presents with   • Diabetes     5 month follow up   • Hypertension   • Hyperlipidemia   • Annual Exam       Health Maintenance Due   Topic Date Due   • COLONOSCOPY  1960   • TDAP/TD VACCINES (1 - Tdap) 12/20/1979   • ZOSTER VACCINE (1 of 2) 12/20/2010   • Pneumococcal Vaccine 0-64 (3 of 3 - PPSV23) 01/01/2018   • MENINGOCOCCAL VACCINE (2 - Risk 2-dose series) 01/11/2018   • DIABETIC EYE EXAM  03/01/2020   • INFLUENZA VACCINE  08/01/2020   • ANNUAL PHYSICAL  10/05/2020   • PROSTATE CANCER SCREENING  10/08/2020       Patient presents for an annual wellness exam and has been told to wear his seatbelt and to use sunscreen.  Patient is also here to check on multiple chronic conditions.  And has not noticed any problems with his diabetes does not mention any increase in dizziness still has a change in his voice and a lesion on his vocal cord.    Diabetes  Pertinent negatives for diabetes include no chest pain.   Hypertension  Associated symptoms include neck pain. Pertinent negatives include no chest pain.   Hyperlipidemia  Pertinent negatives include no chest pain.        Vitals:    03/01/21 0810 03/01/21 0812 03/01/21 0816   BP: 132/72 142/80 127/67   BP Location: Right arm Left arm Right arm   Patient Position: Sitting Sitting Standing   Cuff Size: Adult Adult Adult   Pulse: 65     Temp: 97.7 °F (36.5 °C)     SpO2: 95%     Weight: 99 kg (218 lb 3.2 oz)     Height: 167.6 cm (65.98\")       Body mass index is 35.24 kg/m².    Current Outpatient Medications on File Prior to Visit   Medication Sig Dispense Refill   • aspirin 81 MG EC tablet Take 81 mg by mouth 3 (Three) Times a Week.     • atorvastatin (LIPITOR) 20 MG tablet Take 1 tablet by mouth Daily. 30 tablet 5   • cyclobenzaprine (FLEXERIL) 10 MG tablet Take 10 mg by mouth 2 (Two) Times a Day As Needed.     • dilTIAZem (TIAZAC) 300 MG 24 hr capsule Take 300 mg by mouth Daily.   "   • Empagliflozin (Jardiance) 25 MG tablet Take 25 mg by mouth Daily. 30 tablet 6   • fenofibrate (TRICOR) 145 MG tablet Take 1 tablet by mouth once daily 90 tablet 3   • glyburide (DIAbeta) 5 MG tablet Take 2 tablets by mouth 2 (Two) Times a Day. 120 tablet 3   • hydroCHLOROthiazide (MICROZIDE) 12.5 MG capsule Take 1 capsule by mouth once daily 90 capsule 3   • ibuprofen (ADVIL,MOTRIN) 800 MG tablet Take 800 mg by mouth 3 (Three) Times a Day As Needed.     • ipratropium-albuterol (DUO-NEB) 0.5-2.5 mg/3 ml nebulizer Take 3 mL by nebulization Every 4 (Four) Hours As Needed for Wheezing. 360 mL 3   • lidocaine (XYLOCAINE) 5 % ointment USE OINTMENT EXTERNALLY THREE TIMES A DAY FOR 30 DAYS AS NEEDED  0   • LYRICA 100 MG capsule Take 100 mg by mouth 2 (Two) Times a Day As Needed (patient states only takes as needed).  0   • metFORMIN ER (GLUCOPHAGE-XR) 500 MG 24 hr tablet Take 2 tablets by mouth 2 (Two) Times a Day. 180 tablet 4   • metoprolol tartrate (LOPRESSOR) 25 MG tablet Take 25 mg by mouth 2 (Two) Times a Day.     • oxyCODONE (ROXICODONE) 10 MG tablet Take 10 mg by mouth Every 6 (Six) Hours As Needed.     • Semaglutide, 1 MG/DOSE, (Ozempic, 1 MG/DOSE,) 2 MG/1.5ML solution pen-injector Inject 1 mg under the skin into the appropriate area as directed 1 (One) Time Per Week. 4 mL 6   • tadalafil (Cialis) 5 MG tablet Take 1 tablet by mouth Daily As Needed for Erectile Dysfunction. 90 tablet 4   • amoxicillin (AMOXIL) 875 MG tablet Take 1 tablet by mouth 2 (Two) Times a Day. 20 tablet 0   • benzonatate (Tessalon Perles) 100 MG capsule Take 1 capsule by mouth 3 (Three) Times a Day As Needed for Cough. 30 capsule 1   • fenofibrate 160 MG tablet 1 tablet p.o. daily (Patient taking differently: 145 mg. 1 tablet p.o. daily) 30 tablet 6   • [DISCONTINUED] losartan (COZAAR) 100 MG tablet Take 1 tablet by mouth once daily 90 tablet 0     No current facility-administered medications on file prior to visit.        The following  portions of the patient's history were reviewed and updated as appropriate: allergies, current medications, past family history, past medical history, past social history, past surgical history and problem list.    Review of Systems   Constitutional: Negative.    HENT: Positive for voice change. Negative for sinus pressure and sore throat.    Eyes: Negative.    Respiratory: Negative.  Negative for cough.    Cardiovascular: Negative.  Negative for chest pain.   Gastrointestinal: Negative.    Endocrine: Negative.    Genitourinary: Negative.    Musculoskeletal: Positive for back pain, neck pain and neck stiffness.   Skin: Negative.    Allergic/Immunologic: Positive for environmental allergies.   Neurological: Negative.    Hematological: Negative.    Psychiatric/Behavioral: Negative.        Objective   Physical Exam  Vitals signs reviewed.   Constitutional:       General: He is not in acute distress.     Appearance: He is well-developed. He is obese. He is not ill-appearing or toxic-appearing.   HENT:      Head: Normocephalic and atraumatic.      Right Ear: Tympanic membrane, ear canal and external ear normal.      Left Ear: Tympanic membrane, ear canal and external ear normal.      Nose: Nose normal.   Eyes:      Extraocular Movements: Extraocular movements intact.      Conjunctiva/sclera: Conjunctivae normal.      Pupils: Pupils are equal, round, and reactive to light.   Neck:      Musculoskeletal: Neck rigidity and muscular tenderness present.      Vascular: No carotid bruit.   Cardiovascular:      Rate and Rhythm: Normal rate and regular rhythm.      Pulses:           Dorsalis pedis pulses are 1+ on the right side and 1+ on the left side.        Posterior tibial pulses are 1+ on the right side and 1+ on the left side.      Heart sounds: Normal heart sounds.   Pulmonary:      Effort: Pulmonary effort is normal.      Comments: Decreased breath sound fanny    Abdominal:      General: Bowel sounds are normal. There is no  distension.      Palpations: Abdomen is soft. There is no mass.      Tenderness: There is no abdominal tenderness.   Musculoskeletal:         General: Tenderness present.   Feet:      Right foot:      Skin integrity: Skin breakdown present.      Toenail Condition: Right toenails are normal.      Left foot:      Skin integrity: Skin breakdown present.      Toenail Condition: Left toenails are normal.      Comments:     Skin:     General: Skin is warm.   Neurological:      General: No focal deficit present.      Mental Status: He is alert and oriented to person, place, and time.   Psychiatric:         Mood and Affect: Mood normal.         Behavior: Behavior normal.       PHQ-9 Total Score:      Assessment/Plan   Diagnoses and all orders for this visit:    1. Encounter for annual general medical examination with abnormal findings in adult (Primary)  Comments:  Patient was advised to wear sunscreen and use seatbelt.    2. Screening for colon cancer  Comments:  Colonoscopy and Cologuard were discussed and he did choose Cologuard.  Orders:  -     Cologuard - Stool, Per Rectum; Future    3. Screening for prostate cancer  -     PSA Screen    4. Type 2 diabetes mellitus with hyperglycemia, without long-term current use of insulin (CMS/[facility])  Comments:  No low sugars and highest sugar was 140.  Will get eye exam soon.  Orders:  -     Comprehensive Metabolic Panel  -     Hemoglobin A1c  -     Microalbumin / Creatinine Urine Ratio - Urine, Clean Catch  -     TSH    5. Lesion of vocal cord  Comments:  Patient states that he cannot remember whether or not he was told to go back to the ear nose and throat doctor so we have advised to follow-up.  Orders:  -     Ambulatory Referral to ENT (Otolaryngology)    6. Mixed hyperlipidemia  Comments:  Patient is trying to watch saturated fats  Orders:  -     Lipid Panel    7. Essential hypertension  Comments:  Patient states that he has not been taking losartan for about 2 months as he ran  out.  Refilled and patient will home monitor.  Orders:  -     CBC Auto Differential    8. Coronary artery disease involving native coronary artery of native heart without angina pectoris  Comments:  No chest pain or shortness of breath patient will follow up with Dr. Browning in about 6 weeks.    9. Gout, unspecified cause, unspecified chronicity, unspecified site  Comments:  No kidney stones or joint swelling.  Orders:  -     Uric Acid    10. Vitamin D deficiency  Comments:  Patient does not take vitamin D regularly.  Orders:  -     Vitamin D 25 Hydroxy    11. Carotid stenosis, asymptomatic, bilateral  Comments:  Patient has not noticed any increase in dizziness.  Never did have the duplex of the carotid artery repeated and wishes to go back to the vascular surgeon.  Orders:  -     Ambulatory Referral to Vascular Surgery    12. Class 2 obesity with alveolar hypoventilation, serious comorbidity, and body mass index (BMI) of 35.0 to 35.9 in adult (CMS/[facility])  Comments:  Portion size and walking were discussed.    13. Morbid (severe) obesity due to excess calories (CMS/[facility])    Other orders  -     Cancel: US Carotid Bilateral; Future  -     losartan (COZAAR) 100 MG tablet; Take 1 tablet by mouth Daily.  Dispense: 90 tablet; Refill: 3        Patient Instructions     Health Maintenance Due   Topic Date Due   • COLONOSCOPY  1960   • TDAP/TD VACCINES (1 - Tdap) 12/20/1979   • ZOSTER VACCINE (1 of 2) 12/20/2010   • Pneumococcal Vaccine 0-64 (3 of 3 - PPSV23) 01/01/2018   • MENINGOCOCCAL VACCINE (2 - Risk 2-dose series) 01/11/2018   • DIABETIC EYE EXAM  03/01/2020   • INFLUENZA VACCINE  08/01/2020   • ANNUAL PHYSICAL  10/05/2020   • PROSTATE CANCER SCREENING  10/08/2020     Advise cologuard-call if question. Advise followup Vascular surgeon due to fanny carotid blockageCheck blood pressure cuff for accuracy and send 10 blood pressures over 2 weeks.  Watch sodium, alcohol and weight

## 2021-03-04 ENCOUNTER — HOSPITAL ENCOUNTER (OUTPATIENT)
Dept: CT IMAGING | Facility: HOSPITAL | Age: 61
Discharge: HOME | End: 2021-03-04
Admitting: INTERNAL MEDICINE

## 2021-03-04 DIAGNOSIS — R91.8 PULMONARY NODULES: ICD-10-CM

## 2021-03-04 PROCEDURE — 71250 CT THORAX DX C-: CPT

## 2021-03-11 ENCOUNTER — TELEPHONE (OUTPATIENT)
Dept: ONCOLOGY | Facility: CLINIC | Age: 61
End: 2021-03-11

## 2021-03-15 ENCOUNTER — APPOINTMENT (OUTPATIENT)
Dept: LAB | Facility: HOSPITAL | Age: 61
End: 2021-03-15

## 2021-03-15 RX ORDER — ATORVASTATIN CALCIUM 20 MG/1
TABLET, FILM COATED ORAL
Qty: 30 TABLET | Refills: 3 | Status: SHIPPED | OUTPATIENT
Start: 2021-03-15 | End: 2021-07-20

## 2021-04-16 NOTE — PROGRESS NOTES
Hematology/Oncology Outpatient Follow Up    PATIENT NAME:Dawson Lomax  :1960  MRN: [id number]  PRIMARY CARE PHYSICIAN: Kavitha Teran MD  REFERRING PHYSICIAN: Kavitha Teran MD    Chief Complaint   Patient presents with   • Appointment     Other elevated white blood cell (WBC) count   • Follow-up          HISTORY OF PRESENT ILLNESS:   This is a 59-year-old male who is here due to two to three month history of night sweats.  Patient denies any fevers or chills.  He has chest congestion for which he had a chest x-ray which was essentially clear.  He denies any other symptoms such as weight loss or fatigue symptoms.  He still has a cough which is productive of clear sputum.  He has no urinary symptoms.  He denies nausea, vomiting or diarrhea.  During his work up for the above complaint he had a CBC done on 3/27/15 which showed a white count of 14.7, hemoglobin 14.4, platelet count of 421,000.  His differentials were 48% neutrophils, 37% lymphocytes, 12% monocytes.  He has no basophilia or eosinophilia noted on his differential count.  He had mild monocytosis and also mild absolute lymphocytosis.  He is alone today for this appointment.    • 2015 - CT scan of the abdomen and pelvis which showed moderate stool and post splenectomy.  Otherwise negative.    • 2015 - Chest x-ray, essentially normal with no acute abnormalities identified.    • 4/1/15 - Patient had further testing including BCR-abl, which was negative for mutation.  His flow cytometry was negative for lymphoproliferative disease.  JORY-2 was negative.  Acute viral hepatitis panel was negative.  He had a normal B12 level, sed rate and LDH.  Uric acid was also normal and his urinalysis was negative for any infection.  His repeat CBC on the same day showed WBC count of 12.9, hemoglobin 13.8, platelet count 414,000.   • 6/10/15 - CT scan of the chest without contrast.  This did not reveal any pulmonary mass.  He has  increased upper abdominal lymph nodes, but within normal limits for size.    • 2/2/17 - Patient was admitted to the hospital for acute coronary syndrome.  Patient apparently had a myocardiac infarction.  He had one stent placed during hospitalization.  I saw him in the past for leukocytosis with negative work up.    • 2/28/17 - WBC 15.5, hemoglobin 13.7, platelet count 425,000.  Differentials were 47% neutrophils, 37% lymphocytes.  There was mild monocytosis at 12% [range 2-11].    • Labs since last visit of 3/1/17 - BCR-abl negative.  B12 (N) 541.  LDH (N) 178.  Uric acid (L) 3.8.    • 3/9/17 - Peripheral blood flow cytometry was negative.  JORY-2 was negative for mutation.    • 3/13/17 - Serum PSA 0.60.   • 2/28/18 - CT scan of the chest showed fatty infiltration of the liver.  There was a small 2 to 3 mm pleural based nodule posteriorly and laterally in the right lower lobe, stable from prior CT scan of 2017.   • 10/19/19-CT of the chest showed small subpleural 3 mm right lower lobe nodule.  Stable 4 mm subpleural right lower lobe nodule.  • 7/17/2020 patient had CT scan of the chest with a stable 3 mm lesion and 5 mm pleural-based nodules within the right lower lobe and stable 3 mm subpleural lesion as well.  These are stable since February 2018 consistent with benign.  Steatotic liver  • HPI has been reviewed        Past Medical History:   Diagnosis Date   • B12 deficiency 2/2/2015   • Broken finger     broken middle finger   • Coronary artery disease    • Gout    • Heart attack (CMS/HCC) 2017   • Hx of migraines    • Hyperlipidemia    • Hypertension    • Plantar fasciitis, left 1/9/2017       Past Surgical History:   Procedure Laterality Date   • ANKLE SURGERY Left 2001   • CARDIAC CATHETERIZATION  2017   • CARPAL TUNNEL RELEASE Bilateral    • CATARACT EXTRACTION  2015   • CERVICAL SPINE SURGERY     • HERNIA REPAIR     • OTHER SURGICAL HISTORY  2017    MI with Stent 2010, 2017    • SPLENECTOMY           Current  Outpatient Medications:   •  amoxicillin (AMOXIL) 875 MG tablet, Take 1 tablet by mouth 2 (Two) Times a Day., Disp: 20 tablet, Rfl: 0  •  aspirin 81 MG EC tablet, Take 81 mg by mouth 3 (Three) Times a Week., Disp: , Rfl:   •  atorvastatin (LIPITOR) 20 MG tablet, Take 1 tablet by mouth once daily, Disp: 30 tablet, Rfl: 3  •  benzonatate (Tessalon Perles) 100 MG capsule, Take 1 capsule by mouth 3 (Three) Times a Day As Needed for Cough., Disp: 30 capsule, Rfl: 1  •  cyclobenzaprine (FLEXERIL) 10 MG tablet, Take 10 mg by mouth 2 (Two) Times a Day As Needed., Disp: , Rfl:   •  dilTIAZem (TIAZAC) 300 MG 24 hr capsule, Take 300 mg by mouth Daily., Disp: , Rfl:   •  Empagliflozin (Jardiance) 25 MG tablet, Take 25 mg by mouth Daily., Disp: 30 tablet, Rfl: 6  •  fenofibrate (TRICOR) 145 MG tablet, Take 1 tablet by mouth once daily, Disp: 90 tablet, Rfl: 3  •  fenofibrate 160 MG tablet, 1 tablet p.o. daily (Patient taking differently: 145 mg. 1 tablet p.o. daily), Disp: 30 tablet, Rfl: 6  •  glyburide (DIAbeta) 5 MG tablet, Take 2 tablets by mouth 2 (Two) Times a Day., Disp: 120 tablet, Rfl: 3  •  hydroCHLOROthiazide (MICROZIDE) 12.5 MG capsule, Take 1 capsule by mouth once daily, Disp: 90 capsule, Rfl: 3  •  ibuprofen (ADVIL,MOTRIN) 800 MG tablet, Take 800 mg by mouth 3 (Three) Times a Day As Needed., Disp: , Rfl:   •  ipratropium-albuterol (DUO-NEB) 0.5-2.5 mg/3 ml nebulizer, Take 3 mL by nebulization Every 4 (Four) Hours As Needed for Wheezing., Disp: 360 mL, Rfl: 3  •  lidocaine (XYLOCAINE) 5 % ointment, USE OINTMENT EXTERNALLY THREE TIMES A DAY FOR 30 DAYS AS NEEDED, Disp: , Rfl: 0  •  losartan (COZAAR) 100 MG tablet, Take 1 tablet by mouth Daily., Disp: 90 tablet, Rfl: 3  •  LYRICA 100 MG capsule, Take 100 mg by mouth 2 (Two) Times a Day As Needed (patient states only takes as needed)., Disp: , Rfl: 0  •  metFORMIN ER (GLUCOPHAGE-XR) 500 MG 24 hr tablet, Take 2 tablets by mouth 2 (Two) Times a Day., Disp: 180 tablet,  Rfl: 4  •  metoprolol tartrate (LOPRESSOR) 25 MG tablet, Take 25 mg by mouth 2 (Two) Times a Day., Disp: , Rfl:   •  oxyCODONE (ROXICODONE) 10 MG tablet, Take 10 mg by mouth Every 6 (Six) Hours As Needed., Disp: , Rfl:   •  Semaglutide, 1 MG/DOSE, (Ozempic, 1 MG/DOSE,) 2 MG/1.5ML solution pen-injector, Inject 1 mg under the skin into the appropriate area as directed 1 (One) Time Per Week., Disp: 4 mL, Rfl: 6  •  tadalafil (Cialis) 5 MG tablet, Take 1 tablet by mouth Daily As Needed for Erectile Dysfunction., Disp: 90 tablet, Rfl: 4    Allergies   Allergen Reactions   • Gabapentin Dizziness       Family History   Problem Relation Age of Onset   • Diabetes Paternal Grandmother    • Cancer Other        Cancer-related family history includes Cancer in an other family member.    Social History     Tobacco Use   • Smoking status: Former Smoker     Packs/day: 0.50     Types: Cigarettes     Quit date:      Years since quittin.3   • Smokeless tobacco: Never Used   Substance Use Topics   • Alcohol use: No   • Drug use: No       I have reviewed and confirmed the accuracy of the patient's history: as entered by the MA/LPN/RN. Sita Yusuf MD 21     SUBJECTIVE:    The patient is here for a follow up appointment.   He has no new issues today    REVIEW OF SYSTEMS:  Review of Systems   Constitutional: Negative for chills and fever.   HENT: Negative for ear pain, mouth sores, nosebleeds and sore throat.    Eyes: Negative for photophobia and visual disturbance.   Respiratory: Negative for wheezing and stridor.    Cardiovascular: Negative for chest pain and palpitations.   Gastrointestinal: Negative for abdominal pain, diarrhea, nausea and vomiting.   Endocrine: Negative for cold intolerance and heat intolerance.   Genitourinary: Negative for dysuria and hematuria.   Musculoskeletal: Negative for joint swelling and neck stiffness.   Skin: Negative for color change and rash.   Neurological: Negative for  "seizures and syncope.   Hematological: Negative for adenopathy.        No obvious bleeding   Psychiatric/Behavioral: Negative for agitation, confusion and hallucinations.     I have reviewed and confirmed the accuracy of the ROS as documented by the MA/LPN/RN Sita Yusuf MD      OBJECTIVE:    Vitals:    04/19/21 1110   BP: 153/79   Pulse: 76   Resp: 20   Temp: 97.7 °F (36.5 °C)   Weight: 99 kg (218 lb 3.2 oz)   Height: 167.6 cm (65.98\")   PainSc:   8   PainLoc: Generalized  Comment: neck and lower back       ECOG    (0) Fully active, able to carry on all predisease performance without restriction    Physical Exam   Constitutional: He is oriented to person, place, and time. No distress.   HENT:   Head: Normocephalic and atraumatic.   Eyes: Conjunctivae are normal. Right eye exhibits no discharge. Left eye exhibits no discharge. No scleral icterus.   Neck: No thyromegaly present.   Cardiovascular: Normal rate, regular rhythm and normal heart sounds. Exam reveals no gallop and no friction rub.   Pulmonary/Chest: Effort normal. No stridor. No respiratory distress. He has no wheezes.   Abdominal: Soft. Bowel sounds are normal. He exhibits no mass. There is no abdominal tenderness. There is no rebound and no guarding.   Musculoskeletal: Normal range of motion. No tenderness.   Lymphadenopathy:     He has no cervical adenopathy.   Neurological: He is alert and oriented to person, place, and time. He exhibits normal muscle tone.   Skin: Skin is warm. No rash noted. He is not diaphoretic. No erythema.   Psychiatric: His behavior is normal. Judgment and thought content normal.   Nursing note and vitals reviewed.    I have reexamined the patient and the results are consistent with the previously documented exam. Sita Yusuf MD     RECENT LABS    WBC   Date Value Ref Range Status   04/19/2021 13.43 (H) 3.40 - 10.80 10*3/mm3 Final     RBC   Date Value Ref Range Status   04/19/2021 5.08 4.14 - 5.80 10*6/mm3 " Final     Hemoglobin   Date Value Ref Range Status   04/19/2021 16.0 13.0 - 17.7 g/dL Final   09/15/2020 15.2 13.5 - 17.5 g/dL Final     Hematocrit   Date Value Ref Range Status   04/19/2021 48.6 37.5 - 51.0 % Final   09/15/2020 46.2 41.0 - 53.0 % Final     MCV   Date Value Ref Range Status   04/19/2021 95.7 79.0 - 97.0 fL Final     MCH   Date Value Ref Range Status   04/19/2021 31.5 26.6 - 33.0 pg Final     MCHC   Date Value Ref Range Status   04/19/2021 32.9 31.5 - 35.7 g/dL Final     RDW   Date Value Ref Range Status   04/19/2021 14.3 12.3 - 15.4 % Final     RDW-SD   Date Value Ref Range Status   04/19/2021 48.9 37.0 - 54.0 fl Final     MPV   Date Value Ref Range Status   04/19/2021 8.6 6.0 - 12.0 fL Final     Platelets   Date Value Ref Range Status   04/19/2021 408 140 - 450 10*3/mm3 Final     Neutrophil %   Date Value Ref Range Status   04/19/2021 48.8 42.7 - 76.0 % Final     Lymphocyte %   Date Value Ref Range Status   04/19/2021 36.7 19.6 - 45.3 % Final     Monocyte %   Date Value Ref Range Status   04/19/2021 12.4 (H) 5.0 - 12.0 % Final     Eosinophil %   Date Value Ref Range Status   04/19/2021 1.5 0.3 - 6.2 % Final     Basophil %   Date Value Ref Range Status   04/19/2021 0.6 0.0 - 1.5 % Final     Immature Grans %   Date Value Ref Range Status   07/09/2020 0.6 (H) 0.0 - 0.5 % Final     Neutrophils, Absolute   Date Value Ref Range Status   04/19/2021 6.56 1.70 - 7.00 10*3/mm3 Final     Lymphocytes, Absolute   Date Value Ref Range Status   04/19/2021 4.93 (H) 0.70 - 3.10 10*3/mm3 Final     Monocytes, Absolute   Date Value Ref Range Status   04/19/2021 1.66 (H) 0.10 - 0.90 10*3/mm3 Final     Eosinophils, Absolute   Date Value Ref Range Status   04/19/2021 0.20 0.00 - 0.40 10*3/mm3 Final     Basophils, Absolute   Date Value Ref Range Status   04/19/2021 0.08 0.00 - 0.20 10*3/mm3 Final     Immature Grans, Absolute   Date Value Ref Range Status   07/09/2020 0.08 (H) 0.00 - 0.05 10*3/mm3 Final     nRBC   Date  Value Ref Range Status   07/09/2020 0.0 0.0 - 0.2 /100 WBC Final       Lab Results   Component Value Date    GLUCOSE 135 (H) 10/29/2020    BUN 15 10/29/2020    CREATININE 0.83 10/29/2020    EGFRIFNONA 95 10/29/2020    BCR 18.1 10/29/2020    K 3.8 10/29/2020    CO2 24.0 10/29/2020    CALCIUM 9.7 10/29/2020    ALBUMIN 4.50 10/29/2020    LABIL2 1.3 08/24/2020    AST 20 10/29/2020    ALT 26 10/29/2020           ASSESSMENT:    1. Leukocytosis likely reactive and also post splenectomy with negative peripheral work up so far.      2. History of right pulmonary nodules.  Nodules have remained stable for approximately 3 years.  No further follow-up is required per Radiologist.  Reviewed his CT scans with him today    PLANS:      His leukocytosis is stable.  Peripheral work-up in the past has been negative  He will continue to follow up with Dr. Teran, his primary care physician.   Follow-up 1 year       I have reviewed labs results, imaging, vitals, and medications with the patient today. Will follow up in 12 months with me .      Patient verbalized understanding and is in agreement of the above plan.

## 2021-04-19 ENCOUNTER — OFFICE VISIT (OUTPATIENT)
Dept: ONCOLOGY | Facility: CLINIC | Age: 61
End: 2021-04-19

## 2021-04-19 ENCOUNTER — LAB (OUTPATIENT)
Dept: LAB | Facility: HOSPITAL | Age: 61
End: 2021-04-19

## 2021-04-19 VITALS
BODY MASS INDEX: 35.07 KG/M2 | HEART RATE: 76 BPM | TEMPERATURE: 97.7 F | HEIGHT: 66 IN | WEIGHT: 218.2 LBS | RESPIRATION RATE: 20 BRPM | SYSTOLIC BLOOD PRESSURE: 153 MMHG | DIASTOLIC BLOOD PRESSURE: 79 MMHG

## 2021-04-19 DIAGNOSIS — D72.828 OTHER ELEVATED WHITE BLOOD CELL (WBC) COUNT: ICD-10-CM

## 2021-04-19 DIAGNOSIS — D72.829 LEUKOCYTOSIS, UNSPECIFIED TYPE: ICD-10-CM

## 2021-04-19 DIAGNOSIS — R91.8 PULMONARY NODULES: ICD-10-CM

## 2021-04-19 DIAGNOSIS — D72.828 OTHER ELEVATED WHITE BLOOD CELL (WBC) COUNT: Primary | ICD-10-CM

## 2021-04-19 LAB
BASOPHILS # BLD AUTO: 0.08 10*3/MM3 (ref 0–0.2)
BASOPHILS NFR BLD AUTO: 0.6 % (ref 0–1.5)
DEPRECATED RDW RBC AUTO: 48.9 FL (ref 37–54)
EOSINOPHIL # BLD AUTO: 0.2 10*3/MM3 (ref 0–0.4)
EOSINOPHIL NFR BLD AUTO: 1.5 % (ref 0.3–6.2)
ERYTHROCYTE [DISTWIDTH] IN BLOOD BY AUTOMATED COUNT: 14.3 % (ref 12.3–15.4)
HCT VFR BLD AUTO: 48.6 % (ref 37.5–51)
HGB BLD-MCNC: 16 G/DL (ref 13–17.7)
LYMPHOCYTES # BLD AUTO: 4.93 10*3/MM3 (ref 0.7–3.1)
LYMPHOCYTES NFR BLD AUTO: 36.7 % (ref 19.6–45.3)
MCH RBC QN AUTO: 31.5 PG (ref 26.6–33)
MCHC RBC AUTO-ENTMCNC: 32.9 G/DL (ref 31.5–35.7)
MCV RBC AUTO: 95.7 FL (ref 79–97)
MONOCYTES # BLD AUTO: 1.66 10*3/MM3 (ref 0.1–0.9)
MONOCYTES NFR BLD AUTO: 12.4 % (ref 5–12)
NEUTROPHILS NFR BLD AUTO: 48.8 % (ref 42.7–76)
NEUTROPHILS NFR BLD AUTO: 6.56 10*3/MM3 (ref 1.7–7)
PLATELET # BLD AUTO: 408 10*3/MM3 (ref 140–450)
PMV BLD AUTO: 8.6 FL (ref 6–12)
RBC # BLD AUTO: 5.08 10*6/MM3 (ref 4.14–5.8)
WBC # BLD AUTO: 13.43 10*3/MM3 (ref 3.4–10.8)

## 2021-04-19 PROCEDURE — 85025 COMPLETE CBC W/AUTO DIFF WBC: CPT

## 2021-04-19 PROCEDURE — 99213 OFFICE O/P EST LOW 20 MIN: CPT | Performed by: INTERNAL MEDICINE

## 2021-04-19 PROCEDURE — 36415 COLL VENOUS BLD VENIPUNCTURE: CPT

## 2021-05-03 ENCOUNTER — TELEPHONE (OUTPATIENT)
Dept: FAMILY MEDICINE CLINIC | Facility: CLINIC | Age: 61
End: 2021-05-03

## 2021-05-03 RX ORDER — DILTIAZEM HYDROCHLORIDE 300 MG/1
300 CAPSULE, EXTENDED RELEASE ORAL DAILY
COMMUNITY
Start: 2021-03-14 | End: 2021-05-06

## 2021-05-03 NOTE — TELEPHONE ENCOUNTER
I called and spoke with pt. He said that he still has the Cologuard test. He said that he has it at his home, he has had a whole lot of things going on he will get it done, and sent back in the next couple of weeks. I advised that I would defer the order for 1 month to give him a chance to get it sent back in.

## 2021-05-03 NOTE — TELEPHONE ENCOUNTER
----- Message from Kavitha Teran MD sent at 5/3/2021 12:32 PM EDT -----  Cologuard not returned. Can help reorder or send green folder for colonoscopy

## 2021-05-06 ENCOUNTER — OFFICE VISIT (OUTPATIENT)
Dept: CARDIOLOGY | Facility: CLINIC | Age: 61
End: 2021-05-06

## 2021-05-06 VITALS
BODY MASS INDEX: 34.87 KG/M2 | DIASTOLIC BLOOD PRESSURE: 74 MMHG | WEIGHT: 217 LBS | HEART RATE: 70 BPM | SYSTOLIC BLOOD PRESSURE: 134 MMHG | HEIGHT: 66 IN

## 2021-05-06 DIAGNOSIS — R00.0 TACHYCARDIA: ICD-10-CM

## 2021-05-06 DIAGNOSIS — I10 ESSENTIAL HYPERTENSION: ICD-10-CM

## 2021-05-06 DIAGNOSIS — R01.1 HEART MURMUR: ICD-10-CM

## 2021-05-06 DIAGNOSIS — E11.65 TYPE 2 DIABETES MELLITUS WITH HYPERGLYCEMIA, WITHOUT LONG-TERM CURRENT USE OF INSULIN (HCC): ICD-10-CM

## 2021-05-06 DIAGNOSIS — E78.2 MIXED HYPERLIPIDEMIA: ICD-10-CM

## 2021-05-06 DIAGNOSIS — I25.10 CORONARY ARTERY DISEASE INVOLVING NATIVE CORONARY ARTERY OF NATIVE HEART WITHOUT ANGINA PECTORIS: Primary | ICD-10-CM

## 2021-05-06 PROCEDURE — 93000 ELECTROCARDIOGRAM COMPLETE: CPT | Performed by: INTERNAL MEDICINE

## 2021-05-06 PROCEDURE — 99214 OFFICE O/P EST MOD 30 MIN: CPT | Performed by: INTERNAL MEDICINE

## 2021-05-06 NOTE — PROGRESS NOTES
Date of Office Visit: 2021  Encounter Provider: Dr. Prashanth Parikh  Place of Service: [facility] CARDIOLOGY [place]  Patient Name: Dawson Lomax  :1960  Kavitha Teran MD    Chief Complaint   Patient presents with   • Coronary Artery Disease     6 month follo wup   • Hypertension   • Hyperlipidemia   • Diabetes     History of Present Illness    I am pleased to see  in my office today as a follow-up.     As you know, patient is 60-year-old white gentleman whose past medical history is significant for hypertension, diabetes mellitus, hyperlipidemia, who came today for follow-up     In , patient was admitted with the symptom of chest pain.  Patient underwent cardiac catheterization which showed nonobstructive CAD.  In 2017, patient underwent stress test again with chest pain.  Patient underwent stress test which showed possible inferior wall ischemia.  Repeat cardiac catheterization showed nonobstructive CAD.  Medical  treatment was recommended.    In 2019, patient was presented to me for symptom of left-sided chest pain.  At that time, patient also had CAT scan of chest which showed calcification of coronary arteries.  I recommended to proceed with stress test but patient did not have stress test.    In 2020, patient underwent stress test which was negative for ischemia or myocardial infarction.  Echocardiogram showed normal left ventricle size and function with EF of 55 to 60%.  Mild mitral regurgitation was noted.    Since the previous visit, patient is overall doing fairly well from cardiovascular standpoint.  Patient denies any symptom of chest pain or tightness or heaviness.  Patient denies any orthopnea, PND, syncope or presyncope.  No leg edema noted.  No palpitation.    EKG showed normal sinus rhythm.  Leftward axis noted.    Clinically patient is doing fairly well.  Patient blood pressure is very well controlled.  Patient does not have any  symptom of congestive heart failure.  I will continue current treatment.  I will see the patient in 1 year.        Past Medical History:   Diagnosis Date   • B12 deficiency 2/2/2015   • Broken finger     broken middle finger   • Coronary artery disease    • Diabetes mellitus (CMS/HCC)    • Gout    • Heart attack (CMS/HCC) 2017   • Heart murmur    • Hx of migraines    • Hyperlipidemia    • Hypertension    • Plantar fasciitis, left 1/9/2017         Past Surgical History:   Procedure Laterality Date   • ANKLE SURGERY Left 2001   • CARDIAC CATHETERIZATION  2017   • CARPAL TUNNEL RELEASE Bilateral    • CATARACT EXTRACTION  2015   • CERVICAL SPINE SURGERY     • HERNIA REPAIR     • OTHER SURGICAL HISTORY  2017    MI with Stent 2010, 2017    • SPLENECTOMY             Current Outpatient Medications:   •  aspirin 81 MG EC tablet, Take 81 mg by mouth 3 (Three) Times a Week., Disp: , Rfl:   •  atorvastatin (LIPITOR) 20 MG tablet, Take 1 tablet by mouth once daily, Disp: 30 tablet, Rfl: 3  •  cyclobenzaprine (FLEXERIL) 10 MG tablet, Take 10 mg by mouth 2 (Two) Times a Day As Needed., Disp: , Rfl:   •  dilTIAZem (TIAZAC) 300 MG 24 hr capsule, Take 300 mg by mouth Daily., Disp: , Rfl:   •  Empagliflozin (Jardiance) 25 MG tablet, Take 25 mg by mouth Daily., Disp: 30 tablet, Rfl: 6  •  fenofibrate (TRICOR) 145 MG tablet, Take 1 tablet by mouth once daily, Disp: 90 tablet, Rfl: 3  •  glyburide (DIAbeta) 5 MG tablet, Take 2 tablets by mouth 2 (Two) Times a Day., Disp: 120 tablet, Rfl: 3  •  hydroCHLOROthiazide (MICROZIDE) 12.5 MG capsule, Take 1 capsule by mouth once daily, Disp: 90 capsule, Rfl: 3  •  ibuprofen (ADVIL,MOTRIN) 800 MG tablet, Take 800 mg by mouth 3 (Three) Times a Day As Needed., Disp: , Rfl:   •  ipratropium-albuterol (DUO-NEB) 0.5-2.5 mg/3 ml nebulizer, Take 3 mL by nebulization Every 4 (Four) Hours As Needed for Wheezing., Disp: 360 mL, Rfl: 3  •  lidocaine (XYLOCAINE) 5 % ointment, USE OINTMENT EXTERNALLY THREE  TIMES A DAY FOR 30 DAYS AS NEEDED, Disp: , Rfl: 0  •  losartan (COZAAR) 100 MG tablet, Take 1 tablet by mouth Daily., Disp: 90 tablet, Rfl: 3  •  LYRICA 100 MG capsule, Take 100 mg by mouth 2 (Two) Times a Day As Needed (patient states only takes as needed)., Disp: , Rfl: 0  •  metFORMIN ER (GLUCOPHAGE-XR) 500 MG 24 hr tablet, Take 2 tablets by mouth 2 (Two) Times a Day., Disp: 180 tablet, Rfl: 4  •  metoprolol tartrate (LOPRESSOR) 25 MG tablet, Take 25 mg by mouth 2 (Two) Times a Day., Disp: , Rfl:   •  oxyCODONE (ROXICODONE) 10 MG tablet, Take 10 mg by mouth Every 6 (Six) Hours As Needed., Disp: , Rfl:   •  Semaglutide, 1 MG/DOSE, (Ozempic, 1 MG/DOSE,) 2 MG/1.5ML solution pen-injector, Inject 1 mg under the skin into the appropriate area as directed 1 (One) Time Per Week., Disp: 4 mL, Rfl: 6  •  tadalafil (Cialis) 5 MG tablet, Take 1 tablet by mouth Daily As Needed for Erectile Dysfunction., Disp: 90 tablet, Rfl: 4      Social History     Socioeconomic History   • Marital status:      Spouse name: Not on file   • Number of children: Not on file   • Years of education: Not on file   • Highest education level: Not on file   Tobacco Use   • Smoking status: Former Smoker     Packs/day: 0.50     Types: Cigarettes     Quit date:      Years since quittin.3   • Smokeless tobacco: Never Used   Vaping Use   • Vaping Use: Never used   Substance and Sexual Activity   • Alcohol use: No   • Drug use: No   • Sexual activity: Yes     Partners: Female     Birth control/protection: None         Review of Systems   Constitutional: Negative for chills and fever.   HENT: Negative for ear discharge and nosebleeds.    Eyes: Negative for discharge and redness.   Cardiovascular: Negative for chest pain, orthopnea, palpitations, paroxysmal nocturnal dyspnea and syncope.   Respiratory: Positive for shortness of breath. Negative for cough and wheezing.    Endocrine: Negative for heat intolerance.   Skin: Negative for rash.  "  Musculoskeletal: Positive for back pain. Negative for arthritis and myalgias.   Gastrointestinal: Negative for abdominal pain, melena, nausea and vomiting.   Genitourinary: Negative for dysuria and hematuria.   Neurological: Negative for dizziness, light-headedness, numbness and tremors.   Psychiatric/Behavioral: Negative for depression. The patient is not nervous/anxious.        Procedures      ECG 12 Lead    Date/Time: 5/6/2021 4:38 PM  Performed by: Prashnath Parikh MD  Authorized by: Prashanth Parikh MD   Comparison: compared with previous ECG   Similar to previous ECG  Rhythm: sinus rhythm    Clinical impression: normal ECG            ECG 12 Lead    (Results Pending)           Objective:    /74   Pulse 70   Ht 167.6 cm (65.98\")   Wt 98.4 kg (217 lb)   BMI 35.04 kg/m²         Constitutional:       Appearance: Well-developed.   Eyes:      General: No scleral icterus.        Right eye: No discharge.   HENT:      Head: Normocephalic and atraumatic.   Neck:      Thyroid: No thyromegaly.      Lymphadenopathy: No cervical adenopathy.   Pulmonary:      Effort: Pulmonary effort is normal. No respiratory distress.      Breath sounds: Normal breath sounds. No wheezing. No rales.   Cardiovascular:      Normal rate. Regular rhythm.      No gallop.   Abdominal:      Tenderness: There is no abdominal tenderness.   Skin:     Findings: No erythema or rash.   Neurological:      Mental Status: Alert and oriented to person, place, and time.             Assessment:       Diagnosis Plan   1. Coronary artery disease involving native coronary artery of native heart without angina pectoris  ECG 12 Lead   2. Essential hypertension  ECG 12 Lead   3. Mixed hyperlipidemia  ECG 12 Lead   4. Type 2 diabetes mellitus with hyperglycemia, without long-term current use of insulin (CMS/[facility])  ECG 12 Lead   5. Tachycardia  ECG 12 Lead   6. Heart murmur  ECG 12 Lead            Plan:       MDM:    1.  CAD:    Patient has nonobstructive CAD.  " Clinically patient does not have any symptom of coronary insufficiency.  Continue risk factor modification    2.  Hypertension:    Blood pressure is very well controlled.    3.  Hyperlipidemia:    Patient is on Lipitor.    4.  Diabetes mellitus:    Patient is advised to increase aerobic activity and diet modification discussed.  His last A1c was 7.3.    5.  Mitral regurgitation:    Echocardiogram showed mild mitral regurgitation.  Recommend observation.

## 2021-05-07 ENCOUNTER — LAB (OUTPATIENT)
Dept: LAB | Facility: HOSPITAL | Age: 61
End: 2021-05-07

## 2021-05-07 DIAGNOSIS — E11.65 TYPE 2 DIABETES MELLITUS WITH HYPERGLYCEMIA, WITHOUT LONG-TERM CURRENT USE OF INSULIN (HCC): ICD-10-CM

## 2021-05-07 LAB
ALBUMIN SERPL-MCNC: 4.1 G/DL (ref 3.5–5.2)
ALBUMIN UR-MCNC: 1.7 MG/DL
ALBUMIN/GLOB SERPL: 1.4 G/DL
ALP SERPL-CCNC: 75 U/L (ref 39–117)
ALT SERPL W P-5'-P-CCNC: 22 U/L (ref 1–41)
ANION GAP SERPL CALCULATED.3IONS-SCNC: 10.5 MMOL/L (ref 5–15)
AST SERPL-CCNC: 16 U/L (ref 1–40)
BILIRUB SERPL-MCNC: 0.4 MG/DL (ref 0–1.2)
BUN SERPL-MCNC: 18 MG/DL (ref 8–23)
BUN/CREAT SERPL: 21.4 (ref 7–25)
CALCIUM SPEC-SCNC: 8.9 MG/DL (ref 8.6–10.5)
CHLORIDE SERPL-SCNC: 104 MMOL/L (ref 98–107)
CHOLEST SERPL-MCNC: 94 MG/DL (ref 0–200)
CO2 SERPL-SCNC: 23.5 MMOL/L (ref 22–29)
CREAT SERPL-MCNC: 0.84 MG/DL (ref 0.76–1.27)
CREAT UR-MCNC: 54.9 MG/DL
GFR SERPL CREATININE-BSD FRML MDRD: 93 ML/MIN/1.73
GLOBULIN UR ELPH-MCNC: 3 GM/DL
GLUCOSE SERPL-MCNC: 166 MG/DL (ref 65–99)
HBA1C MFR BLD: 7.3 % (ref 3.5–5.6)
HDLC SERPL-MCNC: 27 MG/DL (ref 40–60)
LDLC SERPL CALC-MCNC: 35 MG/DL (ref 0–100)
LDLC/HDLC SERPL: 1.04 {RATIO}
MICROALBUMIN/CREAT UR: 31 MG/G
POTASSIUM SERPL-SCNC: 3.7 MMOL/L (ref 3.5–5.2)
PROT SERPL-MCNC: 7.1 G/DL (ref 6–8.5)
SODIUM SERPL-SCNC: 138 MMOL/L (ref 136–145)
TRIGL SERPL-MCNC: 195 MG/DL (ref 0–150)
VLDLC SERPL-MCNC: 32 MG/DL (ref 5–40)

## 2021-05-07 PROCEDURE — 36415 COLL VENOUS BLD VENIPUNCTURE: CPT

## 2021-05-07 PROCEDURE — 80053 COMPREHEN METABOLIC PANEL: CPT

## 2021-05-07 PROCEDURE — 82043 UR ALBUMIN QUANTITATIVE: CPT

## 2021-05-07 PROCEDURE — 82570 ASSAY OF URINE CREATININE: CPT

## 2021-05-07 PROCEDURE — 80061 LIPID PANEL: CPT

## 2021-05-07 PROCEDURE — 83036 HEMOGLOBIN GLYCOSYLATED A1C: CPT

## 2021-05-11 ENCOUNTER — TELEPHONE (OUTPATIENT)
Dept: ENDOCRINOLOGY | Facility: CLINIC | Age: 61
End: 2021-05-11

## 2021-05-11 NOTE — TELEPHONE ENCOUNTER
A1c 7.3%, rest of the labs looks good.  Continue current medications and continue to work on diet and activity.   Class I (easy) - visualization of the soft palate, fauces, uvula, and both anterior and posterior pillars

## 2021-05-11 NOTE — TELEPHONE ENCOUNTER
Pt lvm stating he would like lab results. He was scheduled to come today, but has rescheduled. Lab notes said would discuss with patient at appt today.

## 2021-06-03 RX ORDER — GLYBURIDE 5 MG/1
TABLET ORAL
Qty: 120 TABLET | Refills: 3 | Status: SHIPPED | OUTPATIENT
Start: 2021-06-03 | End: 2022-03-28

## 2021-06-10 PROBLEM — S12.9XXA PSEUDOARTHROSIS OF CERVICAL SPINE (HCC): Status: ACTIVE | Noted: 2021-05-11

## 2021-06-21 RX ORDER — EMPAGLIFLOZIN 25 MG/1
TABLET, FILM COATED ORAL
Qty: 30 TABLET | Refills: 11 | Status: SHIPPED | OUTPATIENT
Start: 2021-06-21 | End: 2022-07-05

## 2021-06-21 RX ORDER — DILTIAZEM HYDROCHLORIDE 300 MG/1
CAPSULE, EXTENDED RELEASE ORAL
Qty: 90 CAPSULE | Refills: 0 | OUTPATIENT
Start: 2021-06-21

## 2021-06-21 RX ORDER — DILTIAZEM HYDROCHLORIDE 300 MG/1
300 CAPSULE, EXTENDED RELEASE ORAL DAILY
Qty: 90 CAPSULE | Refills: 3 | Status: SHIPPED | OUTPATIENT
Start: 2021-06-21 | End: 2022-06-24

## 2021-07-06 RX ORDER — SEMAGLUTIDE 1.34 MG/ML
INJECTION, SOLUTION SUBCUTANEOUS
Qty: 4 ML | Refills: 2 | Status: SHIPPED | OUTPATIENT
Start: 2021-07-06 | End: 2021-09-29

## 2021-07-13 ENCOUNTER — CLINICAL SUPPORT (OUTPATIENT)
Dept: FAMILY MEDICINE CLINIC | Facility: CLINIC | Age: 61
End: 2021-07-13

## 2021-07-13 DIAGNOSIS — E66.09 CLASS 2 OBESITY DUE TO EXCESS CALORIES WITHOUT SERIOUS COMORBIDITY WITH BODY MASS INDEX (BMI) OF 35.0 TO 35.9 IN ADULT: ICD-10-CM

## 2021-07-13 DIAGNOSIS — I10 ESSENTIAL HYPERTENSION: ICD-10-CM

## 2021-07-13 DIAGNOSIS — M47.22 CERVICAL SPONDYLOSIS WITH RADICULOPATHY: ICD-10-CM

## 2021-07-13 DIAGNOSIS — K76.0 FATTY LIVER: ICD-10-CM

## 2021-07-13 LAB
25(OH)D3 SERPL-MCNC: 21 NG/ML (ref 30–100)
BASOPHILS # BLD AUTO: 0.1 10*3/MM3 (ref 0–0.2)
BASOPHILS NFR BLD AUTO: 0.7 % (ref 0–1.5)
DEPRECATED RDW RBC AUTO: 42.8 FL (ref 37–54)
EOSINOPHIL # BLD AUTO: 0 10*3/MM3 (ref 0–0.4)
EOSINOPHIL NFR BLD AUTO: 0 % (ref 0.3–6.2)
ERYTHROCYTE [DISTWIDTH] IN BLOOD BY AUTOMATED COUNT: 12.5 % (ref 12.3–15.4)
HCT VFR BLD AUTO: 46.8 % (ref 37.5–51)
HGB BLD-MCNC: 16.1 G/DL (ref 13–17.7)
IMM GRANULOCYTES # BLD AUTO: 0.07 10*3/MM3 (ref 0–0.05)
IMM GRANULOCYTES NFR BLD AUTO: 0.5 % (ref 0–0.5)
LYMPHOCYTES # BLD AUTO: 4.9 10*3/MM3 (ref 0.7–3.1)
LYMPHOCYTES NFR BLD AUTO: 34.8 % (ref 19.6–45.3)
MCH RBC QN AUTO: 32.5 PG (ref 26.6–33)
MCHC RBC AUTO-ENTMCNC: 34.4 G/DL (ref 31.5–35.7)
MCV RBC AUTO: 94.4 FL (ref 79–97)
MONOCYTES # BLD AUTO: 1.84 10*3/MM3 (ref 0.1–0.9)
MONOCYTES NFR BLD AUTO: 13 % (ref 5–12)
NEUTROPHILS NFR BLD AUTO: 51 % (ref 42.7–76)
NEUTROPHILS NFR BLD AUTO: 7.19 10*3/MM3 (ref 1.7–7)
NRBC BLD AUTO-RTO: 0 /100 WBC (ref 0–0.2)
PLATELET # BLD AUTO: 376 10*3/MM3 (ref 140–450)
PMV BLD AUTO: 10.1 FL (ref 6–12)
PSA SERPL-MCNC: 0.96 NG/ML (ref 0–4)
RBC # BLD AUTO: 4.96 10*6/MM3 (ref 4.14–5.8)
TSH SERPL DL<=0.05 MIU/L-ACNC: 2.08 UIU/ML (ref 0.27–4.2)
URATE SERPL-MCNC: 5.4 MG/DL (ref 3.4–7)
WBC # BLD AUTO: 14.1 10*3/MM3 (ref 3.4–10.8)

## 2021-07-13 PROCEDURE — 85025 COMPLETE CBC W/AUTO DIFF WBC: CPT | Performed by: PREVENTIVE MEDICINE

## 2021-07-13 PROCEDURE — 36415 COLL VENOUS BLD VENIPUNCTURE: CPT | Performed by: PREVENTIVE MEDICINE

## 2021-07-13 PROCEDURE — 82306 VITAMIN D 25 HYDROXY: CPT | Performed by: PREVENTIVE MEDICINE

## 2021-07-13 PROCEDURE — G0103 PSA SCREENING: HCPCS | Performed by: PREVENTIVE MEDICINE

## 2021-07-13 PROCEDURE — 84443 ASSAY THYROID STIM HORMONE: CPT | Performed by: PREVENTIVE MEDICINE

## 2021-07-13 PROCEDURE — 84550 ASSAY OF BLOOD/URIC ACID: CPT | Performed by: PREVENTIVE MEDICINE

## 2021-07-13 NOTE — PROGRESS NOTES
Venipuncture Blood Specimen Collection  Venipuncture performed in right arm  by Simi Delacruz MA with good hemostasis. Patient tolerated the procedure well without complications.   07/13/21   LUIS FERNANDO Steen MD

## 2021-07-16 ENCOUNTER — TELEPHONE (OUTPATIENT)
Dept: FAMILY MEDICINE CLINIC | Facility: CLINIC | Age: 61
End: 2021-07-16

## 2021-07-16 NOTE — TELEPHONE ENCOUNTER
----- Message from Kavitha Teran MD sent at 7/16/2021  1:00 PM EDT -----  WBC is elevated so should recheck if signs of infection such as fever, chills, dysuria, sore throat cough or abdominal pain.  Voitamin D is low so increase or start otc dose.

## 2021-07-16 NOTE — TELEPHONE ENCOUNTER
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

## 2021-07-20 RX ORDER — ATORVASTATIN CALCIUM 20 MG/1
TABLET, FILM COATED ORAL
Qty: 30 TABLET | Refills: 11 | Status: SHIPPED | OUTPATIENT
Start: 2021-07-20 | End: 2022-01-21 | Stop reason: CLARIF

## 2021-09-29 RX ORDER — SEMAGLUTIDE 1.34 MG/ML
INJECTION, SOLUTION SUBCUTANEOUS
Qty: 3 ML | Refills: 11 | Status: SHIPPED | OUTPATIENT
Start: 2021-09-29 | End: 2022-05-31

## 2021-11-19 ENCOUNTER — TELEPHONE (OUTPATIENT)
Dept: ENDOCRINOLOGY | Facility: CLINIC | Age: 61
End: 2021-11-19

## 2021-11-19 NOTE — TELEPHONE ENCOUNTER
PA submitted via [email or web address]    Approvedtoday  PA Case: [id number], Status: Approved, Coverage Starts on: 11/19/2021 12:00:00 AM, Coverage Ends on: 11/19/2022 12:00:00 AM.

## 2021-12-06 ENCOUNTER — OFFICE VISIT (OUTPATIENT)
Dept: FAMILY MEDICINE CLINIC | Facility: CLINIC | Age: 61
End: 2021-12-06

## 2021-12-06 VITALS
WEIGHT: 216.6 LBS | SYSTOLIC BLOOD PRESSURE: 155 MMHG | BODY MASS INDEX: 34.81 KG/M2 | DIASTOLIC BLOOD PRESSURE: 73 MMHG | TEMPERATURE: 97.3 F | HEIGHT: 66 IN | HEART RATE: 80 BPM | OXYGEN SATURATION: 95 %

## 2021-12-06 DIAGNOSIS — R05.9 COUGH: Primary | ICD-10-CM

## 2021-12-06 DIAGNOSIS — I10 ESSENTIAL HYPERTENSION: ICD-10-CM

## 2021-12-06 DIAGNOSIS — E66.09 CLASS 1 OBESITY DUE TO EXCESS CALORIES WITH SERIOUS COMORBIDITY AND BODY MASS INDEX (BMI) OF 34.0 TO 34.9 IN ADULT: ICD-10-CM

## 2021-12-06 PROBLEM — E66.9 OBESITY: Status: ACTIVE | Noted: 2021-03-01

## 2021-12-06 PROBLEM — E66.811 CLASS 1 OBESITY DUE TO EXCESS CALORIES WITH SERIOUS COMORBIDITY AND BODY MASS INDEX (BMI) OF 34.0 TO 34.9 IN ADULT: Status: ACTIVE | Noted: 2017-04-24

## 2021-12-06 LAB — POTASSIUM SERPL-SCNC: 3.5 MMOL/L (ref 3.5–5.2)

## 2021-12-06 PROCEDURE — 99213 OFFICE O/P EST LOW 20 MIN: CPT | Performed by: PREVENTIVE MEDICINE

## 2021-12-06 PROCEDURE — 84132 ASSAY OF SERUM POTASSIUM: CPT | Performed by: PREVENTIVE MEDICINE

## 2021-12-06 PROCEDURE — U0004 COV-19 TEST NON-CDC HGH THRU: HCPCS | Performed by: PREVENTIVE MEDICINE

## 2021-12-06 RX ORDER — HYDROCHLOROTHIAZIDE 12.5 MG/1
12.5 CAPSULE, GELATIN COATED ORAL DAILY
Qty: 90 CAPSULE | Refills: 0 | Status: SHIPPED | OUTPATIENT
Start: 2021-12-06 | End: 2022-04-07

## 2021-12-06 RX ORDER — AMOXICILLIN 875 MG/1
875 TABLET, COATED ORAL 2 TIMES DAILY
Qty: 20 TABLET | Refills: 0 | Status: SHIPPED | OUTPATIENT
Start: 2021-12-06 | End: 2022-01-14

## 2021-12-06 NOTE — PATIENT INSTRUCTIONS
Check blood pressure cuff for accuracy and send 10 blood pressures over 2 weeks.  Watch sodium, alcohol and weight

## 2021-12-06 NOTE — PROGRESS NOTES
Venipuncture Blood Specimen Collection  Venipuncture performed in right arm by Zenobia Lester MA with good hemostasis. Patient tolerated the procedure well without complications.   12/06/21   Zenobia Lester MA

## 2021-12-06 NOTE — PROGRESS NOTES
"Subjective   Dawson Lomax is a 60 y.o. male presents for   Chief Complaint   Patient presents with   • URI     Patient does not have spleen and has same symptoms this time of year.   • Cough   Patient presents with what started off as a head cold with into his throat and now down into his chest for the last 4 days he has had no fever chills sputum started off creamy and now is somewhat brown no fever or chills patient has had 1 Covid vaccination throat is no longer sore no diarrhea or dysuria.  Patient's wife is ill with the same thing and has been improving as she was treated for bronchitis.  No known Covid exposure    Health Maintenance Due   Topic Date Due   • COLORECTAL CANCER SCREENING  Never done   • COVID-19 Vaccine (1) Never done   • TDAP/TD VACCINES (1 - Tdap) Never done   • ZOSTER VACCINE (1 of 2) Never done   • Pneumococcal Vaccine 0-64 (3 of 4 - PPSV23) 01/01/2018   • DIABETIC EYE EXAM  03/01/2020   • DIABETIC FOOT EXAM  07/07/2021   • INFLUENZA VACCINE  08/01/2021   • HEMOGLOBIN A1C  11/07/2021       History of Present Illness     Vitals:    12/06/21 1201 12/06/21 1203 12/06/21 1204   BP: 152/57 168/77 155/73   BP Location: Right arm Left arm Left arm   Patient Position: Sitting Sitting Standing   Cuff Size: Large Adult Large Adult Large Adult   Pulse: 80     Temp: 97.3 °F (36.3 °C)     TempSrc: Temporal     SpO2: 95%     Weight: 98.2 kg (216 lb 9.6 oz)  98.2 kg (216 lb 9.6 oz)   Height:   167.6 cm (65.98\")     Body mass index is 34.98 kg/m².    Current Outpatient Medications on File Prior to Visit   Medication Sig Dispense Refill   • aspirin 81 MG EC tablet Take 81 mg by mouth 3 (Three) Times a Week.     • atorvastatin (LIPITOR) 20 MG tablet Take 1 tablet by mouth once daily 30 tablet 11   • cyclobenzaprine (FLEXERIL) 10 MG tablet Take 10 mg by mouth 2 (Two) Times a Day As Needed.     • dilTIAZem (TIAZAC) 300 MG 24 hr capsule Take 1 capsule by mouth Daily. 90 capsule 3   • fenofibrate (TRICOR) 145 " MG tablet Take 1 tablet by mouth once daily 90 tablet 3   • glyburide (DIAbeta) 5 MG tablet Take 2 tablets by mouth twice daily 120 tablet 3   • ibuprofen (ADVIL,MOTRIN) 800 MG tablet Take 800 mg by mouth 3 (Three) Times a Day As Needed.     • Jardiance 25 MG tablet Take 1 tablet by mouth once daily 30 tablet 11   • lidocaine (XYLOCAINE) 5 % ointment USE OINTMENT EXTERNALLY THREE TIMES A DAY FOR 30 DAYS AS NEEDED  0   • losartan (COZAAR) 100 MG tablet Take 1 tablet by mouth Daily. 90 tablet 3   • LYRICA 100 MG capsule Take 100 mg by mouth 2 (Two) Times a Day As Needed (patient states only takes as needed).  0   • metFORMIN ER (GLUCOPHAGE-XR) 500 MG 24 hr tablet Take 2 tablets by mouth 2 (Two) Times a Day. 180 tablet 4   • oxyCODONE (ROXICODONE) 10 MG tablet Take 10 mg by mouth Every 6 (Six) Hours As Needed.     • Ozempic, 1 MG/DOSE, 4 MG/3ML solution pen-injector INJECT 1MG SUBCUTANEOUSLY UNDER THE SKIN INTO THE APPROPRIATE AREA AS DIRECTED ONCE A WEEK 3 mL 11   • [DISCONTINUED] hydroCHLOROthiazide (MICROZIDE) 12.5 MG capsule Take 1 capsule by mouth once daily 90 capsule 3   • ipratropium-albuterol (DUO-NEB) 0.5-2.5 mg/3 ml nebulizer Take 3 mL by nebulization Every 4 (Four) Hours As Needed for Wheezing. 360 mL 3   • metoprolol tartrate (LOPRESSOR) 25 MG tablet Take 25 mg by mouth 2 (Two) Times a Day.     • mupirocin (BACTROBAN) 2 % ointment Apply small amount inside each nostril 2x daily for the 5 days prior to surgery..     • tadalafil (Cialis) 5 MG tablet Take 1 tablet by mouth Daily As Needed for Erectile Dysfunction. 90 tablet 4     No current facility-administered medications on file prior to visit.       The following portions of the patient's history were reviewed and updated as appropriate: allergies, current medications, past family history, past medical history, past social history, past surgical history and problem list.    Review of Systems   Constitutional: Negative for fever.   HENT: Positive for  congestion and sinus pressure.    Respiratory: Positive for cough.        Objective   Physical Exam  Vitals reviewed.   Constitutional:       General: He is not in acute distress.     Appearance: He is well-developed. He is obese. He is not ill-appearing or toxic-appearing.   HENT:      Head: Normocephalic and atraumatic.      Right Ear: Tympanic membrane, ear canal and external ear normal.      Left Ear: Tympanic membrane, ear canal and external ear normal.      Nose: Nose normal.   Eyes:      Extraocular Movements: Extraocular movements intact.      Conjunctiva/sclera: Conjunctivae normal.      Pupils: Pupils are equal, round, and reactive to light.   Cardiovascular:      Rate and Rhythm: Normal rate and regular rhythm.      Heart sounds: Normal heart sounds.   Pulmonary:      Effort: Pulmonary effort is normal.      Breath sounds: No wheezing, rhonchi or rales.      Comments: Decreased breath sounds bilaterally  Abdominal:      General: Bowel sounds are normal. There is no distension.      Palpations: Abdomen is soft. There is no mass.      Tenderness: There is no abdominal tenderness.   Musculoskeletal:         General: Normal range of motion.      Cervical back: Neck supple.   Skin:     General: Skin is warm.   Neurological:      General: No focal deficit present.      Mental Status: He is alert and oriented to person, place, and time.   Psychiatric:         Mood and Affect: Mood normal.         Behavior: Behavior normal.       PHQ-9 Total Score:      Assessment/Plan   Diagnoses and all orders for this visit:    1. Cough (Primary)  Comments:  Cough for 4 days no fever sputum is creamy in the dark at times.  Started in the head moved into the throat and now down into the chest.  Orders:  -     COVID-19,APTIMA PANTHER(FLOR),[facility], NP/OP SWAB IN UTM/VTM/SALINE TRANSPORT MEDIA,24 HR TAT - Swab, Nasopharynx; Future  -     COVID-19,APTIMA PANTHER(FLOR),[facility], NP/OP SWAB IN UTM/VTM/SALINE TRANSPORT MEDIA,24 HR TAT -  Swab, Nasopharynx    2. Essential hypertension  -     Potassium    3. Class 1 obesity due to excess calories with serious comorbidity and body mass index (BMI) of 34.0 to 34.9 in adult    Other orders  -     amoxicillin (AMOXIL) 875 MG tablet; Take 1 tablet by mouth 2 (Two) Times a Day.  Dispense: 20 tablet; Refill: 0  -     hydroCHLOROthiazide (MICROZIDE) 12.5 MG capsule; Take 1 capsule by mouth Daily.  Dispense: 90 capsule; Refill: 0        Patient Instructions   Check blood pressure cuff for accuracy and send 10 blood pressures over 2 weeks.  Watch sodium, alcohol and weight

## 2021-12-07 ENCOUNTER — TELEPHONE (OUTPATIENT)
Dept: FAMILY MEDICINE CLINIC | Facility: CLINIC | Age: 61
End: 2021-12-07

## 2021-12-07 LAB — SARS-COV-2 ORF1AB RESP QL NAA+PROBE: NOT DETECTED

## 2021-12-07 NOTE — TELEPHONE ENCOUNTER
HUB TO READ  ----- Message from Kavitha Teran MD sent at 12/7/2021  7:28 AM EST -----  Covid test was negative.  If you have been exposed to Covid or have symptoms of Covid, continue to quarantine for 10 days or until free of fever without meds for 3 days. Consider getting second test toward end of that time before resuming usual activities-call if concern    Potassium is lower limits of normal.  Needs to increase in diet or I can send small dose for him to take daily-let me know

## 2021-12-07 NOTE — PROGRESS NOTES
Covid test was negative.  If you have been exposed to Covid or have symptoms of Covid, continue to quarantine for 10 days or until free of fever without meds for 3 days. Consider getting second test toward end of that time before resuming usual activities-call if concern    Potassium is lower limits of normal.  Needs to increase in diet or I can send small dose for him to take daily-let me know

## 2022-01-18 NOTE — PROGRESS NOTES
Date of Office Visit: 2022  Encounter Provider: Dr. Prashanth Parikh  Place of Service: [facility] CARDIOLOGY [place]  Patient Name: Dawson Lomax  :1960  Kavitha Teran MD    Chief Complaint   Patient presents with   • Coronary Artery Disease     6 month follow up   • Hypertension   • Hyperlipidemia   • Chest Pain     History of Present Illness    I am pleased to see  in my office today as a follow-up.     As you know, patient is 61-year-old white gentleman whose past medical history is significant for hypertension, diabetes mellitus, hyperlipidemia, who came today for follow-up     In , patient was admitted with the symptom of chest pain.  Patient underwent cardiac catheterization which showed nonobstructive CAD.  In 2017, patient underwent stress test again with chest pain.  Patient underwent stress test which showed possible inferior wall ischemia.  Repeat cardiac catheterization showed nonobstructive CAD.  Medical  treatment was recommended.    In 2019, patient was presented to me for symptom of left-sided chest pain.  At that time, patient also had CAT scan of chest which showed calcification of coronary arteries.  I recommended to proceed with stress test but patient did not have stress test.    In 2020, patient underwent stress test which was negative for ischemia or myocardial infarction.  Echocardiogram showed normal left ventricle size and function with EF of 55 to 60%.  Mild mitral regurgitation was noted.    Since the previous visit, patient is overall doing well.  Patient has had heart rate his granddaughter and patient is raising it.  Patient denies any significant chest pain.  Occasional noncardiac pain is reported.  Patient does have shortness of breath.  Patient denies any orthopnea, PND, syncope or presyncope.  No leg edema noted.    EKG showed normal sinus rhythm.  No ST changes noted.    At this stage patient is doing fairly well  from cardiovascular standpoint.  Patient denies any symptom of angina pectoris or congestive heart failure.  Blood pressure is very well controlled.  Weight loss is emphasized..  Diet modification discussed        Past Medical History:   Diagnosis Date   • B12 deficiency 2/2/2015   • Broken finger     broken middle finger   • Coronary artery disease    • Diabetes mellitus (HCC)    • Gout    • Heart attack (HCC) 2017   • Heart murmur    • Hx of migraines    • Hyperlipidemia    • Hypertension    • Plantar fasciitis, left 1/9/2017         Past Surgical History:   Procedure Laterality Date   • ANKLE SURGERY Left 2001   • CARDIAC CATHETERIZATION  2017   • CARPAL TUNNEL RELEASE Bilateral    • CATARACT EXTRACTION  2015   • CERVICAL SPINE SURGERY     • HERNIA REPAIR     • OTHER SURGICAL HISTORY  2017    MI with Stent 2010, 2017    • SPLENECTOMY             Current Outpatient Medications:   •  aspirin 81 MG EC tablet, Take 81 mg by mouth 3 (Three) Times a Week., Disp: , Rfl:   •  atorvastatin (LIPITOR) 20 MG tablet, Take 1 tablet by mouth once daily, Disp: 30 tablet, Rfl: 11  •  cyclobenzaprine (FLEXERIL) 10 MG tablet, Take 10 mg by mouth 2 (Two) Times a Day As Needed., Disp: , Rfl:   •  dilTIAZem (TIAZAC) 300 MG 24 hr capsule, Take 1 capsule by mouth Daily., Disp: 90 capsule, Rfl: 3  •  fenofibrate (TRICOR) 145 MG tablet, Take 1 tablet by mouth once daily, Disp: 90 tablet, Rfl: 3  •  glyburide (DIAbeta) 5 MG tablet, Take 2 tablets by mouth twice daily, Disp: 120 tablet, Rfl: 3  •  hydroCHLOROthiazide (MICROZIDE) 12.5 MG capsule, Take 1 capsule by mouth Daily., Disp: 90 capsule, Rfl: 0  •  ibuprofen (ADVIL,MOTRIN) 800 MG tablet, Take 800 mg by mouth 3 (Three) Times a Day As Needed., Disp: , Rfl:   •  ipratropium-albuterol (DUO-NEB) 0.5-2.5 mg/3 ml nebulizer, Take 3 mL by nebulization Every 4 (Four) Hours As Needed for Wheezing., Disp: 360 mL, Rfl: 3  •  Jardiance 25 MG tablet, Take 1 tablet by mouth once daily, Disp: 30  tablet, Rfl: 11  •  lidocaine (XYLOCAINE) 5 % ointment, USE OINTMENT EXTERNALLY THREE TIMES A DAY FOR 30 DAYS AS NEEDED, Disp: , Rfl: 0  •  losartan (COZAAR) 100 MG tablet, Take 1 tablet by mouth Daily., Disp: 90 tablet, Rfl: 3  •  LYRICA 100 MG capsule, Take 100 mg by mouth 2 (Two) Times a Day As Needed (patient states only takes as needed)., Disp: , Rfl: 0  •  metFORMIN ER (GLUCOPHAGE-XR) 500 MG 24 hr tablet, Take 2 tablets by mouth 2 (Two) Times a Day., Disp: 180 tablet, Rfl: 4  •  metoprolol tartrate (LOPRESSOR) 25 MG tablet, Take 25 mg by mouth 2 (Two) Times a Day., Disp: , Rfl:   •  oxyCODONE (ROXICODONE) 10 MG tablet, Take 10 mg by mouth Every 6 (Six) Hours As Needed., Disp: , Rfl:   •  Ozempic, 1 MG/DOSE, 4 MG/3ML solution pen-injector, INJECT 1MG SUBCUTANEOUSLY UNDER THE SKIN INTO THE APPROPRIATE AREA AS DIRECTED ONCE A WEEK, Disp: 3 mL, Rfl: 11  •  tadalafil (Cialis) 5 MG tablet, Take 1 tablet by mouth Daily As Needed for Erectile Dysfunction., Disp: 90 tablet, Rfl: 4      Social History     Socioeconomic History   • Marital status:    Tobacco Use   • Smoking status: Former Smoker     Packs/day: 0.50     Types: Cigarettes     Quit date:      Years since quittin.0   • Smokeless tobacco: Never Used   Vaping Use   • Vaping Use: Never used   Substance and Sexual Activity   • Alcohol use: No   • Drug use: No   • Sexual activity: Yes     Partners: Female     Birth control/protection: None         Review of Systems   Constitutional: Negative for chills and fever.   HENT: Negative for ear discharge and nosebleeds.    Eyes: Negative for discharge and redness.   Cardiovascular: Negative for chest pain, orthopnea, palpitations, paroxysmal nocturnal dyspnea and syncope.   Respiratory: Positive for shortness of breath. Negative for cough and wheezing.    Endocrine: Negative for heat intolerance.   Skin: Negative for rash.   Musculoskeletal: Positive for arthritis and joint pain. Negative for myalgias.  "  Gastrointestinal: Negative for abdominal pain, melena, nausea and vomiting.   Genitourinary: Negative for dysuria and hematuria.   Neurological: Negative for dizziness, light-headedness, numbness and tremors.   Psychiatric/Behavioral: Negative for depression. The patient is not nervous/anxious.        Procedures      ECG 12 Lead    Date/Time: 1/19/2022 5:13 PM  Performed by: Prashanth Parikh MD  Authorized by: Prashanth Parikh MD   Comparison: compared with previous ECG   Similar to previous ECG  Rhythm: sinus rhythm    Clinical impression: normal ECG            ECG 12 Lead    (Results Pending)           Objective:    /84   Pulse 73   Ht 167.6 cm (65.98\")   Wt 98 kg (216 lb)   BMI 34.88 kg/m²         Constitutional:       Appearance: Well-developed.   Eyes:      General: No scleral icterus.        Right eye: No discharge.   HENT:      Head: Normocephalic and atraumatic.   Neck:      Thyroid: No thyromegaly.      Lymphadenopathy: No cervical adenopathy.   Pulmonary:      Effort: Pulmonary effort is normal. No respiratory distress.      Breath sounds: Normal breath sounds. No wheezing. No rales.   Cardiovascular:      Normal rate. Regular rhythm.      No gallop.   Abdominal:      Tenderness: There is no abdominal tenderness.   Skin:     Findings: No erythema or rash.   Neurological:      Mental Status: Alert and oriented to person, place, and time.             Assessment:       Diagnosis Plan   1. Coronary artery disease involving native coronary artery of native heart without angina pectoris  ECG 12 Lead   2. Essential hypertension  ECG 12 Lead   3. Mixed hyperlipidemia  ECG 12 Lead   4. Heart murmur  ECG 12 Lead   5. Bradycardia, sinus  ECG 12 Lead   6. Type 2 diabetes mellitus with hyperglycemia, without long-term current use of insulin (HCC)  ECG 12 Lead            Plan:       MDM:    1.  CAD:    Patient had nonobstructive CAD.  Risk factor modification is recommended.    2.  Hypertension:    Blood pressure " is slightly high but blood pressure numbers at home are stable blood pressure monitoring is recommended    3.  Hyperlipidemia:    Patient is on Lipitor.  Recent blood work showed LDL of 36 which is desirable.    4.  Diabetes mellitus:    Patient is on metformin.  Diet modification and weight loss emphasized.  Increase aerobic activity.

## 2022-01-19 ENCOUNTER — OFFICE VISIT (OUTPATIENT)
Dept: CARDIOLOGY | Facility: CLINIC | Age: 62
End: 2022-01-19

## 2022-01-19 VITALS
DIASTOLIC BLOOD PRESSURE: 84 MMHG | BODY MASS INDEX: 34.72 KG/M2 | HEIGHT: 66 IN | HEART RATE: 73 BPM | SYSTOLIC BLOOD PRESSURE: 144 MMHG | WEIGHT: 216 LBS

## 2022-01-19 DIAGNOSIS — R00.1 BRADYCARDIA, SINUS: ICD-10-CM

## 2022-01-19 DIAGNOSIS — I10 ESSENTIAL HYPERTENSION: ICD-10-CM

## 2022-01-19 DIAGNOSIS — E78.2 MIXED HYPERLIPIDEMIA: ICD-10-CM

## 2022-01-19 DIAGNOSIS — I25.10 CORONARY ARTERY DISEASE INVOLVING NATIVE CORONARY ARTERY OF NATIVE HEART WITHOUT ANGINA PECTORIS: Primary | ICD-10-CM

## 2022-01-19 DIAGNOSIS — R01.1 HEART MURMUR: ICD-10-CM

## 2022-01-19 DIAGNOSIS — E11.65 TYPE 2 DIABETES MELLITUS WITH HYPERGLYCEMIA, WITHOUT LONG-TERM CURRENT USE OF INSULIN: ICD-10-CM

## 2022-01-19 PROCEDURE — 99214 OFFICE O/P EST MOD 30 MIN: CPT | Performed by: INTERNAL MEDICINE

## 2022-01-19 PROCEDURE — 93000 ELECTROCARDIOGRAM COMPLETE: CPT | Performed by: INTERNAL MEDICINE

## 2022-01-21 ENCOUNTER — TELEPHONE (OUTPATIENT)
Dept: ENDOCRINOLOGY | Facility: CLINIC | Age: 62
End: 2022-01-21

## 2022-01-21 RX ORDER — PRAVASTATIN SODIUM 40 MG
40 TABLET ORAL DAILY
Qty: 30 TABLET | Refills: 5 | Status: SHIPPED | OUTPATIENT
Start: 2022-01-21 | End: 2022-08-18

## 2022-01-28 ENCOUNTER — TELEPHONE (OUTPATIENT)
Dept: FAMILY MEDICINE CLINIC | Facility: CLINIC | Age: 62
End: 2022-01-28

## 2022-01-28 NOTE — TELEPHONE ENCOUNTER
Caller: Dawson Lomax    Relationship to patient: Self    Best call back number: [phone]    Date of positive COVID19 test: 1-28-22        COVID19 symptoms: CHEST CONGESTION    Additional information or concerns: PATIENT IS ASKING WHAT HE NEEDS TO DO TO TREAT SYMPTOMS   PLEASE ADVISE

## 2022-01-28 NOTE — TELEPHONE ENCOUNTER
Patient called and is Quarantining.  He was informed about monoclonal antibody treatment and presently has declined it.  Will get Oximeter and follow his oxygen and his wife's oxygen as she has it as well.  Granddaughter's pediatrician has been notified as well.  He will call office over weekend if has concerns

## 2022-02-01 ENCOUNTER — TELEPHONE (OUTPATIENT)
Dept: FAMILY MEDICINE CLINIC | Facility: CLINIC | Age: 62
End: 2022-02-01

## 2022-02-01 NOTE — TELEPHONE ENCOUNTER
Caller: Dawson Lomax    Relationship to patient: Self    Best call back number: [phone]    Patient is needing: PATIENT IS REQUESTING A PRINT OUT OF ALL OF HIS MEDICATIONS THAT HE IS ON OR HAS TAKEN. HE WOULD LIKE TO PICK IT UP. PLEASE REACH OUT WHEN WE HAVE THIS AND HE CAN PICK IT UP

## 2022-02-04 RX ORDER — METFORMIN HYDROCHLORIDE 500 MG/1
TABLET, EXTENDED RELEASE ORAL
Qty: 180 TABLET | Refills: 0 | Status: SHIPPED | OUTPATIENT
Start: 2022-02-04 | End: 2022-04-14 | Stop reason: SDUPTHER

## 2022-03-09 ENCOUNTER — TRANSCRIBE ORDERS (OUTPATIENT)
Dept: PHYSICAL THERAPY | Facility: CLINIC | Age: 62
End: 2022-03-09

## 2022-03-09 DIAGNOSIS — M54.17 LUMBOSACRAL RADICULOPATHY: ICD-10-CM

## 2022-03-09 DIAGNOSIS — M47.816 LUMBAR SPONDYLOSIS: ICD-10-CM

## 2022-03-09 DIAGNOSIS — M54.16 LUMBAR RADICULOPATHY: Primary | ICD-10-CM

## 2022-03-17 ENCOUNTER — TELEPHONE (OUTPATIENT)
Dept: FAMILY MEDICINE CLINIC | Facility: CLINIC | Age: 62
End: 2022-03-17

## 2022-03-17 DIAGNOSIS — I10 ESSENTIAL HYPERTENSION: Primary | ICD-10-CM

## 2022-03-17 DIAGNOSIS — E78.2 MIXED HYPERLIPIDEMIA: ICD-10-CM

## 2022-03-17 DIAGNOSIS — E11.65 TYPE 2 DIABETES MELLITUS WITH HYPERGLYCEMIA, WITHOUT LONG-TERM CURRENT USE OF INSULIN: ICD-10-CM

## 2022-03-17 NOTE — TELEPHONE ENCOUNTER
Patient coming in tomorrow for annual at 1:30. Would like to come in first thing in the morning for labs. Orders needed.

## 2022-03-28 RX ORDER — GLYBURIDE 5 MG/1
TABLET ORAL
Qty: 120 TABLET | Refills: 0 | Status: SHIPPED | OUTPATIENT
Start: 2022-03-28 | End: 2022-05-31

## 2022-03-30 ENCOUNTER — TREATMENT (OUTPATIENT)
Dept: PHYSICAL THERAPY | Facility: CLINIC | Age: 62
End: 2022-03-30

## 2022-03-30 DIAGNOSIS — M54.16 RADICULOPATHY, LUMBAR REGION: Primary | ICD-10-CM

## 2022-03-30 PROCEDURE — 97162 PT EVAL MOD COMPLEX 30 MIN: CPT | Performed by: PHYSICAL THERAPIST

## 2022-03-30 NOTE — PROGRESS NOTES
Physical Therapy Initial Evaluation and Plan of Care    Patient: Dawson Lomax   : 1960  Diagnosis/ICD-10 Code:  Radiculopathy, lumbar region [M54.16]  Referring practitioner: Jean Joyce MD  Date of Initial Visit: 3/30/2022  Today's Date: 3/30/2022  Patient seen for 1 sessions           Subjective Questionnaire: Oswestry:  20%      Subjective Evaluation    History of Present Illness  Mechanism of injury: Patient presents to physical therapy with cc of low back and left hip pain.  Patient reports that these symptoms have been present for many years and he has undergone many treatments for this issue.  Reports getting pain injections and pain medications to manage pain.  Patient reports that he works for [employer] and spends many hours sitting then on his feet when working.  Reports that pain in fairly constant and is located in low back, left groin and radiates into left knee.     Pain  Current pain ratin  Quality: throbbing  Relieving factors: medications  Aggravating factors: prolonged positioning, standing, squatting and ambulation  Progression: worsening    Diagnostic Tests  X-ray: abnormal    Treatments  Previous treatment: injection treatment  Patient Goals  Patient goals for therapy: decreased pain, increased motion and increased strength             Objective          Active Range of Motion     Additional Active Range of Motion Details  Lumbar AROM:     Flexion 50% limited with back pain  R/L sidebending wnl  Extension 75% limited with back pain    Passive Range of Motion   Left Hip   External rotation (90/90): 30 degrees with pain  Internal rotation (90/90): 0 degrees with pain    Right Hip   External rotation (90/90): 30 degrees with pain  Internal rotation (90/90): 0 degrees with pain    Strength/Myotome Testing     Left Hip   Planes of Motion   Flexion: 4  External rotation: 4+  Internal rotation: 4+    Right Hip   Planes of Motion   Flexion: 4  External rotation:  4+  Internal rotation: 4+    Left Knee   Flexion: 4+    Right Knee   Flexion: 4    Left Ankle/Foot   Dorsiflexion: 5    Right Ankle/Foot   Dorsiflexion: 5    Additional Strength Details  Pain in both hips and lower back with resisted testing of all muscle groups    Tests     Lumbar     Left   Positive quadrant.           Assessment & Plan     Assessment  Impairments: abnormal or restricted ROM, impaired physical strength, lacks appropriate home exercise program and pain with function  Functional Limitations: walking, uncomfortable because of pain and standing  Assessment details: Patient presents to physical therapy with s/s congruent with MD diagnosis of lumbar radiculopathy.  Patient demonstrates limited AROM in lumbar spine, weakness in BLE's, and pain with movement.  Patient is appropriate for PT intervention in order to address these deficits so that he may complete ADL's with less pain/limitation.   Prognosis: fair    Goals  Plan Goals: In two weeks, patient will report at least 25% reduction in pain level.    In two weeks, patient will demonstrate at least 25% improvement in AROM in  lumbar spine.     In four weeks, patient will demonstrate lumbar AROM WFL without pain level over 2/10.  In four weeks, patient will demonstrate decreased perceived disability by decreasing score on Oswestry by at least 12%.  In four weeks, patient will report completing ADL's without pain level over 2/10.     Plan  Therapy options: will be seen for skilled therapy services  Planned modality interventions: TENS, thermotherapy (hydrocollator packs) and cryotherapy  Planned therapy interventions: manual therapy, neuromuscular re-education, soft tissue mobilization, strengthening, stretching, therapeutic activities, home exercise program and abdominal trunk stabilization  Duration in visits: 12  Plan details: 1-2 times per week        Manual Therapy:         mins  83409;  Therapeutic Exercise:    5     mins  42342;     Neuromuscular  Mayela:        mins  40403;    Therapeutic Activity:          mins  83808;     Gait Training:           mins  90908;     Ultrasound:          mins  42900;    Electrical Stimulation:         mins  53356 ( );  Dry Needling          mins self-pay    Timed Treatment:   5   mins   Total Treatment:     45   mins    PT SIGNATURE: Hill Villavicencio, LUIS   DATE TREATMENT INITIATED: 3/30/2022    Initial Certification  Certification Period: 6/28/2022  I certify that the therapy services are furnished while this patient is under my care.  The services outlined above are required by this patient, and will be reviewed every 90 days.     PHYSICIAN: Jean Joyce MD      DATE:     Please sign and return via fax to [phone].. Thank you, [facility].

## 2022-04-05 RX ORDER — IBUPROFEN 600 MG/1
TABLET ORAL
COMMUNITY
Start: 2022-02-08

## 2022-04-07 RX ORDER — HYDROCHLOROTHIAZIDE 12.5 MG/1
CAPSULE, GELATIN COATED ORAL
Qty: 90 CAPSULE | Refills: 0 | Status: SHIPPED | OUTPATIENT
Start: 2022-04-07 | End: 2022-07-05

## 2022-04-14 ENCOUNTER — OFFICE VISIT (OUTPATIENT)
Dept: ENDOCRINOLOGY | Facility: CLINIC | Age: 62
End: 2022-04-14

## 2022-04-14 VITALS
BODY MASS INDEX: 34.23 KG/M2 | SYSTOLIC BLOOD PRESSURE: 138 MMHG | OXYGEN SATURATION: 95 % | HEART RATE: 72 BPM | DIASTOLIC BLOOD PRESSURE: 70 MMHG | HEIGHT: 66 IN | TEMPERATURE: 97.3 F | WEIGHT: 213 LBS

## 2022-04-14 DIAGNOSIS — E78.2 MIXED HYPERLIPIDEMIA: ICD-10-CM

## 2022-04-14 DIAGNOSIS — N52.9 IMPOTENCE: ICD-10-CM

## 2022-04-14 DIAGNOSIS — I10 ESSENTIAL HYPERTENSION: ICD-10-CM

## 2022-04-14 DIAGNOSIS — E11.65 TYPE 2 DIABETES MELLITUS WITH HYPERGLYCEMIA, WITHOUT LONG-TERM CURRENT USE OF INSULIN: Primary | ICD-10-CM

## 2022-04-14 LAB — GLUCOSE BLDC GLUCOMTR-MCNC: 126 MG/DL (ref 70–105)

## 2022-04-14 PROCEDURE — 82962 GLUCOSE BLOOD TEST: CPT | Performed by: INTERNAL MEDICINE

## 2022-04-14 PROCEDURE — 99214 OFFICE O/P EST MOD 30 MIN: CPT | Performed by: INTERNAL MEDICINE

## 2022-04-14 RX ORDER — TADALAFIL 10 MG/1
TABLET ORAL
Qty: 20 TABLET | Refills: 5 | Status: SHIPPED | OUTPATIENT
Start: 2022-04-14 | End: 2022-10-13

## 2022-04-14 RX ORDER — METFORMIN HYDROCHLORIDE 500 MG/1
1000 TABLET, EXTENDED RELEASE ORAL 2 TIMES DAILY
Qty: 180 TABLET | Refills: 6 | Status: SHIPPED | OUTPATIENT
Start: 2022-04-14

## 2022-04-14 NOTE — PATIENT INSTRUCTIONS
Continue current medication  Increase Cialis to 10 mg p.o. daily as needed for erectile dysfunction  Get your labs done in the next few days fasting  Always keep glucose source in case of low blood sugar  Annual eye exam and flu vaccine

## 2022-04-14 NOTE — PROGRESS NOTES
Endocrine Progress Note Outpatient     Patient Care Team:  Kavitha Teran MD as PCP - General  Prashanth Parikh MD as Consulting Physician (Cardiology)  Sita Yusuf MD as Consulting Physician (Hematology and Oncology)  Miki Man MD as Consulting Physician (Endocrinology)    Chief Complaint: Follow-up type 2 diabetes    HPI: 61-year-old male with history of type 2 diabetes, hypertension, hyperlipidemia and obesity is here for follow-up.    For type 2 diabetes he is currently on metformin 1000 g twice a day, Jardiance 25 mill grams once a day, glyburide 10 mill grams twice a day, Ozempic 1.0 mg subcu once a week. No BS records. He is trying to work on his diet and activity.  He tells me his blood sugars at home are usually less than 140.  He was last seen back in 2020.  No recent labs.    Hypertension: Well-controlled.    Hyperlipidemia: He is currently on atorvastatin and fenofibrate.    Obesity: He is trying to work on diet.     Impotence: Testosterone level normal.     Past Medical History:   Diagnosis Date   • B12 deficiency 2015   • Broken finger     broken middle finger   • Coronary artery disease    • Diabetes mellitus (HCC)    • Gout    • Heart attack (HCC)    • Heart murmur    • Hx of migraines    • Hyperlipidemia    • Hypertension    • Plantar fasciitis, left 2017       Social History     Socioeconomic History   • Marital status:      Spouse name: Yesi   • Number of children: 1   • Years of education: 12   Tobacco Use   • Smoking status: Former Smoker     Packs/day: 0.50     Types: Cigarettes     Quit date:      Years since quittin.3   • Smokeless tobacco: Never Used   Vaping Use   • Vaping Use: Never used   Substance and Sexual Activity   • Alcohol use: No   • Drug use: No   • Sexual activity: Yes     Partners: Female     Birth control/protection: None       Family History   Problem Relation Age of Onset   • Diabetes Paternal Grandmother    •  Cancer Other        Allergies   Allergen Reactions   • Gabapentin Dizziness       ROS:   Constitutional:  Denies fatigue, tiredness.    Eyes:  Denies change in visual acuity   HENT:  Denies nasal congestion or sore throat   Respiratory: denies cough, shortness of breath.   Cardiovascular:  denies chest pain, edema   GI:  Denies abdominal pain, nausea, vomiting.   Musculoskeletal:  Denies back pain or joint pain   Integument:  Denies dry skin and rash   Neurologic:  Denies headache, focal weakness or sensory changes   Endocrine:  Denies polyuria or polydipsia   Psychiatric:  Denies depression or anxiety      Vitals:    04/14/22 0838   BP: 138/70   Pulse: 72   Temp: 97.3 °F (36.3 °C)   SpO2: 95%     BMI: 34.4  Physical Exam:  GEN: NAD, conversant, obese  EYES: EOMI, PERRL, no conjunctival erythema  NECK: no thyromegaly, full ROM   CV: RRR, no murmurs/rubs/gallops, no peripheral edema  LUNG: CTAB, no wheezes/rales/ronchi  SKIN: no rashes, no acanthosis  MSK: no deformities, full ROM of all extremities  NEURO: no tremors, DTR normal  PSYCH: AOX3, appropriate mood, affect normal      Results Review:     I reviewed the patient's new clinical results.    Lab Results   Component Value Date    HGBA1C 7.3 (H) 05/07/2021    HGBA1C 8.0 (H) 10/29/2020    HGBA1C 7.9 (H) 09/15/2020      Lab Results   Component Value Date    GLUCOSE 166 (H) 05/07/2021    BUN 18 05/07/2021    CREATININE 0.84 05/07/2021    EGFRIFNONA 93 05/07/2021    BCR 21.4 05/07/2021    K 3.5 12/06/2021    CO2 23.5 05/07/2021    CALCIUM 8.9 05/07/2021    ALBUMIN 4.10 05/07/2021    LABIL2 1.3 08/24/2020    AST 16 05/07/2021    ALT 22 05/07/2021    CHOL 94 05/07/2021    TRIG 195 (H) 05/07/2021    LDL 35 05/07/2021    HDL 27 (L) 05/07/2021     Lab Results   Component Value Date    TSH 2.080 07/13/2021         Medication Review: Reviewed.       Current Outpatient Medications:   •  aspirin 81 MG EC tablet, Take 81 mg by mouth 3 (Three) Times a Week., Disp: , Rfl:   •   cyclobenzaprine (FLEXERIL) 10 MG tablet, Take 10 mg by mouth 2 (Two) Times a Day As Needed., Disp: , Rfl:   •  dilTIAZem (TIAZAC) 300 MG 24 hr capsule, Take 1 capsule by mouth Daily., Disp: 90 capsule, Rfl: 3  •  fenofibrate (TRICOR) 145 MG tablet, Take 1 tablet by mouth once daily, Disp: 90 tablet, Rfl: 3  •  glyburide (DIAbeta) 5 MG tablet, Take 2 tablets by mouth twice daily, Disp: 120 tablet, Rfl: 0  •  hydroCHLOROthiazide (MICROZIDE) 12.5 MG capsule, Take 1 capsule by mouth once daily, Disp: 90 capsule, Rfl: 0  •  ibuprofen (ADVIL,MOTRIN) 600 MG tablet, TAKE 1 TABLET (600 MG) BY MOUTH ONCE DAILY FOR 30 DAYS, Disp: , Rfl:   •  ibuprofen (ADVIL,MOTRIN) 800 MG tablet, Take 800 mg by mouth 3 (Three) Times a Day As Needed., Disp: , Rfl:   •  ipratropium-albuterol (DUO-NEB) 0.5-2.5 mg/3 ml nebulizer, Take 3 mL by nebulization Every 4 (Four) Hours As Needed for Wheezing., Disp: 360 mL, Rfl: 3  •  Jardiance 25 MG tablet, Take 1 tablet by mouth once daily, Disp: 30 tablet, Rfl: 11  •  lidocaine (XYLOCAINE) 5 % ointment, USE OINTMENT EXTERNALLY THREE TIMES A DAY FOR 30 DAYS AS NEEDED, Disp: , Rfl: 0  •  losartan (COZAAR) 100 MG tablet, Take 1 tablet by mouth Daily., Disp: 90 tablet, Rfl: 3  •  LYRICA 100 MG capsule, Take 100 mg by mouth 2 (Two) Times a Day As Needed (patient states only takes as needed)., Disp: , Rfl: 0  •  metFORMIN ER (GLUCOPHAGE-XR) 500 MG 24 hr tablet, Take 2 tablets by mouth twice daily, Disp: 180 tablet, Rfl: 0  •  metoprolol tartrate (LOPRESSOR) 25 MG tablet, Take 25 mg by mouth 2 (Two) Times a Day., Disp: , Rfl:   •  oxyCODONE (ROXICODONE) 10 MG tablet, Take 10 mg by mouth Every 6 (Six) Hours As Needed., Disp: , Rfl:   •  Ozempic, 1 MG/DOSE, 4 MG/3ML solution pen-injector, INJECT 1MG SUBCUTANEOUSLY UNDER THE SKIN INTO THE APPROPRIATE AREA AS DIRECTED ONCE A WEEK, Disp: 3 mL, Rfl: 11  •  pravastatin (Pravachol) 40 MG tablet, Take 1 tablet by mouth Daily. Per Ins prefers Pravastatin over  Atorvastatin., Disp: 30 tablet, Rfl: 5  •  tadalafil (Cialis) 5 MG tablet, Take 1 tablet by mouth Daily As Needed for Erectile Dysfunction., Disp: 90 tablet, Rfl: 4      Assessment/Plan   1.  Diabetes mellitus type 2: Blood sugars at home according to the patient are doing well, no recent labs.  I will continue his metformin with Jardiance, glyburide and Ozempic for now.  We will check A1c.  He is advised to continue to work on diet and activity and always keep glucose source in case of low blood sugars.    2.  Hypertension: Well-controlled, continue current medication    3.  Hyperlipidemia: On atorvastatin and fenofibrate, follow lipid panel.    4.  Obesity: Advised to continue to work on diet and activity.    5.  Impotence: Testosterone level was normal.  Cialis 5 mg does not work.  We will try Cialis 10 mg as needed.          Miki Man MD FACE.

## 2022-04-19 ENCOUNTER — APPOINTMENT (OUTPATIENT)
Dept: LAB | Facility: HOSPITAL | Age: 62
End: 2022-04-19

## 2022-05-25 ENCOUNTER — TELEPHONE (OUTPATIENT)
Dept: FAMILY MEDICINE CLINIC | Facility: CLINIC | Age: 62
End: 2022-05-25

## 2022-05-25 NOTE — TELEPHONE ENCOUNTER
SPOKE TO PATIENT. HE WILL HAVE [facility] DRAWN [place] [facility]

## 2022-05-25 NOTE — TELEPHONE ENCOUNTER
Hub staff attempted to follow warm transfer process and was unsuccessful     Caller: Dawson Lomax    Relationship to patient: Self    Best call back number:[phone]    Patient is needing: WANT TO SCHEDULE FOR LAB APPOINTMENT

## 2022-05-26 ENCOUNTER — OFFICE VISIT (OUTPATIENT)
Dept: ONCOLOGY | Facility: CLINIC | Age: 62
End: 2022-05-26

## 2022-05-26 ENCOUNTER — LAB (OUTPATIENT)
Dept: LAB | Facility: HOSPITAL | Age: 62
End: 2022-05-26

## 2022-05-26 ENCOUNTER — TELEPHONE (OUTPATIENT)
Dept: FAMILY MEDICINE CLINIC | Facility: CLINIC | Age: 62
End: 2022-05-26

## 2022-05-26 VITALS
RESPIRATION RATE: 18 BRPM | BODY MASS INDEX: 34.07 KG/M2 | TEMPERATURE: 97.1 F | WEIGHT: 212 LBS | HEART RATE: 77 BPM | HEIGHT: 66 IN | DIASTOLIC BLOOD PRESSURE: 79 MMHG | SYSTOLIC BLOOD PRESSURE: 161 MMHG

## 2022-05-26 DIAGNOSIS — E11.65 TYPE 2 DIABETES MELLITUS WITH HYPERGLYCEMIA, WITHOUT LONG-TERM CURRENT USE OF INSULIN: Primary | ICD-10-CM

## 2022-05-26 DIAGNOSIS — I10 ESSENTIAL HYPERTENSION: ICD-10-CM

## 2022-05-26 DIAGNOSIS — D72.829 LEUKOCYTOSIS, UNSPECIFIED TYPE: ICD-10-CM

## 2022-05-26 DIAGNOSIS — E78.2 MIXED HYPERLIPIDEMIA: ICD-10-CM

## 2022-05-26 DIAGNOSIS — D72.829 LEUKOCYTOSIS, UNSPECIFIED TYPE: Primary | ICD-10-CM

## 2022-05-26 LAB
ALBUMIN SERPL-MCNC: 4.2 G/DL (ref 3.5–5.2)
ALBUMIN UR-MCNC: 8.6 MG/DL
ALBUMIN/GLOB SERPL: 1.2 G/DL
ALP SERPL-CCNC: 83 U/L (ref 39–117)
ALT SERPL W P-5'-P-CCNC: 23 U/L (ref 1–41)
ANION GAP SERPL CALCULATED.3IONS-SCNC: 12 MMOL/L (ref 5–15)
AST SERPL-CCNC: 24 U/L (ref 1–40)
BASOPHILS # BLD AUTO: 0.07 10*3/MM3 (ref 0–0.2)
BASOPHILS NFR BLD AUTO: 0.5 % (ref 0–1.5)
BILIRUB SERPL-MCNC: 0.5 MG/DL (ref 0–1.2)
BUN SERPL-MCNC: 17 MG/DL (ref 8–23)
BUN/CREAT SERPL: 21.5 (ref 7–25)
CALCIUM SPEC-SCNC: 9.5 MG/DL (ref 8.6–10.5)
CHLORIDE SERPL-SCNC: 102 MMOL/L (ref 98–107)
CHOLEST SERPL-MCNC: 134 MG/DL (ref 0–200)
CO2 SERPL-SCNC: 23 MMOL/L (ref 22–29)
CREAT SERPL-MCNC: 0.79 MG/DL (ref 0.76–1.27)
CREAT UR-MCNC: 43 MG/DL
DEPRECATED RDW RBC AUTO: 45.9 FL (ref 37–54)
EGFRCR SERPLBLD CKD-EPI 2021: 101.1 ML/MIN/1.73
EOSINOPHIL # BLD AUTO: 0.23 10*3/MM3 (ref 0–0.4)
EOSINOPHIL NFR BLD AUTO: 1.6 % (ref 0.3–6.2)
ERYTHROCYTE [DISTWIDTH] IN BLOOD BY AUTOMATED COUNT: 13.4 % (ref 12.3–15.4)
GLOBULIN UR ELPH-MCNC: 3.4 GM/DL
GLUCOSE SERPL-MCNC: 190 MG/DL (ref 65–99)
HBA1C MFR BLD: 8.6 % (ref 3.5–5.6)
HCT VFR BLD AUTO: 50.4 % (ref 37.5–51)
HDLC SERPL-MCNC: 28 MG/DL (ref 40–60)
HGB BLD-MCNC: 17.2 G/DL (ref 13–17.7)
LDLC SERPL CALC-MCNC: 50 MG/DL (ref 0–100)
LDLC/HDLC SERPL: 1.11 {RATIO}
LYMPHOCYTES # BLD AUTO: 5.11 10*3/MM3 (ref 0.7–3.1)
LYMPHOCYTES NFR BLD AUTO: 35.9 % (ref 19.6–45.3)
MCH RBC QN AUTO: 32.5 PG (ref 26.6–33)
MCHC RBC AUTO-ENTMCNC: 34.1 G/DL (ref 31.5–35.7)
MCV RBC AUTO: 95.3 FL (ref 79–97)
MICROALBUMIN/CREAT UR: 200 MG/G
MONOCYTES # BLD AUTO: 1.9 10*3/MM3 (ref 0.1–0.9)
MONOCYTES NFR BLD AUTO: 13.4 % (ref 5–12)
NEUTROPHILS NFR BLD AUTO: 48.6 % (ref 42.7–76)
NEUTROPHILS NFR BLD AUTO: 6.92 10*3/MM3 (ref 1.7–7)
PLATELET # BLD AUTO: 327 10*3/MM3 (ref 140–450)
PMV BLD AUTO: 9 FL (ref 6–12)
POTASSIUM SERPL-SCNC: 3.8 MMOL/L (ref 3.5–5.2)
PROT SERPL-MCNC: 7.6 G/DL (ref 6–8.5)
RBC # BLD AUTO: 5.29 10*6/MM3 (ref 4.14–5.8)
SODIUM SERPL-SCNC: 137 MMOL/L (ref 136–145)
TRIGL SERPL-MCNC: 374 MG/DL (ref 0–150)
TSH SERPL DL<=0.05 MIU/L-ACNC: 2.35 UIU/ML (ref 0.27–4.2)
VIT B12 BLD-MCNC: 504 PG/ML (ref 211–946)
VLDLC SERPL-MCNC: 56 MG/DL (ref 5–40)
WBC NRBC COR # BLD: 14.23 10*3/MM3 (ref 3.4–10.8)

## 2022-05-26 PROCEDURE — 82570 ASSAY OF URINE CREATININE: CPT

## 2022-05-26 PROCEDURE — 82607 VITAMIN B-12: CPT

## 2022-05-26 PROCEDURE — 83036 HEMOGLOBIN GLYCOSYLATED A1C: CPT

## 2022-05-26 PROCEDURE — 80050 GENERAL HEALTH PANEL: CPT

## 2022-05-26 PROCEDURE — 99213 OFFICE O/P EST LOW 20 MIN: CPT | Performed by: INTERNAL MEDICINE

## 2022-05-26 PROCEDURE — 80061 LIPID PANEL: CPT

## 2022-05-26 PROCEDURE — 82043 UR ALBUMIN QUANTITATIVE: CPT

## 2022-05-26 NOTE — PROGRESS NOTES
Blood sugar was 190 and A1c shows that sugar is not controlled at 8.6 which has worsened.  Please call Dr. Man's office and see if he wishes to increase the Ozempic.

## 2022-05-26 NOTE — PROGRESS NOTES
Hematology/Oncology Outpatient Follow Up    PATIENT NAME:Dawson Lomax  :1960  MRN: [id number]  PRIMARY CARE PHYSICIAN: Kavitha Teran MD  REFERRING PHYSICIAN: Kavitha Teran MD    Chief Complaint   Patient presents with   • Follow-up     Other elevated white blood cell (WBC) count          HISTORY OF PRESENT ILLNESS:     This is a 59-year-old male who is here due to two to three month history of night sweats.  Patient denies any fevers or chills.  He has chest congestion for which he had a chest x-ray which was essentially clear.  He denies any other symptoms such as weight loss or fatigue symptoms.  He still has a cough which is productive of clear sputum.  He has no urinary symptoms.  He denies nausea, vomiting or diarrhea.  During his work up for the above complaint he had a CBC done on 3/27/15 which showed a white count of 14.7, hemoglobin 14.4, platelet count of 421,000.  His differentials were 48% neutrophils, 37% lymphocytes, 12% monocytes.  He has no basophilia or eosinophilia noted on his differential count.  He had mild monocytosis and also mild absolute lymphocytosis.  He is alone today for this appointment.    • 2015 - CT scan of the abdomen and pelvis which showed moderate stool and post splenectomy.  Otherwise negative.    • 2015 - Chest x-ray, essentially normal with no acute abnormalities identified.    • 4/1/15 - Patient had further testing including BCR-abl, which was negative for mutation.  His flow cytometry was negative for lymphoproliferative disease.  JORY-2 was negative.  Acute viral hepatitis panel was negative.  He had a normal B12 level, sed rate and LDH.  Uric acid was also normal and his urinalysis was negative for any infection.  His repeat CBC on the same day showed WBC count of 12.9, hemoglobin 13.8, platelet count 414,000.   • 6/10/15 - CT scan of the chest without contrast.  This did not reveal any pulmonary mass.  He has increased upper  abdominal lymph nodes, but within normal limits for size.    • 2/2/17 - Patient was admitted to the hospital for acute coronary syndrome.  Patient apparently had a myocardiac infarction.  He had one stent placed during hospitalization.  I saw him in the past for leukocytosis with negative work up.    • 2/28/17 - WBC 15.5, hemoglobin 13.7, platelet count 425,000.  Differentials were 47% neutrophils, 37% lymphocytes.  There was mild monocytosis at 12% [range 2-11].    • Labs since last visit of 3/1/17 - BCR-abl negative.  B12 (N) 541.  LDH (N) 178.  Uric acid (L) 3.8.    • 3/9/17 - Peripheral blood flow cytometry was negative.  JORY-2 was negative for mutation.    • 3/13/17 - Serum PSA 0.60.   • 2/28/18 - CT scan of the chest showed fatty infiltration of the liver.  There was a small 2 to 3 mm pleural based nodule posteriorly and laterally in the right lower lobe, stable from prior CT scan of 2017.   • 10/19/19-CT of the chest showed small subpleural 3 mm right lower lobe nodule.  Stable 4 mm subpleural right lower lobe nodule.  • 7/17/2020 patient had CT scan of the chest with a stable 3 mm lesion and 5 mm pleural-based nodules within the right lower lobe and stable 3 mm subpleural lesion as well.  These are stable since February 2018 consistent with benign.  Steatotic liver  • HPI has been reviewed        Past Medical History:   Diagnosis Date   • B12 deficiency 2/2/2015   • Broken finger     broken middle finger   • Coronary artery disease    • Diabetes mellitus (HCC)    • Gout    • Heart attack (HCC) 2017   • Heart murmur    • Hx of migraines    • Hyperlipidemia    • Hypertension    • Plantar fasciitis, left 1/9/2017       Past Surgical History:   Procedure Laterality Date   • ANKLE SURGERY Left 2001   • CARDIAC CATHETERIZATION  2017   • CARPAL TUNNEL RELEASE Bilateral    • CATARACT EXTRACTION  2015   • CERVICAL SPINE SURGERY     • HERNIA REPAIR     • OTHER SURGICAL HISTORY  2017    MI with Stent 2010, 2017    •  SPLENECTOMY           Current Outpatient Medications:   •  aspirin 81 MG EC tablet, Take 81 mg by mouth 3 (Three) Times a Week., Disp: , Rfl:   •  cyclobenzaprine (FLEXERIL) 10 MG tablet, Take 10 mg by mouth 2 (Two) Times a Day As Needed., Disp: , Rfl:   •  dilTIAZem (TIAZAC) 300 MG 24 hr capsule, Take 1 capsule by mouth Daily., Disp: 90 capsule, Rfl: 3  •  fenofibrate (TRICOR) 145 MG tablet, Take 1 tablet by mouth once daily, Disp: 90 tablet, Rfl: 3  •  glyburide (DIAbeta) 5 MG tablet, Take 2 tablets by mouth twice daily, Disp: 120 tablet, Rfl: 0  •  hydroCHLOROthiazide (MICROZIDE) 12.5 MG capsule, Take 1 capsule by mouth once daily, Disp: 90 capsule, Rfl: 0  •  ibuprofen (ADVIL,MOTRIN) 600 MG tablet, TAKE 1 TABLET (600 MG) BY MOUTH ONCE DAILY FOR 30 DAYS, Disp: , Rfl:   •  ibuprofen (ADVIL,MOTRIN) 800 MG tablet, Take 800 mg by mouth 3 (Three) Times a Day As Needed., Disp: , Rfl:   •  ipratropium-albuterol (DUO-NEB) 0.5-2.5 mg/3 ml nebulizer, Take 3 mL by nebulization Every 4 (Four) Hours As Needed for Wheezing., Disp: 360 mL, Rfl: 3  •  Jardiance 25 MG tablet, Take 1 tablet by mouth once daily, Disp: 30 tablet, Rfl: 11  •  lidocaine (XYLOCAINE) 5 % ointment, USE OINTMENT EXTERNALLY THREE TIMES A DAY FOR 30 DAYS AS NEEDED, Disp: , Rfl: 0  •  losartan (COZAAR) 100 MG tablet, Take 1 tablet by mouth Daily., Disp: 90 tablet, Rfl: 3  •  LYRICA 100 MG capsule, Take 100 mg by mouth 2 (Two) Times a Day As Needed (patient states only takes as needed)., Disp: , Rfl: 0  •  metFORMIN ER (GLUCOPHAGE-XR) 500 MG 24 hr tablet, Take 2 tablets by mouth 2 (Two) Times a Day., Disp: 180 tablet, Rfl: 6  •  metoprolol tartrate (LOPRESSOR) 25 MG tablet, Take 25 mg by mouth 2 (Two) Times a Day., Disp: , Rfl:   •  oxyCODONE (ROXICODONE) 10 MG tablet, Take 10 mg by mouth Every 6 (Six) Hours As Needed., Disp: , Rfl:   •  Ozempic, 1 MG/DOSE, 4 MG/3ML solution pen-injector, INJECT 1MG SUBCUTANEOUSLY UNDER THE SKIN INTO THE APPROPRIATE AREA AS  DIRECTED ONCE A WEEK, Disp: 3 mL, Rfl: 11  •  pravastatin (Pravachol) 40 MG tablet, Take 1 tablet by mouth Daily. Per Ins prefers Pravastatin over Atorvastatin., Disp: 30 tablet, Rfl: 5  •  tadalafil (Cialis) 10 MG tablet, Take 1 tablet p.o. as needed daily., Disp: 20 tablet, Rfl: 5    Allergies   Allergen Reactions   • Gabapentin Dizziness       Family History   Problem Relation Age of Onset   • Diabetes Paternal Grandmother    • Cancer Other        Cancer-related family history includes Cancer in an other family member.    Social History     Tobacco Use   • Smoking status: Former Smoker     Packs/day: 0.50     Types: Cigarettes     Quit date:      Years since quittin.4   • Smokeless tobacco: Never Used   Vaping Use   • Vaping Use: Never used   Substance Use Topics   • Alcohol use: No   • Drug use: No       I have reviewed and confirmed the accuracy of the patient's history: as entered by the MA/LPN/RN. Sita Yusuf MD 22     SUBJECTIVE:    The patient is here for a follow up appointment.   Patient denies any new issues    REVIEW OF SYSTEMS:     Review of Systems   Constitutional: Negative for chills and fever.   HENT: Negative for ear pain, mouth sores, nosebleeds and sore throat.    Eyes: Negative for photophobia and visual disturbance.   Respiratory: Negative for wheezing and stridor.    Cardiovascular: Negative for chest pain and palpitations.   Gastrointestinal: Negative for abdominal pain, diarrhea, nausea and vomiting.   Endocrine: Negative for cold intolerance and heat intolerance.   Genitourinary: Negative for dysuria and hematuria.   Musculoskeletal: Negative for joint swelling and neck stiffness.   Skin: Negative for color change and rash.   Neurological: Negative for seizures and syncope.   Hematological: Negative for adenopathy.        No obvious bleeding   Psychiatric/Behavioral: Negative for agitation, confusion and hallucinations.     I have reviewed and confirmed the  "accuracy of the ROS as documented by the MA/LPN/RN Sita Yusuf MD      OBJECTIVE:    Vitals:    05/26/22 1007   BP: 161/79   Pulse: 77   Resp: 18   Temp: 97.1 °F (36.2 °C)   TempSrc: Infrared   Weight: 96.2 kg (212 lb)   Height: 167.6 cm (66\")   PainSc:   7   PainLoc: Back       ECOG    (0) Fully active, able to carry on all predisease performance without restriction    Physical Exam   Constitutional: He is oriented to person, place, and time. No distress.   HENT:   Head: Normocephalic and atraumatic.   Eyes: Conjunctivae are normal. Right eye exhibits no discharge. Left eye exhibits no discharge. No scleral icterus.   Neck: No thyromegaly present.   Cardiovascular: Normal rate, regular rhythm and normal heart sounds. Exam reveals no gallop and no friction rub.   Pulmonary/Chest: Effort normal. No stridor. No respiratory distress. He has no wheezes.   Abdominal: Soft. Bowel sounds are normal. He exhibits no mass. There is no abdominal tenderness. There is no rebound and no guarding.   Musculoskeletal: Normal range of motion. No tenderness.   Lymphadenopathy:     He has no cervical adenopathy.   Neurological: He is alert and oriented to person, place, and time. He exhibits normal muscle tone.   Skin: Skin is warm. No rash noted. He is not diaphoretic. No erythema.   Psychiatric: His behavior is normal. Judgment and thought content normal.   Nursing note and vitals reviewed.    I have reexamined the patient and the results are consistent with the previously documented exam. Sita Yusuf MD     RECENT LABS    WBC   Date Value Ref Range Status   05/26/2022 14.23 (H) 3.40 - 10.80 10*3/mm3 Final     RBC   Date Value Ref Range Status   05/26/2022 5.29 4.14 - 5.80 10*6/mm3 Final     Hemoglobin   Date Value Ref Range Status   05/26/2022 17.2 13.0 - 17.7 g/dL Final   09/15/2020 15.2 13.5 - 17.5 g/dL Final     Hematocrit   Date Value Ref Range Status   05/26/2022 50.4 37.5 - 51.0 % Final   09/15/2020 46.2 " 41.0 - 53.0 % Final     MCV   Date Value Ref Range Status   05/26/2022 95.3 79.0 - 97.0 fL Final     MCH   Date Value Ref Range Status   05/26/2022 32.5 26.6 - 33.0 pg Final     MCHC   Date Value Ref Range Status   05/26/2022 34.1 31.5 - 35.7 g/dL Final     RDW   Date Value Ref Range Status   05/26/2022 13.4 12.3 - 15.4 % Final     RDW-SD   Date Value Ref Range Status   05/26/2022 45.9 37.0 - 54.0 fl Final     MPV   Date Value Ref Range Status   05/26/2022 9.0 6.0 - 12.0 fL Final     Platelets   Date Value Ref Range Status   05/26/2022 327 140 - 450 10*3/mm3 Final     Neutrophil %   Date Value Ref Range Status   05/26/2022 48.6 42.7 - 76.0 % Final     Lymphocyte %   Date Value Ref Range Status   05/26/2022 35.9 19.6 - 45.3 % Final     Monocyte %   Date Value Ref Range Status   05/26/2022 13.4 (H) 5.0 - 12.0 % Final     Eosinophil %   Date Value Ref Range Status   05/26/2022 1.6 0.3 - 6.2 % Final     Basophil %   Date Value Ref Range Status   05/26/2022 0.5 0.0 - 1.5 % Final     Immature Grans %   Date Value Ref Range Status   07/13/2021 0.5 0.0 - 0.5 % Final     Neutrophils, Absolute   Date Value Ref Range Status   05/26/2022 6.92 1.70 - 7.00 10*3/mm3 Final     Lymphocytes, Absolute   Date Value Ref Range Status   05/26/2022 5.11 (H) 0.70 - 3.10 10*3/mm3 Final     Monocytes, Absolute   Date Value Ref Range Status   05/26/2022 1.90 (H) 0.10 - 0.90 10*3/mm3 Final     Eosinophils, Absolute   Date Value Ref Range Status   05/26/2022 0.23 0.00 - 0.40 10*3/mm3 Final     Basophils, Absolute   Date Value Ref Range Status   05/26/2022 0.07 0.00 - 0.20 10*3/mm3 Final     Immature Grans, Absolute   Date Value Ref Range Status   07/13/2021 0.07 (H) 0.00 - 0.05 10*3/mm3 Final     nRBC   Date Value Ref Range Status   07/13/2021 0.0 0.0 - 0.2 /100 WBC Final       Lab Results   Component Value Date    GLUCOSE 190 (H) 05/26/2022    BUN 17 05/26/2022    CREATININE 0.79 05/26/2022    EGFRIFNONA 93 05/07/2021    BCR 21.5 05/26/2022     K 3.8 05/26/2022    CO2 23.0 05/26/2022    CALCIUM 9.5 05/26/2022    ALBUMIN 4.20 05/26/2022    LABIL2 1.3 08/24/2020    AST 24 05/26/2022    ALT 23 05/26/2022           ASSESSMENT:    1. Leukocytosis likely reactive and also post splenectomy with negative peripheral work up so far.  Continue to monitor the CBC  2. History of right pulmonary nodules.  Nodules have remained stable for approximately 3 years.  No further follow-up is required per Radiologist.  Reviewed with patient    PLANS:    Reviewed his CBC  Overall remained stable  Follow-up in 1 year or earlier as needed  He will continue to follow up with Dr. Teran, his primary care physician.          I have reviewed labs results, imaging, vitals, and medications with the patient today. Will follow up in 12 months with me .      Patient verbalized understanding and is in agreement of the above plan.

## 2022-05-26 NOTE — PROGRESS NOTES
Patient is spilling some protein in his urine so we need to keep his blood pressure and blood sugar under good control.

## 2022-05-26 NOTE — TELEPHONE ENCOUNTER
HUB TO READ:  ----- Message from Kavitha Teran MD sent at 5/26/2022  4:18 PM EDT -----  Blood sugar was 190 and A1c shows that sugar is not controlled at 8.6 which has worsened.  Please call Dr. Man's office and see if he wishes to increase the Ozempic.

## 2022-05-27 ENCOUNTER — TELEPHONE (OUTPATIENT)
Dept: FAMILY MEDICINE CLINIC | Facility: CLINIC | Age: 62
End: 2022-05-27

## 2022-05-27 NOTE — TELEPHONE ENCOUNTER
HUB TO READ:  ----- Message from Kavitha Teran MD sent at 5/26/2022  6:51 PM EDT -----  Patient is spilling some protein in his urine so we need to keep his blood pressure and blood sugar under good control.

## 2022-05-31 DIAGNOSIS — E11.65 TYPE 2 DIABETES MELLITUS WITH HYPERGLYCEMIA, WITHOUT LONG-TERM CURRENT USE OF INSULIN: Primary | ICD-10-CM

## 2022-05-31 RX ORDER — SEMAGLUTIDE 2.68 MG/ML
2 INJECTION, SOLUTION SUBCUTANEOUS
Qty: 3 ML | Refills: 11 | Status: SHIPPED | OUTPATIENT
Start: 2022-05-31 | End: 2022-10-28 | Stop reason: SDUPTHER

## 2022-05-31 RX ORDER — GLYBURIDE 5 MG/1
TABLET ORAL
Qty: 120 TABLET | Refills: 3 | Status: SHIPPED | OUTPATIENT
Start: 2022-05-31 | End: 2022-08-29

## 2022-06-08 ENCOUNTER — APPOINTMENT (OUTPATIENT)
Dept: CT IMAGING | Facility: HOSPITAL | Age: 62
End: 2022-06-08

## 2022-06-08 ENCOUNTER — HOSPITAL ENCOUNTER (EMERGENCY)
Facility: HOSPITAL | Age: 62
Discharge: HOME | End: 2022-06-08
Attending: EMERGENCY MEDICINE | Admitting: EMERGENCY MEDICINE

## 2022-06-08 VITALS
WEIGHT: 211.86 LBS | SYSTOLIC BLOOD PRESSURE: 122 MMHG | HEART RATE: 68 BPM | HEIGHT: 66 IN | DIASTOLIC BLOOD PRESSURE: 70 MMHG | TEMPERATURE: 98 F | OXYGEN SATURATION: 94 % | BODY MASS INDEX: 34.05 KG/M2 | RESPIRATION RATE: 16 BRPM

## 2022-06-08 DIAGNOSIS — M79.605 LEFT LEG PAIN: Primary | ICD-10-CM

## 2022-06-08 DIAGNOSIS — L03.116 CELLULITIS OF LEFT LOWER LEG: ICD-10-CM

## 2022-06-08 LAB
ANION GAP SERPL CALCULATED.3IONS-SCNC: 14 MMOL/L (ref 5–15)
BASOPHILS # BLD AUTO: 0.1 10*3/MM3 (ref 0–0.2)
BASOPHILS NFR BLD AUTO: 0.4 % (ref 0–1.5)
BUN SERPL-MCNC: 12 MG/DL (ref 8–23)
BUN/CREAT SERPL: 14.5 (ref 7–25)
CALCIUM SPEC-SCNC: 9.4 MG/DL (ref 8.6–10.5)
CHLORIDE SERPL-SCNC: 104 MMOL/L (ref 98–107)
CK SERPL-CCNC: 80 U/L (ref 20–200)
CO2 SERPL-SCNC: 22 MMOL/L (ref 22–29)
CREAT SERPL-MCNC: 0.83 MG/DL (ref 0.76–1.27)
D DIMER PPP FEU-MCNC: 0.78 MG/L (FEU) (ref 0–0.59)
DEPRECATED RDW RBC AUTO: 44.2 FL (ref 37–54)
EGFRCR SERPLBLD CKD-EPI 2021: 99.6 ML/MIN/1.73
EOSINOPHIL # BLD AUTO: 0.2 10*3/MM3 (ref 0–0.4)
EOSINOPHIL NFR BLD AUTO: 1.3 % (ref 0.3–6.2)
ERYTHROCYTE [DISTWIDTH] IN BLOOD BY AUTOMATED COUNT: 13.3 % (ref 12.3–15.4)
ERYTHROCYTE [SEDIMENTATION RATE] IN BLOOD: 20 MM/HR (ref 0–20)
GLUCOSE SERPL-MCNC: 105 MG/DL (ref 65–99)
HCT VFR BLD AUTO: 50.1 % (ref 37.5–51)
HGB BLD-MCNC: 16.8 G/DL (ref 13–17.7)
HOLD SPECIMEN: NORMAL
LYMPHOCYTES # BLD AUTO: 6.8 10*3/MM3 (ref 0.7–3.1)
LYMPHOCYTES NFR BLD AUTO: 42.3 % (ref 19.6–45.3)
MCH RBC QN AUTO: 31.6 PG (ref 26.6–33)
MCHC RBC AUTO-ENTMCNC: 33.5 G/DL (ref 31.5–35.7)
MCV RBC AUTO: 94.3 FL (ref 79–97)
MONOCYTES # BLD AUTO: 2 10*3/MM3 (ref 0.1–0.9)
MONOCYTES NFR BLD AUTO: 12.3 % (ref 5–12)
NEUTROPHILS NFR BLD AUTO: 43.7 % (ref 42.7–76)
NEUTROPHILS NFR BLD AUTO: 7.1 10*3/MM3 (ref 1.7–7)
NRBC BLD AUTO-RTO: 0.1 /100 WBC (ref 0–0.2)
PLATELET # BLD AUTO: 367 10*3/MM3 (ref 140–450)
PMV BLD AUTO: 6.8 FL (ref 6–12)
POTASSIUM SERPL-SCNC: 3.8 MMOL/L (ref 3.5–5.2)
RBC # BLD AUTO: 5.31 10*6/MM3 (ref 4.14–5.8)
SODIUM SERPL-SCNC: 140 MMOL/L (ref 136–145)
WBC NRBC COR # BLD: 16.2 10*3/MM3 (ref 3.4–10.8)

## 2022-06-08 PROCEDURE — 82550 ASSAY OF CK (CPK): CPT | Performed by: EMERGENCY MEDICINE

## 2022-06-08 PROCEDURE — 36415 COLL VENOUS BLD VENIPUNCTURE: CPT

## 2022-06-08 PROCEDURE — 96372 THER/PROPH/DIAG INJ SC/IM: CPT

## 2022-06-08 PROCEDURE — 99283 EMERGENCY DEPT VISIT LOW MDM: CPT

## 2022-06-08 PROCEDURE — 73700 CT LOWER EXTREMITY W/O DYE: CPT

## 2022-06-08 PROCEDURE — 85379 FIBRIN DEGRADATION QUANT: CPT | Performed by: EMERGENCY MEDICINE

## 2022-06-08 PROCEDURE — 25010000002 CEFTRIAXONE PER 250 MG: Performed by: EMERGENCY MEDICINE

## 2022-06-08 PROCEDURE — 85025 COMPLETE CBC W/AUTO DIFF WBC: CPT | Performed by: EMERGENCY MEDICINE

## 2022-06-08 PROCEDURE — 80048 BASIC METABOLIC PNL TOTAL CA: CPT | Performed by: EMERGENCY MEDICINE

## 2022-06-08 PROCEDURE — 85652 RBC SED RATE AUTOMATED: CPT | Performed by: EMERGENCY MEDICINE

## 2022-06-08 RX ORDER — DOXYCYCLINE HYCLATE 100 MG/1
100 CAPSULE ORAL 2 TIMES DAILY
Qty: 20 CAPSULE | Refills: 0 | Status: SHIPPED | OUTPATIENT
Start: 2022-06-08 | End: 2022-08-01

## 2022-06-08 RX ADMIN — APIXABAN 10 MG: 5 TABLET, FILM COATED ORAL at 23:20

## 2022-06-08 RX ADMIN — LIDOCAINE HYDROCHLORIDE 1 G: 10 INJECTION, SOLUTION EPIDURAL; INFILTRATION; INTRACAUDAL; PERINEURAL at 23:34

## 2022-06-09 ENCOUNTER — TELEPHONE (OUTPATIENT)
Dept: FAMILY MEDICINE CLINIC | Facility: CLINIC | Age: 62
End: 2022-06-09

## 2022-06-09 NOTE — ED PROVIDER NOTES
Subjective   Chief complaint pain and swelling to the left lower leg concerned about blood clot    History of present illness 61-year-old male who complains of about a 5-day history of some pain and swelling and redness to his left lower leg concerned about blood clot.  No fever chills no numbness or tingling moderate throbbing aching nonradiating on the anterior portion of his lower leg.  Nothing makes it better is worse with movement.  There is no chest pain or shortness of breath.  Patient has been at home.  Denies any injury.  No other complaints or associated symptoms.          Review of Systems   Constitutional: Negative for chills and fever.   Respiratory: Negative for chest tightness and shortness of breath.    Cardiovascular: Positive for leg swelling. Negative for chest pain.   Gastrointestinal: Negative for abdominal pain and vomiting.   Genitourinary: Negative for difficulty urinating and dysuria.   Musculoskeletal: Negative for back pain and neck pain.   Skin: Negative for color change and rash.   Neurological: Negative for weakness and numbness.   Psychiatric/Behavioral: Negative for agitation and behavioral problems.       Past Medical History:   Diagnosis Date   • B12 deficiency 2/2/2015   • Broken finger     broken middle finger   • Coronary artery disease    • Diabetes mellitus (HCC)    • Gout    • Heart attack (HCC) 2017   • Heart murmur    • Hx of migraines    • Hyperlipidemia    • Hypertension    • Plantar fasciitis, left 1/9/2017       Allergies   Allergen Reactions   • Gabapentin Dizziness       Past Surgical History:   Procedure Laterality Date   • ANKLE SURGERY Left 2001   • CARDIAC CATHETERIZATION  2017   • CARPAL TUNNEL RELEASE Bilateral    • CATARACT EXTRACTION  2015   • CERVICAL SPINE SURGERY     • HERNIA REPAIR     • OTHER SURGICAL HISTORY  2017    MI with Stent 2010, 2017    • SPLENECTOMY         Family History   Problem Relation Age of Onset   • Diabetes Paternal Grandmother    •  Cancer Other        Social History     Socioeconomic History   • Marital status:      Spouse name: Yesi   • Number of children: 1   • Years of education: 12   Tobacco Use   • Smoking status: Former Smoker     Packs/day: 0.50     Types: Cigarettes     Quit date:      Years since quittin.4   • Smokeless tobacco: Never Used   Vaping Use   • Vaping Use: Never used   Substance and Sexual Activity   • Alcohol use: No   • Drug use: No   • Sexual activity: Yes     Partners: Female     Birth control/protection: None     Prior to Admission medications    Medication Sig Start Date End Date Taking? Authorizing Provider   aspirin 81 MG EC tablet Take 81 mg by mouth 3 (Three) Times a Week.    Janet Mcgrath MD   cyclobenzaprine (FLEXERIL) 10 MG tablet Take 10 mg by mouth 2 (Two) Times a Day As Needed. 9/15/20   Janet Mcgrath MD   dilTIAZem (TIAZAC) 300 MG 24 hr capsule Take 1 capsule by mouth Daily. 21   Kavitha Teran MD   doxycycline (VIBRAMYCIN) 100 MG capsule Take 1 capsule by mouth 2 (Two) Times a Day. 22   Daren Collado MD   fenofibrate (TRICOR) 145 MG tablet Take 1 tablet by mouth once daily 20   Miki Man MD   glyburide (DIAbeta) 5 MG tablet Take 2 tablets by mouth twice daily 22   Miki Man MD   hydroCHLOROthiazide (MICROZIDE) 12.5 MG capsule Take 1 capsule by mouth once daily 22   Kavitha Teran MD   ibuprofen (ADVIL,MOTRIN) 600 MG tablet TAKE 1 TABLET (600 MG) BY MOUTH ONCE DAILY FOR 30 DAYS 22   Janet Mcgrath MD   ibuprofen (ADVIL,MOTRIN) 800 MG tablet Take 800 mg by mouth 3 (Three) Times a Day As Needed. 21   Janet Mcgrath MD   ipratropium-albuterol (DUO-NEB) 0.5-2.5 mg/3 ml nebulizer Take 3 mL by nebulization Every 4 (Four) Hours As Needed for Wheezing. 19   Kavitha Teran MD   Jardiance 25 MG tablet Take 1 tablet by mouth once daily 21   Chen Welch MD   lidocaine (XYLOCAINE) 5 %  ointment USE OINTMENT EXTERNALLY THREE TIMES A DAY FOR 30 DAYS AS NEEDED 8/23/19   Janet Mcgrath MD   losartan (COZAAR) 100 MG tablet Take 1 tablet by mouth Daily. 3/1/21   Kavitha Teran MD   LYRICA 100 MG capsule Take 100 mg by mouth 2 (Two) Times a Day As Needed (patient states only takes as needed). 5/29/19   Janet Mcgrath MD   metFORMIN ER (GLUCOPHAGE-XR) 500 MG 24 hr tablet Take 2 tablets by mouth 2 (Two) Times a Day. 4/14/22   Miki Man MD   metoprolol tartrate (LOPRESSOR) 25 MG tablet Take 25 mg by mouth 2 (Two) Times a Day.    Janet Mcgrath MD   oxyCODONE (ROXICODONE) 10 MG tablet Take 10 mg by mouth Every 6 (Six) Hours As Needed. 9/10/20   Janet Mcgrath MD   pravastatin (Pravachol) 40 MG tablet Take 1 tablet by mouth Daily. Per Ins prefers Pravastatin over Atorvastatin. 1/21/22   Miki Man MD   Semaglutide, 2 MG/DOSE, (Ozempic, 2 MG/DOSE,) 8 MG/3ML solution pen-injector Inject 2 mg under the skin into the appropriate area as directed Every 7 (Seven) Days. 5/31/22   Miki Man MD   tadalafil (Cialis) 10 MG tablet Take 1 tablet p.o. as needed daily. 4/14/22   Miki Man MD           Objective   Physical Exam  Constitutional 61-year-old male awake alert no acute distress temperature 98 blood pressure 120/70 heart rate 68.  Lungs clear no retractions heart regular without murmur.  Abdomen soft without tenderness no pulsatile masses.  Examination of the left leg the patient has some erythema noted about the mid anterior leg he has some mild tenderness just medial to the mid tib-fib.  There is no fluctuance or crepitus subtendinous air there is some erythema warmth.  There is no pain to the calf no palpable cords or Homans' sign.  She has good and equal femoral popliteal dorsalis pedis and posterior tibialis pulses.  Toes up-and-down including big toe dorsiflex plantarflex at difficulty good cap refill.  No pain to the foot or the ankle or to the knee  itself full range of motion of the knee and hip no evidence of a septic hip or knee.  No step-off or deformity.  There is no pain with passive stretching and no palpable portion exam no evidence of compartment syndrome.  Compartments are soft to palpation.  There is tenderness to this anterior area with some mild warmth and erythema.  There is no red streaks.  Neurologic awake alert follows commands motor strength normal without focal weakness.  Procedures           ED Course      Results for orders placed or performed during the hospital encounter of 06/08/22   Basic Metabolic Panel    Specimen: Blood   Result Value Ref Range    Glucose 105 (H) 65 - 99 mg/dL    BUN 12 8 - 23 mg/dL    Creatinine 0.83 0.76 - 1.27 mg/dL    Sodium 140 136 - 145 mmol/L    Potassium 3.8 3.5 - 5.2 mmol/L    Chloride 104 98 - 107 mmol/L    CO2 22.0 22.0 - 29.0 mmol/L    Calcium 9.4 8.6 - 10.5 mg/dL    BUN/Creatinine Ratio 14.5 7.0 - 25.0    Anion Gap 14.0 5.0 - 15.0 mmol/L    eGFR 99.6 >60.0 mL/min/1.73   CK    Specimen: Blood   Result Value Ref Range    Creatine Kinase 80 20 - 200 U/L   D-dimer, Quantitative    Specimen: Blood   Result Value Ref Range    D-Dimer, Quantitative 0.78 (H) 0.00 - 0.59 mg/L (FEU)   Sedimentation Rate    Specimen: Blood   Result Value Ref Range    Sed Rate 20 0 - 20 mm/hr   CBC Auto Differential    Specimen: Blood   Result Value Ref Range    WBC 16.20 (H) 3.40 - 10.80 10*3/mm3    RBC 5.31 4.14 - 5.80 10*6/mm3    Hemoglobin 16.8 13.0 - 17.7 g/dL    Hematocrit 50.1 37.5 - 51.0 %    MCV 94.3 79.0 - 97.0 fL    MCH 31.6 26.6 - 33.0 pg    MCHC 33.5 31.5 - 35.7 g/dL    RDW 13.3 12.3 - 15.4 %    RDW-SD 44.2 37.0 - 54.0 fl    MPV 6.8 6.0 - 12.0 fL    Platelets 367 140 - 450 10*3/mm3    Neutrophil % 43.7 42.7 - 76.0 %    Lymphocyte % 42.3 19.6 - 45.3 %    Monocyte % 12.3 (H) 5.0 - 12.0 %    Eosinophil % 1.3 0.3 - 6.2 %    Basophil % 0.4 0.0 - 1.5 %    Neutrophils, Absolute 7.10 (H) 1.70 - 7.00 10*3/mm3    Lymphocytes,  Absolute 6.80 (H) 0.70 - 3.10 10*3/mm3    Monocytes, Absolute 2.00 (H) 0.10 - 0.90 10*3/mm3    Eosinophils, Absolute 0.20 0.00 - 0.40 10*3/mm3    Basophils, Absolute 0.10 0.00 - 0.20 10*3/mm3    nRBC 0.1 0.0 - 0.2 /100 WBC   Gold Top - SST   Result Value Ref Range    Extra Tube Hold for add-ons.      CT Lower Extremity Left Without Contrast    Result Date: 6/8/2022   1. No acute fracture or dislocation. 2. Degenerative changes within the left ankle and foot. 3. Atherosclerotic vascular disease.  Electronically Signed By-Sam Wetzel MD On:6/8/2022 10:19 PM This report was finalized on [id number] by  Sam Wetzel MD.    Medications   cefTRIAXone (ROCEPHIN) 350 mg/ml in lidocaine 1% IM syringe (1 gm vial) (1 g Intramuscular Given 6/8/22 7085)   apixaban (ELIQUIS) tablet 10 mg (10 mg Oral Given 6/8/22 2496)                                                  MDM  Number of Diagnoses or Management Options  Cellulitis of left lower leg: new and requires workup  Left leg pain: new and requires workup  Diagnosis management comments: Deo decision making.  Patient had the above exam and evaluation.  He is here at a time in the evening when there is no vascular ultrasound to perform for DVT and he was made aware of this.  His pain is anterior just medial to the tibia there is no calf pain my suspicion for DVT is low and I suspect he has more infection than anything.  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  CK normal D-dimer was 0.78.  Sed rate was normal at 20.  Patient was made aware of the findings we talked about the need for an ultrasound with elevated D-dimer although clinically I think is more cellulitis.  I see no evidence of necrotizing fasciitis no evidence of Kathia's gangrene no evidence of clinically DVT no evidence that suggest an arterial issue foot warm dry good cap refill good pulses.  No evidence  of compartment syndrome.  Patient states he cannot come back in the morning and get an ultrasound because he has to be on the road and he will be back till about 3:00 in the afternoon.  Patient states that he can come back tomorrow afternoon or the following morning.  We did schedule outpatient ultrasound for when he can come in.  Talked about overnight observation for IV antibiotics and ultrasound the morning as he is got a white count of 16,000 I suspect infection.  He did have a CT scan of that leg which showed no no fracture dislocation there was degenerative changes in the vascular disease but there was no evidence of any fluid collection per radiology report is reviewed by me.  I do not see evidence of acute arterial occlusion my clinical exam.  He has no history that suggest claudication.  He did not want to stay in the hospital states he has to be out of town in the morning.  We talked about the risk potential complications including pulmonary embolism.  Patient was given Rocephin 1 g IM he was given Eliquis 10 mg p.o.  He will come back here tomorrow before 7:00 for recheck and reevaluation and ultrasound.  Stable otherwise unremarkable ER course.       Amount and/or Complexity of Data Reviewed  Clinical lab tests: reviewed  Tests in the radiology section of CPT®: reviewed    Risk of Complications, Morbidity, and/or Mortality  Presenting problems: moderate  Diagnostic procedures: moderate  Management options: moderate    Patient Progress  Patient progress: stable      Final diagnoses:   Left leg pain   Cellulitis of left lower leg       ED Disposition  ED Disposition     ED Disposition   Discharge    Condition   Stable    Comment   --             Kavitha Teran MD  [address] [place] [address]  [phone]    In 2 days           Medication List      New Prescriptions    doxycycline 100 MG capsule  Commonly known as: VIBRAMYCIN  Take 1 capsule by mouth 2 (Two) Times a Day.           Where to Get  Your Medications      These medications were sent to [facility]/pharm[facility] #[id number] - [place] [place] [facility] [address] [facility] - [phone]  - [phone] FX  [address] [facility] [place] [address]    Phone: [phone]   · doxycycline 100 MG capsule          Daren Collado MD  06/09/22 0238

## 2022-06-09 NOTE — DISCHARGE INSTRUCTIONS
Doxycycline sent to your pharmacy  Elevate leg.  Return here tomorrow for a recheck and ultrasound of your leg but return before 7:00 PM.  Return for chest pain shortness of breath dizziness passing out increasing redness fevers or any other new or worse problems or concerns.

## 2022-06-09 NOTE — TELEPHONE ENCOUNTER
Please call patient and make sure that his leg is doing better and we should check him sometime early next week

## 2022-06-10 ENCOUNTER — APPOINTMENT (OUTPATIENT)
Dept: CARDIOLOGY | Facility: HOSPITAL | Age: 62
End: 2022-06-10

## 2022-06-10 ENCOUNTER — HOSPITAL ENCOUNTER (EMERGENCY)
Facility: HOSPITAL | Age: 62
Discharge: HOME | End: 2022-06-10
Attending: EMERGENCY MEDICINE | Admitting: EMERGENCY MEDICINE

## 2022-06-10 VITALS
OXYGEN SATURATION: 94 % | HEART RATE: 71 BPM | SYSTOLIC BLOOD PRESSURE: 140 MMHG | WEIGHT: 211.86 LBS | RESPIRATION RATE: 16 BRPM | TEMPERATURE: 98.1 F | BODY MASS INDEX: 34.05 KG/M2 | DIASTOLIC BLOOD PRESSURE: 72 MMHG | HEIGHT: 66 IN

## 2022-06-10 DIAGNOSIS — L03.116 CELLULITIS AND ABSCESS OF LEFT LOWER EXTREMITY: Primary | ICD-10-CM

## 2022-06-10 DIAGNOSIS — L02.416 CELLULITIS AND ABSCESS OF LEFT LOWER EXTREMITY: Primary | ICD-10-CM

## 2022-06-10 LAB
BH CV LOWER VASCULAR LEFT COMMON FEMORAL AUGMENT: NORMAL
BH CV LOWER VASCULAR LEFT COMMON FEMORAL COMPETENT: NORMAL
BH CV LOWER VASCULAR LEFT COMMON FEMORAL COMPRESS: NORMAL
BH CV LOWER VASCULAR LEFT COMMON FEMORAL PHASIC: NORMAL
BH CV LOWER VASCULAR LEFT COMMON FEMORAL SPONT: NORMAL
BH CV LOWER VASCULAR LEFT DISTAL FEMORAL COMPRESS: NORMAL
BH CV LOWER VASCULAR LEFT GASTRONEMIUS COMPRESS: NORMAL
BH CV LOWER VASCULAR LEFT GREATER SAPH AK COMPRESS: NORMAL
BH CV LOWER VASCULAR LEFT GREATER SAPH BK COMPRESS: NORMAL
BH CV LOWER VASCULAR LEFT LESSER SAPH COMPRESS: NORMAL
BH CV LOWER VASCULAR LEFT MID FEMORAL AUGMENT: NORMAL
BH CV LOWER VASCULAR LEFT MID FEMORAL COMPETENT: NORMAL
BH CV LOWER VASCULAR LEFT MID FEMORAL COMPRESS: NORMAL
BH CV LOWER VASCULAR LEFT MID FEMORAL PHASIC: NORMAL
BH CV LOWER VASCULAR LEFT MID FEMORAL SPONT: NORMAL
BH CV LOWER VASCULAR LEFT PERONEAL COMPRESS: NORMAL
BH CV LOWER VASCULAR LEFT POPLITEAL AUGMENT: NORMAL
BH CV LOWER VASCULAR LEFT POPLITEAL COMPETENT: NORMAL
BH CV LOWER VASCULAR LEFT POPLITEAL COMPRESS: NORMAL
BH CV LOWER VASCULAR LEFT POPLITEAL PHASIC: NORMAL
BH CV LOWER VASCULAR LEFT POPLITEAL SPONT: NORMAL
BH CV LOWER VASCULAR LEFT POSTERIOR TIBIAL COMPRESS: NORMAL
BH CV LOWER VASCULAR LEFT PROXIMAL FEMORAL COMPRESS: NORMAL
BH CV LOWER VASCULAR LEFT SAPHENOFEMORAL JUNCTION COMPRESS: NORMAL
BH CV LOWER VASCULAR RIGHT COMMON FEMORAL AUGMENT: NORMAL
BH CV LOWER VASCULAR RIGHT COMMON FEMORAL COMPETENT: NORMAL
BH CV LOWER VASCULAR RIGHT COMMON FEMORAL COMPRESS: NORMAL
BH CV LOWER VASCULAR RIGHT COMMON FEMORAL PHASIC: NORMAL
BH CV LOWER VASCULAR RIGHT COMMON FEMORAL SPONT: NORMAL
MAXIMAL PREDICTED HEART RATE: 159 BPM
STRESS TARGET HR: 135 BPM

## 2022-06-10 PROCEDURE — 93971 EXTREMITY STUDY: CPT

## 2022-06-10 PROCEDURE — 99283 EMERGENCY DEPT VISIT LOW MDM: CPT

## 2022-06-10 NOTE — ED PROVIDER NOTES
Subjective   History of Present Illness  History Provided By: Patient    Chief Complaint: Left lower leg pain  Onset: Several days ago  Timing: Improving  Location: Left anteromedial lower leg  Quality: Pain, swelling  Severity: Mild to moderate  Modifying Factors: Has been improving with medications he was discharged with    Other: 61-year-old male presents after being seen 2 days ago for left lower extremity pain and swelling.  Positive D-dimer.  Infectious process was suspected but was recommended to follow-up for ultrasound.  Patient did not return yesterday.  Coming back today.  States it has been improving significantly with antibiotics.  Did take the 2 doses of Eliquis he was on previously denies any chest pain or shortness of breath.    Review of Systems   Constitutional: Negative for chills and fever.   Respiratory: Negative for chest tightness and shortness of breath.    Cardiovascular: Negative for chest pain.   All other systems reviewed and are negative.      Past Medical History:   Diagnosis Date   • B12 deficiency 2/2/2015   • Broken finger     broken middle finger   • Coronary artery disease    • Diabetes mellitus (HCC)    • Gout    • Heart attack (HCC) 2017   • Heart murmur    • Hx of migraines    • Hyperlipidemia    • Hypertension    • Plantar fasciitis, left 1/9/2017       Allergies   Allergen Reactions   • Gabapentin Dizziness       Past Surgical History:   Procedure Laterality Date   • ANKLE SURGERY Left 2001   • CARDIAC CATHETERIZATION  2017   • CARPAL TUNNEL RELEASE Bilateral    • CATARACT EXTRACTION  2015   • CERVICAL SPINE SURGERY     • HERNIA REPAIR     • OTHER SURGICAL HISTORY  2017    MI with Stent 2010, 2017    • SPLENECTOMY         Family History   Problem Relation Age of Onset   • Diabetes Paternal Grandmother    • Cancer Other        Social History     Socioeconomic History   • Marital status:      Spouse name: Yesi   • Number of children: 1   • Years of education: 12  "  Tobacco Use   • Smoking status: Former Smoker     Packs/day: 0.50     Types: Cigarettes     Quit date:      Years since quittin.4   • Smokeless tobacco: Never Used   Vaping Use   • Vaping Use: Never used   Substance and Sexual Activity   • Alcohol use: No   • Drug use: No   • Sexual activity: Yes     Partners: Female     Birth control/protection: None           Objective   Physical Exam  Constitutional:  No acute distress.  Head:  Atraumatic.  Normocephalic.   Eyes:  No scleral icterus. Normal conjunctiva  ENT:  Moist mucosa.  No nasal discharge present.  Cardiovascular:  Well perfused.  Equal pulses.  Regular rate.  Normal capillary refill.    Pulmonary/Chest:  No respiratory distress.  Airway patent.  No tachypnea.  No accessory muscle usage.    Abdominal:  Non-distended. Non-tender.   Extremities:  No peripheral edema.  Mild swelling, erythema, tenderness to palpation over right anteromedial calf.  Intact distal pulses.  Strength intact.  Sensation intact.  Skin:  Warm, dry  Neurological:  Alert, awake, and appropriate.  Normal speech.      Procedures           ED Course      /72 (BP Location: Left arm, Patient Position: Lying)   Pulse 71   Temp 98.1 °F (36.7 °C) (Oral)   Resp 16   Ht 167.6 cm (66\")   Wt 96.1 kg (211 lb 13.8 oz)   SpO2 94%   BMI 34.20 kg/m²   Labs Reviewed - No data to display  Medications - No data to display  No radiology results for the last day                                             MDM  Ultrasound negative.  Patient symptoms improving.  Likely infectious process.  Instructed to continue antibiotics.  Patient agreeable with plan.  Final diagnoses:   Cellulitis and abscess of left lower extremity       ED Disposition  ED Disposition     ED Disposition   Discharge    Condition   Stable    Comment   --             Kavitha Teran MD  [address] [place] [address] [place] [address]  [phone]    In 3 days           Medication List      No changes were made to your " prescriptions during this visit.          Gil Alexandre MD  06/10/22 9163

## 2022-06-10 NOTE — ED TRIAGE NOTES
Pt reports had LLE pain and mild redness for past 4 days, pt seen here on Wednesday for same sx and dx with cellulitis to leg, pt started on oral blood thinners and oral abx. Pt was supposed to come back on Thursday-6/09/22 for US of LLE pt states he was out of town and did not make it in for that. Pt reports the pain is better, pt states he is here today just to get the US of his leg.

## 2022-06-14 ENCOUNTER — OFFICE VISIT (OUTPATIENT)
Dept: FAMILY MEDICINE CLINIC | Facility: CLINIC | Age: 62
End: 2022-06-14

## 2022-06-14 VITALS
SYSTOLIC BLOOD PRESSURE: 159 MMHG | HEIGHT: 66 IN | WEIGHT: 217 LBS | HEART RATE: 82 BPM | TEMPERATURE: 97.2 F | OXYGEN SATURATION: 95 % | DIASTOLIC BLOOD PRESSURE: 76 MMHG | BODY MASS INDEX: 34.87 KG/M2

## 2022-06-14 DIAGNOSIS — E78.2 MIXED HYPERLIPIDEMIA: ICD-10-CM

## 2022-06-14 DIAGNOSIS — E11.65 TYPE 2 DIABETES MELLITUS WITH HYPERGLYCEMIA, WITHOUT LONG-TERM CURRENT USE OF INSULIN: ICD-10-CM

## 2022-06-14 DIAGNOSIS — I65.23 CAROTID STENOSIS, ASYMPTOMATIC, BILATERAL: ICD-10-CM

## 2022-06-14 DIAGNOSIS — E66.01 CLASS 2 SEVERE OBESITY WITH SERIOUS COMORBIDITY AND BODY MASS INDEX (BMI) OF 35.0 TO 35.9 IN ADULT, UNSPECIFIED OBESITY TYPE: ICD-10-CM

## 2022-06-14 DIAGNOSIS — G47.33 OSA (OBSTRUCTIVE SLEEP APNEA): Chronic | ICD-10-CM

## 2022-06-14 DIAGNOSIS — E55.9 VITAMIN D DEFICIENCY: ICD-10-CM

## 2022-06-14 DIAGNOSIS — M10.9 GOUT, UNSPECIFIED CAUSE, UNSPECIFIED CHRONICITY, UNSPECIFIED SITE: Chronic | ICD-10-CM

## 2022-06-14 DIAGNOSIS — Z12.11 SCREENING FOR COLON CANCER: ICD-10-CM

## 2022-06-14 DIAGNOSIS — Z00.01 ENCOUNTER FOR ANNUAL GENERAL MEDICAL EXAMINATION WITH ABNORMAL FINDINGS IN ADULT: Primary | ICD-10-CM

## 2022-06-14 DIAGNOSIS — E53.8 B12 DEFICIENCY: ICD-10-CM

## 2022-06-14 DIAGNOSIS — I10 ESSENTIAL HYPERTENSION: ICD-10-CM

## 2022-06-14 DIAGNOSIS — L03.116 CELLULITIS OF LEFT LEG: ICD-10-CM

## 2022-06-14 DIAGNOSIS — I25.10 CORONARY ARTERY DISEASE INVOLVING NATIVE CORONARY ARTERY OF NATIVE HEART WITHOUT ANGINA PECTORIS: ICD-10-CM

## 2022-06-14 PROBLEM — H53.001 AMBLYOPIA OF RIGHT EYE: Status: ACTIVE | Noted: 2022-03-16

## 2022-06-14 PROBLEM — M19.019 LOCALIZED, PRIMARY OSTEOARTHRITIS OF SHOULDER REGION: Status: ACTIVE | Noted: 2022-06-14

## 2022-06-14 PROBLEM — H16.229 KERATOCONJUNCTIVITIS SICCA: Status: ACTIVE | Noted: 2022-03-16

## 2022-06-14 PROBLEM — R04.0 EPISTAXIS: Status: RESOLVED | Noted: 2020-07-20 | Resolved: 2022-06-14

## 2022-06-14 PROBLEM — M35.01 KERATOCONJUNCTIVITIS SICCA: Status: ACTIVE | Noted: 2022-03-16

## 2022-06-14 PROBLEM — G56.00 CARPAL TUNNEL SYNDROME: Status: ACTIVE | Noted: 2022-06-14

## 2022-06-14 PROCEDURE — 99396 PREV VISIT EST AGE 40-64: CPT | Performed by: PREVENTIVE MEDICINE

## 2022-06-14 PROCEDURE — 99213 OFFICE O/P EST LOW 20 MIN: CPT | Performed by: PREVENTIVE MEDICINE

## 2022-06-14 NOTE — PATIENT INSTRUCTIONS
Health Maintenance Due   Topic Date Due    COLORECTAL CANCER SCREENING  Never done    COVID-19 Vaccine (1) Never done    TDAP/TD VACCINES (1 - Tdap) Never done    ZOSTER VACCINE (1 of 2) Never done    Pneumococcal Vaccine 0-64 (3 - PPSV23 or PCV20) 01/01/2018    DIABETIC FOOT EXAM  07/07/2021    ANNUAL PHYSICAL  03/02/2022    12 hour fast for labs    Call after antibiotics complete and will decide if needs more

## 2022-06-14 NOTE — PROGRESS NOTES
"Subjective   Dawson Lomax is a 61 y.o. male presents for   Chief Complaint   Patient presents with   • Leg Pain     Left leg pain      Patient presents today for his annual wellness exam and has been advised to wear sunscreen and a seatbelt.  Patient does not check his blood and does not feel anything has been high or low.  Also here to check on review problem which she has been to the emergency room for twice which was cellulitis of the left leg.  He did have venous Doppler and there is no blood clot there despite an elevated he has been taking doxycycline and feels as though this is 75% better he will call back after he is finished antibiotics if his symptoms persist.  Health Maintenance Due   Topic Date Due   • COLORECTAL CANCER SCREENING  Never done   • COVID-19 Vaccine (1) Never done   • TDAP/TD VACCINES (1 - Tdap) Never done   • ZOSTER VACCINE (1 of 2) Never done   • Pneumococcal Vaccine 0-64 (3 - PPSV23 or PCV20) 01/01/2018   • ANNUAL PHYSICAL  03/02/2022       History of Present Illness     Vitals:    06/14/22 1022 06/14/22 1024 06/14/22 1025   BP: 165/91 165/80 159/76   BP Location: Left arm Right arm Right arm   Patient Position: Sitting Sitting Standing   Cuff Size: Adult Adult Adult   Pulse: 82     Temp: 97.2 °F (36.2 °C)     TempSrc: Temporal     SpO2: 95%     Weight: 98.4 kg (217 lb)     Height: 167.6 cm (66\")       Body mass index is 35.02 kg/m².    Current Outpatient Medications on File Prior to Visit   Medication Sig Dispense Refill   • aspirin 81 MG EC tablet Take 81 mg by mouth 3 (Three) Times a Week.     • dilTIAZem (TIAZAC) 300 MG 24 hr capsule Take 1 capsule by mouth Daily. 90 capsule 3   • doxycycline (VIBRAMYCIN) 100 MG capsule Take 1 capsule by mouth 2 (Two) Times a Day. 20 capsule 0   • fenofibrate (TRICOR) 145 MG tablet Take 1 tablet by mouth once daily 90 tablet 3   • glyburide (DIAbeta) 5 MG tablet Take 2 tablets by mouth twice daily 120 tablet 3   • hydroCHLOROthiazide (MICROZIDE) " 12.5 MG capsule Take 1 capsule by mouth once daily 90 capsule 0   • ibuprofen (ADVIL,MOTRIN) 600 MG tablet TAKE 1 TABLET (600 MG) BY MOUTH ONCE DAILY FOR 30 DAYS     • ipratropium-albuterol (DUO-NEB) 0.5-2.5 mg/3 ml nebulizer Take 3 mL by nebulization Every 4 (Four) Hours As Needed for Wheezing. 360 mL 3   • Jardiance 25 MG tablet Take 1 tablet by mouth once daily 30 tablet 11   • LYRICA 100 MG capsule Take 100 mg by mouth 2 (Two) Times a Day As Needed (patient states only takes as needed).  0   • metFORMIN ER (GLUCOPHAGE-XR) 500 MG 24 hr tablet Take 2 tablets by mouth 2 (Two) Times a Day. 180 tablet 6   • oxyCODONE (ROXICODONE) 10 MG tablet Take 10 mg by mouth Every 6 (Six) Hours As Needed.     • pravastatin (Pravachol) 40 MG tablet Take 1 tablet by mouth Daily. Per Ins prefers Pravastatin over Atorvastatin. 30 tablet 5   • Semaglutide, 2 MG/DOSE, (Ozempic, 2 MG/DOSE,) 8 MG/3ML solution pen-injector Inject 2 mg under the skin into the appropriate area as directed Every 7 (Seven) Days. 3 mL 11   • tadalafil (Cialis) 10 MG tablet Take 1 tablet p.o. as needed daily. 20 tablet 5   • metoprolol tartrate (LOPRESSOR) 25 MG tablet Take 25 mg by mouth 2 (Two) Times a Day.     • [DISCONTINUED] cyclobenzaprine (FLEXERIL) 10 MG tablet Take 10 mg by mouth 2 (Two) Times a Day As Needed.     • [DISCONTINUED] ibuprofen (ADVIL,MOTRIN) 800 MG tablet Take 800 mg by mouth 3 (Three) Times a Day As Needed.     • [DISCONTINUED] lidocaine (XYLOCAINE) 5 % ointment USE OINTMENT EXTERNALLY THREE TIMES A DAY FOR 30 DAYS AS NEEDED  0   • [DISCONTINUED] losartan (COZAAR) 100 MG tablet Take 1 tablet by mouth Daily. 90 tablet 3     No current facility-administered medications on file prior to visit.       The following portions of the patient's history were reviewed and updated as appropriate: allergies, current medications, past family history, past medical history, past social history, past surgical history and problem list.    Review of  Systems   Musculoskeletal: Positive for gait problem and myalgias.       Objective   Physical Exam  Vitals reviewed.   Constitutional:       General: He is not in acute distress.     Appearance: Normal appearance. He is well-developed. He is not ill-appearing or toxic-appearing.   HENT:      Head: Normocephalic and atraumatic.      Right Ear: Tympanic membrane, ear canal and external ear normal.      Left Ear: Tympanic membrane, ear canal and external ear normal.      Nose: Nose normal.      Mouth/Throat:      Mouth: Mucous membranes are moist.      Pharynx: No posterior oropharyngeal erythema.   Eyes:      Extraocular Movements: Extraocular movements intact.      Conjunctiva/sclera: Conjunctivae normal.      Pupils: Pupils are equal, round, and reactive to light.   Cardiovascular:      Rate and Rhythm: Normal rate and regular rhythm.      Pulses:           Dorsalis pedis pulses are 1+ on the right side and 1+ on the left side.        Posterior tibial pulses are 1+ on the right side and 1+ on the left side.      Heart sounds: Normal heart sounds.   Pulmonary:      Effort: Pulmonary effort is normal.      Comments: Decreased breath sounds bilaterally  Abdominal:      General: Bowel sounds are normal. There is no distension.      Palpations: Abdomen is soft. There is no mass.      Tenderness: There is no abdominal tenderness.   Musculoskeletal:         General: Swelling present. Normal range of motion.      Cervical back: Neck supple.      Left lower leg: Edema present.   Feet:      Right foot:      Skin integrity: Skin integrity normal.      Toenail Condition: Right toenails are normal.      Left foot:      Skin integrity: Skin integrity normal.      Toenail Condition: Left toenails are normal.      Comments: Diabetic Foot Exam Performed    Skin:     General: Skin is warm.      Findings: Erythema present. No rash.      Comments: Tender mid left anterior tibial area but there was minimal redness but no lymphangitis  gross pus and only mild Swelling.  Motor neurovascularly the feet were intact   Neurological:      General: No focal deficit present.      Mental Status: He is alert and oriented to person, place, and time.   Psychiatric:         Mood and Affect: Mood normal.         Behavior: Behavior normal.       PHQ-9 Total Score: 0    Assessment & Plan   Diagnoses and all orders for this visit:    1. Encounter for annual general medical examination with abnormal findings in adult (Primary)  Comments:  Patient has been advised to wear sunscreen and seatbelt.    2. Cellulitis of left leg  Comments:  Patient still has some tenderness over the anterior tibial area but he is much improved at least 75% back to normal and will continue to finish his antibiotics     3. Screening for colon cancer  Comments:  Patient has Cologuard at home and will complete the test.    4. Type 2 diabetes mellitus with hyperglycemia, without long-term current use of insulin (HCC)  Comments:  Patient does not check his sugars regularly but as far as he knows no low or high sugars.  Orders:  -     Comprehensive Metabolic Panel  -     Hemoglobin A1c  -     Microalbumin / Creatinine Urine Ratio - Urine, Clean Catch  -     TSH    5. Vitamin D deficiency  Comments:  Takes vitamin D regularly  Orders:  -     Vitamin D 25 Hydroxy    6. Mixed hyperlipidemia  Comments:  Patient tries to watch saturated fats  Orders:  -     Lipid Panel    7. Gout, unspecified cause, unspecified chronicity, unspecified site  Comments:  No recent kidney stones or inflammatory arthritis  Orders:  -     Uric Acid    8. Essential hypertension  Comments:  Blood pressure not under good control patient will home monitor and send us the results.  Orders:  -     CBC Auto Differential    9. Coronary artery disease involving native coronary artery of native heart without angina pectoris  Comments:  No chest pain or shortness of breath.    10. Carotid stenosis, asymptomatic,  bilateral  Comments:  No increase in dizziness.  Orders:  -     Duplex Carotid Ultrasound CAR; Future    11. B12 deficiency  Comments:  Patient takes vitamin B12 when he remembers it  Orders:  -     Vitamin B12    12. MELISSA (obstructive sleep apnea)  Comments:  Breathing has been under good control.    13. Class 2 severe obesity with serious comorbidity and body mass index (BMI) of 35.0 to 35.9 in adult, unspecified obesity type (HCC)        Patient Instructions     Health Maintenance Due   Topic Date Due   • COLORECTAL CANCER SCREENING  Never done   • COVID-19 Vaccine (1) Never done   • TDAP/TD VACCINES (1 - Tdap) Never done   • ZOSTER VACCINE (1 of 2) Never done   • Pneumococcal Vaccine 0-64 (3 - PPSV23 or PCV20) 01/01/2018   • DIABETIC FOOT EXAM  07/07/2021   • ANNUAL PHYSICAL  03/02/2022    12 hour fast for labs    Call after antibiotics complete and will decide if needs more

## 2022-06-23 ENCOUNTER — APPOINTMENT (OUTPATIENT)
Dept: CARDIOLOGY | Facility: HOSPITAL | Age: 62
End: 2022-06-23

## 2022-06-24 RX ORDER — DILTIAZEM HYDROCHLORIDE 300 MG/1
CAPSULE, EXTENDED RELEASE ORAL
Qty: 90 CAPSULE | Refills: 0 | Status: SHIPPED | OUTPATIENT
Start: 2022-06-24 | End: 2022-09-20

## 2022-07-01 ENCOUNTER — TELEPHONE (OUTPATIENT)
Dept: FAMILY MEDICINE CLINIC | Facility: CLINIC | Age: 62
End: 2022-07-01

## 2022-07-01 ENCOUNTER — HOSPITAL ENCOUNTER (OUTPATIENT)
Dept: CARDIOLOGY | Facility: HOSPITAL | Age: 62
Discharge: HOME | End: 2022-07-01
Admitting: PREVENTIVE MEDICINE

## 2022-07-01 DIAGNOSIS — I65.23 CAROTID STENOSIS, ASYMPTOMATIC, BILATERAL: ICD-10-CM

## 2022-07-01 LAB
BH CV XLRA MEAS LEFT DIST CCA EDV: -15.4 CM/SEC
BH CV XLRA MEAS LEFT DIST CCA PSV: -78.5 CM/SEC
BH CV XLRA MEAS LEFT DIST ICA EDV: -27.4 CM/SEC
BH CV XLRA MEAS LEFT DIST ICA PSV: -81.8 CM/SEC
BH CV XLRA MEAS LEFT ICA/CCA RATIO: -0.76
BH CV XLRA MEAS LEFT PROX CCA EDV: 14.1 CM/SEC
BH CV XLRA MEAS LEFT PROX CCA PSV: 108 CM/SEC
BH CV XLRA MEAS LEFT PROX ECA PSV: -91.6 CM/SEC
BH CV XLRA MEAS LEFT PROX ICA EDV: -16.5 CM/SEC
BH CV XLRA MEAS LEFT PROX ICA PSV: -78.5 CM/SEC
BH CV XLRA MEAS LEFT PROX SCLA PSV: -147 CM/SEC
BH CV XLRA MEAS LEFT VERTEBRAL A EDV: -9.9 CM/SEC
BH CV XLRA MEAS LEFT VERTEBRAL A PSV: -54.3 CM/SEC
BH CV XLRA MEAS RIGHT DIST CCA EDV: 19.3 CM/SEC
BH CV XLRA MEAS RIGHT DIST CCA PSV: 85.1 CM/SEC
BH CV XLRA MEAS RIGHT DIST ICA EDV: -20.1 CM/SEC
BH CV XLRA MEAS RIGHT DIST ICA PSV: -80.1 CM/SEC
BH CV XLRA MEAS RIGHT ICA/CCA RATIO: -0.94
BH CV XLRA MEAS RIGHT PROX CCA EDV: 16.2 CM/SEC
BH CV XLRA MEAS RIGHT PROX CCA PSV: 97.5 CM/SEC
BH CV XLRA MEAS RIGHT PROX ECA PSV: -111 CM/SEC
BH CV XLRA MEAS RIGHT PROX ICA EDV: -15.7 CM/SEC
BH CV XLRA MEAS RIGHT PROX ICA PSV: -70.2 CM/SEC
BH CV XLRA MEAS RIGHT PROX SCLA PSV: 142 CM/SEC
BH CV XLRA MEAS RIGHT VERTEBRAL A EDV: -16.7 CM/SEC
BH CV XLRA MEAS RIGHT VERTEBRAL A PSV: -80.6 CM/SEC
LEFT ARM BP: NORMAL MMHG
MAXIMAL PREDICTED HEART RATE: 159 BPM
RIGHT ARM BP: NORMAL MMHG
STRESS TARGET HR: 135 BPM

## 2022-07-01 PROCEDURE — 93880 EXTRACRANIAL BILAT STUDY: CPT

## 2022-07-01 NOTE — TELEPHONE ENCOUNTER
HUB TO READ:  ----- Message from Kavitha Teran MD sent at 7/1/2022  1:01 PM EDT -----  Carotid Doppler still shows 16 to 49% blockage on both sides we will continue to monitor.

## 2022-07-05 RX ORDER — HYDROCHLOROTHIAZIDE 12.5 MG/1
CAPSULE, GELATIN COATED ORAL
Qty: 90 CAPSULE | Refills: 0 | Status: SHIPPED | OUTPATIENT
Start: 2022-07-05 | End: 2022-07-07

## 2022-07-05 RX ORDER — EMPAGLIFLOZIN 25 MG/1
TABLET, FILM COATED ORAL
Qty: 30 TABLET | Refills: 3 | Status: SHIPPED | OUTPATIENT
Start: 2022-07-05 | End: 2022-11-07

## 2022-07-07 RX ORDER — HYDROCHLOROTHIAZIDE 12.5 MG/1
CAPSULE, GELATIN COATED ORAL
Qty: 90 CAPSULE | Refills: 0 | Status: SHIPPED | OUTPATIENT
Start: 2022-07-07 | End: 2022-09-20 | Stop reason: SDUPTHER

## 2022-08-01 ENCOUNTER — OFFICE VISIT (OUTPATIENT)
Dept: FAMILY MEDICINE CLINIC | Facility: CLINIC | Age: 62
End: 2022-08-01

## 2022-08-01 VITALS
HEART RATE: 93 BPM | HEIGHT: 66 IN | DIASTOLIC BLOOD PRESSURE: 80 MMHG | BODY MASS INDEX: 33.88 KG/M2 | OXYGEN SATURATION: 93 % | TEMPERATURE: 96.9 F | WEIGHT: 210.8 LBS | SYSTOLIC BLOOD PRESSURE: 137 MMHG

## 2022-08-01 DIAGNOSIS — E11.65 TYPE 2 DIABETES MELLITUS WITH HYPERGLYCEMIA, WITHOUT LONG-TERM CURRENT USE OF INSULIN: ICD-10-CM

## 2022-08-01 DIAGNOSIS — Z12.11 SCREENING FOR COLON CANCER: ICD-10-CM

## 2022-08-01 DIAGNOSIS — I10 ESSENTIAL HYPERTENSION: ICD-10-CM

## 2022-08-01 DIAGNOSIS — E66.09 CLASS 1 OBESITY DUE TO EXCESS CALORIES WITHOUT SERIOUS COMORBIDITY WITH BODY MASS INDEX (BMI) OF 34.0 TO 34.9 IN ADULT: ICD-10-CM

## 2022-08-01 DIAGNOSIS — R05.9 COUGH: Primary | ICD-10-CM

## 2022-08-01 PROCEDURE — 99213 OFFICE O/P EST LOW 20 MIN: CPT | Performed by: PREVENTIVE MEDICINE

## 2022-08-01 PROCEDURE — U0004 COV-19 TEST NON-CDC HGH THRU: HCPCS | Performed by: PREVENTIVE MEDICINE

## 2022-08-01 NOTE — PATIENT INSTRUCTIONS
Rest and drink plenty of fluids and quarantine and wear maskl till results of Covid PCR known  Continue cough meds and if negative Covid will send Amoxicillin tomorrow.    Check blood pressure cuff for accuracy and send 10 blood pressures over 2 weeks.  Watch sodium, alcohol and weight

## 2022-08-01 NOTE — PROGRESS NOTES
"Subjective   Dawson Lomax is a 61 y.o. male presents for   Chief Complaint   Patient presents with   • Cough     Since thursday   • Sinus Problem     Since thursday     Patient states that he has had a 4-day history of sinus congestion cough no fever producing clear sputum to others in the family ill with the same.  Negative home COVID test x2 grandchild negative PCR test for COVID couple of days ago.  Patient has not had a fever diarrhea dysuria.  Blood pressures not well controlled so he will home monitor and send us the results he was again encouraged to get his screening for colorectal cancer  Health Maintenance Due   Topic Date Due   • COLORECTAL CANCER SCREENING  Never done   • COVID-19 Vaccine (1) Never done   • TDAP/TD VACCINES (1 - Tdap) Never done   • ZOSTER VACCINE (1 of 2) Never done   • Pneumococcal Vaccine 0-64 (3 - PPSV23 or PCV20) 01/01/2018   • PROSTATE CANCER SCREENING  07/13/2022       History of Present Illness     Vitals:    08/01/22 1329 08/01/22 1330 08/01/22 1331   BP: 131/87 150/86 137/80   BP Location: Right arm Left arm Right arm   Patient Position: Sitting Sitting Standing   Cuff Size: Adult Adult Adult   Pulse: 92  93   Temp: 96.9 °F (36.1 °C)     TempSrc: Temporal     SpO2: 94%  93%   Weight: 95.6 kg (210 lb 12.8 oz)     Height: 167.6 cm (66\")       Body mass index is 34.02 kg/m².    Current Outpatient Medications on File Prior to Visit   Medication Sig Dispense Refill   • fenofibrate (TRICOR) 145 MG tablet Take 1 tablet by mouth once daily 90 tablet 3   • glyburide (DIAbeta) 5 MG tablet Take 2 tablets by mouth twice daily 120 tablet 3   • hydroCHLOROthiazide (MICROZIDE) 12.5 MG capsule Take 1 capsule by mouth once daily 90 capsule 0   • ibuprofen (ADVIL,MOTRIN) 600 MG tablet TAKE 1 TABLET (600 MG) BY MOUTH ONCE DAILY FOR 30 DAYS     • ipratropium-albuterol (DUO-NEB) 0.5-2.5 mg/3 ml nebulizer Take 3 mL by nebulization Every 4 (Four) Hours As Needed for Wheezing. 360 mL 3   • " Jardiance 25 MG tablet tablet Take 1 tablet by mouth once daily 30 tablet 3   • LYRICA 100 MG capsule Take 100 mg by mouth 2 (Two) Times a Day As Needed (patient states only takes as needed).  0   • metFORMIN ER (GLUCOPHAGE-XR) 500 MG 24 hr tablet Take 2 tablets by mouth 2 (Two) Times a Day. 180 tablet 6   • metoprolol tartrate (LOPRESSOR) 25 MG tablet Take 25 mg by mouth 2 (Two) Times a Day.     • oxyCODONE (ROXICODONE) 10 MG tablet Take 10 mg by mouth Every 6 (Six) Hours As Needed.     • pravastatin (Pravachol) 40 MG tablet Take 1 tablet by mouth Daily. Per Ins prefers Pravastatin over Atorvastatin. 30 tablet 5   • Semaglutide, 2 MG/DOSE, (Ozempic, 2 MG/DOSE,) 8 MG/3ML solution pen-injector Inject 2 mg under the skin into the appropriate area as directed Every 7 (Seven) Days. 3 mL 11   • tadalafil (Cialis) 10 MG tablet Take 1 tablet p.o. as needed daily. 20 tablet 5   • aspirin 81 MG EC tablet Take 81 mg by mouth 3 (Three) Times a Week.     • dilTIAZem (TIAZAC) 300 MG 24 hr capsule Take 1 capsule by mouth once daily 90 capsule 0   • [DISCONTINUED] doxycycline (VIBRAMYCIN) 100 MG capsule Take 1 capsule by mouth 2 (Two) Times a Day. 20 capsule 0     No current facility-administered medications on file prior to visit.       The following portions of the patient's history were reviewed and updated as appropriate: allergies, current medications, past family history, past medical history, past social history, past surgical history and problem list.    Review of Systems   HENT: Positive for congestion, postnasal drip, rhinorrhea and sinus pressure.    Respiratory: Positive for cough.        Objective   Physical Exam  Vitals reviewed.   Constitutional:       General: He is not in acute distress.     Appearance: He is well-developed. He is obese. He is not ill-appearing or toxic-appearing.   HENT:      Head: Normocephalic and atraumatic.      Right Ear: Tympanic membrane, ear canal and external ear normal.      Left Ear:  Tympanic membrane, ear canal and external ear normal.      Nose: Nose normal.   Eyes:      Extraocular Movements: Extraocular movements intact.      Conjunctiva/sclera: Conjunctivae normal.      Pupils: Pupils are equal, round, and reactive to light.   Cardiovascular:      Rate and Rhythm: Normal rate and regular rhythm.      Heart sounds: Normal heart sounds.   Pulmonary:      Effort: Pulmonary effort is normal.      Breath sounds: Normal breath sounds.   Abdominal:      General: Bowel sounds are normal. There is no distension.      Palpations: Abdomen is soft. There is no mass.      Tenderness: There is no abdominal tenderness.   Musculoskeletal:         General: Normal range of motion.      Cervical back: Neck supple.   Skin:     General: Skin is warm.   Neurological:      General: No focal deficit present.      Mental Status: He is alert and oriented to person, place, and time.   Psychiatric:         Mood and Affect: Mood normal.         Behavior: Behavior normal.       PHQ-9 Total Score:      Assessment & Plan   Diagnoses and all orders for this visit:    1. Cough (Primary)  Comments:  4-day history of cough producing clear sputum no fever.  2 others and family ill with same negative home COVID test twice PCR test pending.  Orders:  -     COVID-19,APTIMA PANTHER([facility]),[facility], NP/OP SWAB IN UTM/VTM/SALINE TRANSPORT MEDIA,24 HR TAT - Swab, Nasopharynx; Future  -     COVID-19,APTIMA PANTHER([facility]),[facility], NP/OP SWAB IN UTM/VTM/SALINE TRANSPORT MEDIA,24 HR TAT - Swab, Nasopharynx    2. Essential hypertension  Comments:  Not controlled-will home monitor    3. Type 2 diabetes mellitus with hyperglycemia, without long-term current use of insulin (HCC)  Comments:  Patient states that his blood sugars have been under fair control.    4. Screening for colon cancer  Comments:  Patient advised to get his colorectal cancer screening done.    5. Class 1 obesity due to excess calories without serious comorbidity  with body mass index (BMI) of 34.0 to 34.9 in adult        Patient Instructions   Rest and drink plenty of fluids and quarantine and wear maskl till results of Covid PCR known  Continue cough meds and if negative Covid will send Amoxicillin tomorrow.    Check blood pressure cuff for accuracy and send 10 blood pressures over 2 weeks.  Watch sodium, alcohol and weight

## 2022-08-02 ENCOUNTER — TELEPHONE (OUTPATIENT)
Dept: FAMILY MEDICINE CLINIC | Facility: CLINIC | Age: 62
End: 2022-08-02

## 2022-08-02 LAB — SARS-COV-2 ORF1AB RESP QL NAA+PROBE: NOT DETECTED

## 2022-08-02 RX ORDER — AMOXICILLIN 875 MG/1
875 TABLET, COATED ORAL 2 TIMES DAILY
Qty: 20 TABLET | Refills: 0 | Status: SHIPPED | OUTPATIENT
Start: 2022-08-02 | End: 2022-09-20

## 2022-08-02 NOTE — TELEPHONE ENCOUNTER
HUB TO READ  ----- Message from Kavitha Teran MD sent at 8/2/2022  7:13 AM EDT -----  No signs of any COVID.  We did send amoxicillin 875 twice daily for 10 days to the pharmacy if nasal discharge thickens up or starts to look yellow in color this may be beneficial.  Rest drink plenty of fluids call if any other questions or concerns

## 2022-08-02 NOTE — PROGRESS NOTES
No signs of any COVID.  We did send amoxicillin 875 twice daily for 10 days to the pharmacy if nasal discharge thickens up or starts to look yellow in color this may be beneficial.  Rest drink plenty of fluids call if any other questions or concerns

## 2022-08-18 RX ORDER — PRAVASTATIN SODIUM 40 MG
TABLET ORAL
Qty: 30 TABLET | Refills: 6 | Status: SHIPPED | OUTPATIENT
Start: 2022-08-18 | End: 2023-02-20

## 2022-08-29 RX ORDER — GLYBURIDE 5 MG/1
TABLET ORAL
Qty: 360 TABLET | Refills: 0 | Status: SHIPPED | OUTPATIENT
Start: 2022-08-29

## 2022-09-19 NOTE — PATIENT INSTRUCTIONS
Health Maintenance Due   Topic Date Due    COLORECTAL CANCER SCREENING  Never done    COVID-19 Vaccine (1) Never done    TDAP/TD VACCINES (1 - Tdap) Never done    ZOSTER VACCINE (1 of 2) Never done    Pneumococcal Vaccine 0-64 (3 - PPSV23 or PCV20) 01/01/2018    PROSTATE CANCER SCREENING  07/13/2022    12 hour fast for labs    Call about Metoprolol dose and strength.    Call our office Friday if no call from carotid doppler or general surgeon

## 2022-09-20 ENCOUNTER — OFFICE VISIT (OUTPATIENT)
Dept: FAMILY MEDICINE CLINIC | Facility: CLINIC | Age: 62
End: 2022-09-20

## 2022-09-20 ENCOUNTER — TELEPHONE (OUTPATIENT)
Dept: FAMILY MEDICINE CLINIC | Facility: CLINIC | Age: 62
End: 2022-09-20

## 2022-09-20 VITALS
OXYGEN SATURATION: 96 % | SYSTOLIC BLOOD PRESSURE: 168 MMHG | DIASTOLIC BLOOD PRESSURE: 77 MMHG | BODY MASS INDEX: 34.68 KG/M2 | TEMPERATURE: 97.6 F | WEIGHT: 215.8 LBS | HEART RATE: 77 BPM | HEIGHT: 66 IN

## 2022-09-20 DIAGNOSIS — Z12.5 SCREENING FOR PROSTATE CANCER: ICD-10-CM

## 2022-09-20 DIAGNOSIS — Z12.11 SCREENING FOR COLON CANCER: ICD-10-CM

## 2022-09-20 DIAGNOSIS — E53.8 B12 DEFICIENCY: ICD-10-CM

## 2022-09-20 DIAGNOSIS — K43.2 INCISIONAL HERNIA, WITHOUT OBSTRUCTION OR GANGRENE: ICD-10-CM

## 2022-09-20 DIAGNOSIS — R00.0 TACHYCARDIA: ICD-10-CM

## 2022-09-20 DIAGNOSIS — M10.9 GOUT, UNSPECIFIED CAUSE, UNSPECIFIED CHRONICITY, UNSPECIFIED SITE: Chronic | ICD-10-CM

## 2022-09-20 DIAGNOSIS — E66.09 CLASS 1 OBESITY DUE TO EXCESS CALORIES WITHOUT SERIOUS COMORBIDITY WITH BODY MASS INDEX (BMI) OF 34.0 TO 34.9 IN ADULT: ICD-10-CM

## 2022-09-20 DIAGNOSIS — I10 ESSENTIAL HYPERTENSION: Primary | ICD-10-CM

## 2022-09-20 DIAGNOSIS — E55.9 VITAMIN D DEFICIENCY: ICD-10-CM

## 2022-09-20 DIAGNOSIS — I25.10 CORONARY ARTERY DISEASE INVOLVING NATIVE CORONARY ARTERY OF NATIVE HEART WITHOUT ANGINA PECTORIS: ICD-10-CM

## 2022-09-20 DIAGNOSIS — E78.2 MIXED HYPERLIPIDEMIA: ICD-10-CM

## 2022-09-20 DIAGNOSIS — J38.3 LESION OF VOCAL CORD: ICD-10-CM

## 2022-09-20 PROBLEM — H52.4 PRESBYOPIA: Status: ACTIVE | Noted: 2022-07-22

## 2022-09-20 PROBLEM — Z96.1 PSEUDOPHAKIA: Status: ACTIVE | Noted: 2022-07-22

## 2022-09-20 PROBLEM — H43.399 VITREOUS FLOATERS: Status: ACTIVE | Noted: 2022-07-22

## 2022-09-20 PROCEDURE — 99214 OFFICE O/P EST MOD 30 MIN: CPT | Performed by: PREVENTIVE MEDICINE

## 2022-09-20 RX ORDER — HYDROCHLOROTHIAZIDE 12.5 MG/1
12.5 CAPSULE, GELATIN COATED ORAL DAILY
Qty: 90 CAPSULE | Refills: 0 | Status: SHIPPED | OUTPATIENT
Start: 2022-09-20 | End: 2022-09-23 | Stop reason: SDUPTHER

## 2022-09-20 RX ORDER — CYCLOSPORINE 0.5 MG/ML
EMULSION OPHTHALMIC
COMMUNITY
Start: 2022-07-28 | End: 2023-01-27

## 2022-09-20 RX ORDER — DILTIAZEM HYDROCHLORIDE 360 MG/1
360 CAPSULE, EXTENDED RELEASE ORAL DAILY
Qty: 90 CAPSULE | Refills: 1 | Status: SHIPPED | OUTPATIENT
Start: 2022-09-20 | End: 2022-09-23 | Stop reason: SDUPTHER

## 2022-09-20 NOTE — TELEPHONE ENCOUNTER
Caller: Dawson Lomax    Relationship: Self    Best call back number:[phone]     Requested Prescriptions: DILTIAZEM       Pharmacy where request should be sent:    [facility]/pharmacy #[id number] - [place], [place] - [address] - [phone]  - [phone] FX  [phone]    Additional details provided by patient: PATIENT IS A CALLING TO STATE HE IS NOT TAKING THE ABOVE MEDICATION.  HE STATES IF DR. BERMAN WANTS HIM TO TAKE HE WILL NEED A NEW PRESCRIPTION.    Does the patient have less than a 3 day supply:  [x] Yes  [] No    Myra Zamarripa, Yoanyd Rep   09/20/22 11:35 EDT   PLEASE ADVISE.

## 2022-09-20 NOTE — PROGRESS NOTES
"Subjective   Dawson Lomax is a 61 y.o. male presents for   Chief Complaint   Patient presents with   • Diabetes     Patient is not fasting     Patient presents today for follow-up on multiple chronic health conditions most of which are not stable his blood pressure was not controlled he is not sure of what medicines he is taking we did increase the diltiazem to 360 because he does tell us he is taking the diltiazem 300.  Apparently he is not taking any metoprololAnd we have asked him to bring his medicines in so that we can get our list correct.  He does have a ventral hernia and the warning signs of strangulation were discussed.  He has not been having any shortness of breath but has noticed chest pain that lasted about a half an hour without diaphoresis and is nonradiating that did not occur with activity.  We have advised that he follow-up with the cardiologist as soon as possible.He may have a relative that had cancer or has had some polyps at any rate he should be screened by: Anoscopy and he is chosen Cologuard despite being told that that is not proper.  COVID booster was advised  Health Maintenance Due   Topic Date Due   • COLORECTAL CANCER SCREENING  Never done   • COVID-19 Vaccine (1) Never done   • TDAP/TD VACCINES (1 - Tdap) Never done   • ZOSTER VACCINE (1 of 2) Never done   • Pneumococcal Vaccine 0-64 (3 - PPSV23 or PCV20) 01/01/2018   • PROSTATE CANCER SCREENING  07/13/2022       History of Present Illness     Vitals:    09/20/22 0948 09/20/22 0951 09/20/22 0952   BP: 155/80 154/83 168/77   BP Location: Right arm Left arm Left arm   Patient Position: Sitting Sitting Standing   Cuff Size: Adult Adult Adult   Pulse: 74  77   Temp: 97.6 °F (36.4 °C)     TempSrc: Temporal     SpO2: 96%     Weight: 97.9 kg (215 lb 12.8 oz)     Height: 167.6 cm (65.98\")       Body mass index is 34.85 kg/m².    Current Outpatient Medications on File Prior to Visit   Medication Sig Dispense Refill   • aspirin 81 MG EC " tablet Take 81 mg by mouth 3 (Three) Times a Week.     • cycloSPORINE (RESTASIS) 0.05 % ophthalmic emulsion igtt OU BID     • glyburide (DIAbeta) 5 MG tablet Take 2 tablets by mouth twice daily 360 tablet 0   • ibuprofen (ADVIL,MOTRIN) 600 MG tablet TAKE 1 TABLET (600 MG) BY MOUTH ONCE DAILY FOR 30 DAYS     • ipratropium-albuterol (DUO-NEB) 0.5-2.5 mg/3 ml nebulizer Take 3 mL by nebulization Every 4 (Four) Hours As Needed for Wheezing. 360 mL 3   • Jardiance 25 MG tablet tablet Take 1 tablet by mouth once daily 30 tablet 3   • LYRICA 100 MG capsule Take 100 mg by mouth 2 (Two) Times a Day As Needed (patient states only takes as needed).  0   • metFORMIN ER (GLUCOPHAGE-XR) 500 MG 24 hr tablet Take 2 tablets by mouth 2 (Two) Times a Day. 180 tablet 6   • metoprolol tartrate (LOPRESSOR) 25 MG tablet Take 25 mg by mouth 2 (Two) Times a Day.     • oxyCODONE (ROXICODONE) 10 MG tablet Take 10 mg by mouth Every 6 (Six) Hours As Needed.     • pravastatin (PRAVACHOL) 40 MG tablet Take 1 tablet by mouth once daily 30 tablet 6   • Semaglutide, 2 MG/DOSE, (Ozempic, 2 MG/DOSE,) 8 MG/3ML solution pen-injector Inject 2 mg under the skin into the appropriate area as directed Every 7 (Seven) Days. 3 mL 11   • tadalafil (Cialis) 10 MG tablet Take 1 tablet p.o. as needed daily. 20 tablet 5   • [DISCONTINUED] dilTIAZem (TIAZAC) 300 MG 24 hr capsule Take 1 capsule by mouth once daily 90 capsule 0   • [DISCONTINUED] hydroCHLOROthiazide (MICROZIDE) 12.5 MG capsule Take 1 capsule by mouth once daily 90 capsule 0   • [DISCONTINUED] amoxicillin (AMOXIL) 875 MG tablet Take 1 tablet by mouth 2 (Two) Times a Day. 20 tablet 0   • [DISCONTINUED] fenofibrate (TRICOR) 145 MG tablet Take 1 tablet by mouth once daily 90 tablet 3     No current facility-administered medications on file prior to visit.       The following portions of the patient's history were reviewed and updated as appropriate: allergies, current medications, past family history, past  medical history, past social history, past surgical history and problem list.    Review of Systems   Cardiovascular: Positive for chest pain.   Musculoskeletal: Positive for arthralgias and myalgias.       Objective   Physical Exam  Vitals reviewed.   Constitutional:       General: He is not in acute distress.     Appearance: He is well-developed. He is obese. He is not ill-appearing or toxic-appearing.   HENT:      Head: Normocephalic and atraumatic.      Right Ear: Tympanic membrane, ear canal and external ear normal.      Left Ear: Tympanic membrane, ear canal and external ear normal.      Nose: Nose normal.      Mouth/Throat:      Mouth: Mucous membranes are moist.      Pharynx: No posterior oropharyngeal erythema.   Eyes:      Extraocular Movements: Extraocular movements intact.      Conjunctiva/sclera: Conjunctivae normal.      Pupils: Pupils are equal, round, and reactive to light.   Cardiovascular:      Rate and Rhythm: Normal rate and regular rhythm.      Pulses:           Dorsalis pedis pulses are 1+ on the right side and 1+ on the left side.        Posterior tibial pulses are 1+ on the right side and 1+ on the left side.      Heart sounds: Normal heart sounds.   Pulmonary:      Effort: Pulmonary effort is normal.      Comments: Decreased breath sounds bilaterally  Abdominal:      General: Bowel sounds are normal. There is no distension.      Palpations: Abdomen is soft. There is no mass.      Tenderness: There is no abdominal tenderness.      Hernia: A hernia is present.   Musculoskeletal:         General: Tenderness present.      Cervical back: Neck supple.   Feet:      Right foot:      Protective Sensation: 10 sites tested. 6 sites sensed.      Skin integrity: Skin breakdown present.      Toenail Condition: Right toenails are abnormally thick.      Left foot:      Protective Sensation: 10 sites tested. 7 sites sensed.      Skin integrity: Skin breakdown present.      Toenail Condition: Left toenails are  long.      Comments: Diabetic Foot Exam Performed and Monofilament Test Performed    Skin:     General: Skin is warm.   Neurological:      General: No focal deficit present.      Mental Status: He is alert and oriented to person, place, and time.   Psychiatric:         Mood and Affect: Mood normal.         Behavior: Behavior normal.       PHQ-9 Total Score:      Assessment & Plan   Diagnoses and all orders for this visit:    1. Essential hypertension (Primary)  Comments:  Not controlled Diltiazem increased to 360    2. Screening for prostate cancer  -     PSA Screen; Future    3. Screening for colon cancer  Comments:  Patient agreed to colonoscopy and then at the last minute said he was going to do Cologuard we are concerned that he might not be a candidate for that due to po  Orders:  -     Ambulatory Referral For Screening Colonoscopy    4. B12 deficiency  Comments:  Not taking    5. Coronary artery disease involving native coronary artery of native heart without angina pectoris  Comments:  Has had some chest pain 1/2 hour no diaphoresis and none radiating    6. Gout, unspecified cause, unspecified chronicity, unspecified site  Comments:  No inflammatory arthritis just aches all over and no history of kidney stones recently    7. Lesion of vocal cord  Comments:  ok per ENT    8. Mixed hyperlipidemia  Comments:  Watching sat fats    9. Tachycardia  Comments:  None noted    10. Vitamin D deficiency  Comments:  Not taking    11. Incisional hernia, without obstruction or gangrene  Comments:  Ventral hernia warning signs of strangulation discussed patient will consider repair in the winter.  Let us know if wants referral to general surgeon  Orders:  -     Cancel: Ambulatory Referral to General Surgery  -     Cancel: Ambulatory Referral to General Surgery  -     Ambulatory Referral to General Surgery    12. Class 1 obesity due to excess calories without serious comorbidity with body mass index (BMI) of 34.0 to 34.9 in  adult    Other orders  -     Cancel: Pneumococcal Conjugate Vaccine 20-Valent (PCV20)  -     hydroCHLOROthiazide (MICROZIDE) 12.5 MG capsule; Take 1 capsule by mouth Daily.  Dispense: 90 capsule; Refill: 0  -     dilTIAZem CD (Cardizem CD) 360 MG 24 hr capsule; Take 1 capsule by mouth Daily.  Dispense: 90 capsule; Refill: 1        Patient Instructions     Health Maintenance Due   Topic Date Due   • COLORECTAL CANCER SCREENING  Never done   • COVID-19 Vaccine (1) Never done   • TDAP/TD VACCINES (1 - Tdap) Never done   • ZOSTER VACCINE (1 of 2) Never done   • Pneumococcal Vaccine 0-64 (3 - PPSV23 or PCV20) 01/01/2018   • PROSTATE CANCER SCREENING  07/13/2022    12 hour fast for labs    Call about Metoprolol dose and strength.    Call our office Friday if no call from carotid doppler or general surgeon

## 2022-09-23 RX ORDER — HYDROCHLOROTHIAZIDE 12.5 MG/1
12.5 CAPSULE, GELATIN COATED ORAL DAILY
Qty: 90 CAPSULE | Refills: 0 | Status: SHIPPED | OUTPATIENT
Start: 2022-09-23 | End: 2023-03-14

## 2022-09-23 RX ORDER — DILTIAZEM HYDROCHLORIDE 360 MG/1
360 CAPSULE, EXTENDED RELEASE ORAL DAILY
Qty: 90 CAPSULE | Refills: 1 | Status: SHIPPED | OUTPATIENT
Start: 2022-09-23 | End: 2022-12-27 | Stop reason: SDUPTHER

## 2022-09-23 NOTE — TELEPHONE ENCOUNTER
Caller: Dawson Lomax    Relationship: Self    Best call back number: [phone]    Requested Prescriptions:   Requested Prescriptions     Pending Prescriptions Disp Refills   • hydroCHLOROthiazide (MICROZIDE) 12.5 MG capsule 90 capsule 0     Sig: Take 1 capsule by mouth Daily.   • dilTIAZem CD (Cardizem CD) 360 MG 24 hr capsule 90 capsule 1     Sig: Take 1 capsule by mouth Daily.        Pharmacy where request should be sent: [facility] #[id number] - [place], [place] - [address] [facility] [phone] [facility] [phone] FX     Additional details provided by patient: PATIENT STATES HE HAS 2 DAYS LEFT OF THE dilTIAZem CD (Cardizem CD) 360 MG 24 hr capsule     PATIENT STATES THAT THE hydroCHLOROthiazide (MICROZIDE) 12.5 MG capsule IS A NEW PRESCRIPTION AND THAT THERE IS ONE MORE PRESCRIPTION HE IS MISSING THAT IS ALSO A NEW PRESCRIPTION (HE DOES NOT REMEMBER THE NAME OF).     Does the patient have less than a 3 day supply:  [x] Yes  [] No    Yoandy Hopper Rep   09/23/22 15:57 EDT

## 2022-09-29 ENCOUNTER — CLINICAL SUPPORT (OUTPATIENT)
Dept: FAMILY MEDICINE CLINIC | Facility: CLINIC | Age: 62
End: 2022-09-29

## 2022-09-29 DIAGNOSIS — Z12.5 SCREENING FOR PROSTATE CANCER: ICD-10-CM

## 2022-09-29 LAB
25(OH)D3 SERPL-MCNC: 22.7 NG/ML (ref 30–100)
ALBUMIN SERPL-MCNC: 4 G/DL (ref 3.5–5.2)
ALBUMIN UR-MCNC: 12.6 MG/DL
ALBUMIN/GLOB SERPL: 1.3 G/DL
ALP SERPL-CCNC: 68 U/L (ref 39–117)
ALT SERPL W P-5'-P-CCNC: 26 U/L (ref 1–41)
ANION GAP SERPL CALCULATED.3IONS-SCNC: 11.1 MMOL/L (ref 5–15)
AST SERPL-CCNC: 20 U/L (ref 1–40)
BILIRUB SERPL-MCNC: 0.4 MG/DL (ref 0–1.2)
BUN SERPL-MCNC: 14 MG/DL (ref 8–23)
BUN/CREAT SERPL: 19.7 (ref 7–25)
CALCIUM SPEC-SCNC: 8.7 MG/DL (ref 8.6–10.5)
CHLORIDE SERPL-SCNC: 101 MMOL/L (ref 98–107)
CHOLEST SERPL-MCNC: 176 MG/DL (ref 0–200)
CO2 SERPL-SCNC: 24.9 MMOL/L (ref 22–29)
CREAT SERPL-MCNC: 0.71 MG/DL (ref 0.76–1.27)
CREAT UR-MCNC: 39.6 MG/DL
EGFRCR SERPLBLD CKD-EPI 2021: 104.4 ML/MIN/1.73
GLOBULIN UR ELPH-MCNC: 3.2 GM/DL
GLUCOSE SERPL-MCNC: 170 MG/DL (ref 65–99)
HBA1C MFR BLD: 8.3 % (ref 3.5–5.6)
HDLC SERPL-MCNC: 31 MG/DL (ref 40–60)
LDLC SERPL CALC-MCNC: 77 MG/DL (ref 0–100)
LDLC/HDLC SERPL: 1.95 {RATIO}
MICROALBUMIN/CREAT UR: 318.2 MG/G
POTASSIUM SERPL-SCNC: 4 MMOL/L (ref 3.5–5.2)
PROT SERPL-MCNC: 7.2 G/DL (ref 6–8.5)
PSA SERPL-MCNC: 0.82 NG/ML (ref 0–4)
SODIUM SERPL-SCNC: 137 MMOL/L (ref 136–145)
TRIGL SERPL-MCNC: 423 MG/DL (ref 0–150)
TSH SERPL DL<=0.05 MIU/L-ACNC: 2.71 UIU/ML (ref 0.27–4.2)
URATE SERPL-MCNC: 5.4 MG/DL (ref 3.4–7)
VIT B12 BLD-MCNC: 532 PG/ML (ref 211–946)
VLDLC SERPL-MCNC: 68 MG/DL (ref 5–40)

## 2022-09-29 PROCEDURE — 84550 ASSAY OF BLOOD/URIC ACID: CPT | Performed by: PREVENTIVE MEDICINE

## 2022-09-29 PROCEDURE — 36415 COLL VENOUS BLD VENIPUNCTURE: CPT | Performed by: PREVENTIVE MEDICINE

## 2022-09-29 PROCEDURE — 82306 VITAMIN D 25 HYDROXY: CPT | Performed by: PREVENTIVE MEDICINE

## 2022-09-29 PROCEDURE — 82043 UR ALBUMIN QUANTITATIVE: CPT | Performed by: PREVENTIVE MEDICINE

## 2022-09-29 PROCEDURE — G0103 PSA SCREENING: HCPCS | Performed by: PREVENTIVE MEDICINE

## 2022-09-29 PROCEDURE — 82570 ASSAY OF URINE CREATININE: CPT | Performed by: PREVENTIVE MEDICINE

## 2022-09-29 PROCEDURE — 80061 LIPID PANEL: CPT | Performed by: PREVENTIVE MEDICINE

## 2022-09-29 PROCEDURE — 83036 HEMOGLOBIN GLYCOSYLATED A1C: CPT | Performed by: PREVENTIVE MEDICINE

## 2022-09-29 PROCEDURE — 82607 VITAMIN B-12: CPT | Performed by: PREVENTIVE MEDICINE

## 2022-09-29 PROCEDURE — 84443 ASSAY THYROID STIM HORMONE: CPT | Performed by: PREVENTIVE MEDICINE

## 2022-09-29 PROCEDURE — 80053 COMPREHEN METABOLIC PANEL: CPT | Performed by: PREVENTIVE MEDICINE

## 2022-09-29 NOTE — PROGRESS NOTES
Venipuncture Blood Specimen Collection  Venipuncture performed in right arm by Zenobia Lester MA with good hemostasis. Patient tolerated the procedure well without complications.   09/29/22   LUIS FERNANDO Bowser MD

## 2022-10-04 ENCOUNTER — TELEPHONE (OUTPATIENT)
Dept: FAMILY MEDICINE CLINIC | Facility: CLINIC | Age: 62
End: 2022-10-04

## 2022-10-04 NOTE — TELEPHONE ENCOUNTER
HUB TO READ:  ----- Message from Kavitha Teran MD sent at 10/4/2022  6:59 AM EDT -----    Glucose was 170 with A1c showing lack of control at 8.3 and you are already starting to spill some protein in your urine which is concerning for loss of kidney function in the future.  Please contact Dr. Leavitt to see if he wishes to change any medications.  Bad cholesterol is 77 goal is below 70 so watch saturated fats and increase walking.  Vitamin D is also low so try to increase 1000 units daily.  Call if any other questions or concerns.

## 2022-10-04 NOTE — TELEPHONE ENCOUNTER
His vitamin B12 level is normal if he has been taking he should continue it if he is not been taking it he does not need to start it at his level is normal.

## 2022-10-04 NOTE — TELEPHONE ENCOUNTER
Caller: Dawson Lomax    Relationship: Self    Best call back number: [phone]    What was the call regarding: HUB RELAYED MESSAGE TO PATIENT AND PATIENT SHOWED UNDERSTANDING. PATIENT WOULD LIKE TO KNOW IF HE CAN TAKE VITAMIN B. PLEASE ADVISE.    Do you require a callback: YES

## 2022-10-13 ENCOUNTER — OFFICE VISIT (OUTPATIENT)
Dept: SURGERY | Facility: CLINIC | Age: 62
End: 2022-10-13

## 2022-10-13 VITALS
DIASTOLIC BLOOD PRESSURE: 84 MMHG | TEMPERATURE: 98.2 F | HEART RATE: 84 BPM | SYSTOLIC BLOOD PRESSURE: 156 MMHG | BODY MASS INDEX: 34.87 KG/M2 | WEIGHT: 217 LBS | OXYGEN SATURATION: 92 % | HEIGHT: 66 IN

## 2022-10-13 DIAGNOSIS — K43.2 INCISIONAL HERNIA, WITHOUT OBSTRUCTION OR GANGRENE: Primary | ICD-10-CM

## 2022-10-13 DIAGNOSIS — M62.08 DIASTASIS RECTI: ICD-10-CM

## 2022-10-13 PROCEDURE — 99204 OFFICE O/P NEW MOD 45 MIN: CPT | Performed by: SURGERY

## 2022-10-13 NOTE — PROGRESS NOTES
CHIEF COMPLAINT:    Hernia    HISTORY OF PRESENT ILLNESS:    Dawson Lomax is a 61 y.o. male who has a prior history of a paramedian laparotomy as a child.  He also underwent umbilical hernia repair with mesh placement in 2005 or 2006 at [facility].  In approximately 2008 the patient began noting a bulge somewhat above and off midline from his umbilicus.  The area has increased in size since he first noted it.  He is also noted a larger bulge in the upper abdomen as well.  He has no obstructive symptoms.    Past Medical History:   Diagnosis Date   • B12 deficiency 2/2/2015   • Broken finger     broken middle finger   • Coronary artery disease    • Gout    • Heart attack (HCC) 2017   • Heart murmur    • Hx of migraines    • Plantar fasciitis, left 1/9/2017       Past Surgical History:   Procedure Laterality Date   • ANKLE SURGERY Left 2001   • CARDIAC CATHETERIZATION  2017   • CARPAL TUNNEL RELEASE Bilateral    • CATARACT EXTRACTION  2015   • CERVICAL SPINE SURGERY     • HERNIA REPAIR     • OTHER SURGICAL HISTORY  2017    MI with Stent 2010, 2017    • SPLENECTOMY         Prior to Admission medications    Medication Sig Start Date End Date Taking? Authorizing Provider   aspirin 81 MG EC tablet Take 81 mg by mouth 3 (Three) Times a Week.   Yes Janet Mcgrath MD   cycloSPORINE (RESTASIS) 0.05 % ophthalmic emulsion igtt OU BID 7/28/22 1/27/23 Yes Janet Mcgrath MD   dilTIAZem CD (Cardizem CD) 360 MG 24 hr capsule Take 1 capsule by mouth Daily. 9/23/22  Yes Kavitha Teran MD   glyburide (DIAbeta) 5 MG tablet Take 2 tablets by mouth twice daily 8/29/22  Yes Miki Man MD   hydroCHLOROthiazide (MICROZIDE) 12.5 MG capsule Take 1 capsule by mouth Daily. 9/23/22  Yes Kavitha Teran MD   ibuprofen (ADVIL,MOTRIN) 600 MG tablet TAKE 1 TABLET (600 MG) BY MOUTH ONCE DAILY FOR 30 DAYS 2/8/22  Yes Janet Mcgrath MD   ipratropium-albuterol (DUO-NEB) 0.5-2.5 mg/3 ml nebulizer Take 3 mL by  nebulization Every 4 (Four) Hours As Needed for Wheezing. 19  Yes Kavitha Teran MD   Jardiance 25 MG tablet tablet Take 1 tablet by mouth once daily 22  Yes Chen Welch MD   LYRICA 100 MG capsule Take 100 mg by mouth 2 (Two) Times a Day As Needed (patient states only takes as needed). 19  Yes Janet Mcgrath MD   metFORMIN ER (GLUCOPHAGE-XR) 500 MG 24 hr tablet Take 2 tablets by mouth 2 (Two) Times a Day. 22  Yes Miki Man MD   metoprolol tartrate (LOPRESSOR) 25 MG tablet Take 25 mg by mouth 2 (Two) Times a Day.   Yes Janet Mcgrath MD   oxyCODONE (ROXICODONE) 10 MG tablet Take 10 mg by mouth Every 6 (Six) Hours As Needed. 9/10/20  Yes Janet Mcgrath MD   pravastatin (PRAVACHOL) 40 MG tablet Take 1 tablet by mouth once daily 22  Yes Miki Man MD   Semaglutide, 2 MG/DOSE, (Ozempic, 2 MG/DOSE,) 8 MG/3ML solution pen-injector Inject 2 mg under the skin into the appropriate area as directed Every 7 (Seven) Days. 22  Yes Miki Man MD   tadalafil (Cialis) 10 MG tablet Take 1 tablet p.o. as needed daily. 4/14/22 10/13/22  Miki Man MD       Allergies   Allergen Reactions   • Gabapentin Dizziness       Family History   Problem Relation Age of Onset   • No Known Problems Mother    • Diabetes Father    • Diabetes Paternal Grandmother    • Cancer Other        Social History     Socioeconomic History   • Marital status:      Spouse name: Yesi   • Number of children: 1   • Years of education: 12   Tobacco Use   • Smoking status: Former     Packs/day: 0.50     Years: 5.00     Pack years: 2.50     Types: Cigarettes     Start date: 1990     Quit date:      Years since quittin.8   • Smokeless tobacco: Never   Vaping Use   • Vaping Use: Never used   Substance and Sexual Activity   • Alcohol use: No   • Drug use: No   • Sexual activity: Yes     Partners: Female     Birth control/protection: None       Review of Systems  "  Gastrointestinal: Negative for constipation, diarrhea, nausea and vomiting.        Protruding hernia       Objective     /84   Pulse 84   Temp 98.2 °F (36.8 °C) (Infrared)   Ht 167.6 cm (66\")   Wt 98.4 kg (217 lb)   SpO2 92%   BMI 35.02 kg/m²     Physical Exam  Constitutional:       General: He is not in acute distress.     Appearance: Normal appearance. He is obese. He is not ill-appearing, toxic-appearing or diaphoretic.   HENT:      Head: Normocephalic and atraumatic.   Eyes:      General: No scleral icterus.        Right eye: No discharge.         Left eye: No discharge.      Extraocular Movements: Extraocular movements intact.      Conjunctiva/sclera: Conjunctivae normal.   Pulmonary:      Effort: Pulmonary effort is normal. No respiratory distress.   Abdominal:      General: There is no distension.      Palpations: Abdomen is soft. There is no mass.      Tenderness: There is no abdominal tenderness. There is no guarding or rebound.      Hernia: A hernia is present.       Skin:     General: Skin is warm.      Coloration: Skin is not jaundiced.   Neurological:      General: No focal deficit present.      Mental Status: He is alert and oriented to person, place, and time.   Psychiatric:         Mood and Affect: Mood normal.         Behavior: Behavior normal.         Thought Content: Thought content normal.         Judgment: Judgment normal.             ASSESSMENT:    Incisional hernia at prior paramidline incision, possible recurrent umbilical hernia, diastases of rectus muscles    PLAN:    The patient has a reducible incisional hernia above his umbilicus.  Is unclear whether this is related directly to his umbilical hernia repair or not.  He may also have a small recurrence of his umbilical hernia.  The larger bulge that he has noted is diastases of his rectus muscles.  I discussed this with him as well.  I do not recommend any intervention for that.  However, he would like to have his hernia " repaired which I believe is reasonable.  I recommended minimally invasive approach to this.  The risks and benefits of robotic incisional hernia repair with mesh were discussed with him.  He has been scheduled.          This document has been electronically signed by Aric Bauer MD on October 13, 2022 10:05 EDT

## 2022-10-27 DIAGNOSIS — E11.65 TYPE 2 DIABETES MELLITUS WITH HYPERGLYCEMIA, WITHOUT LONG-TERM CURRENT USE OF INSULIN: ICD-10-CM

## 2022-10-28 RX ORDER — SEMAGLUTIDE 2.68 MG/ML
2 INJECTION, SOLUTION SUBCUTANEOUS
Qty: 3 ML | Refills: 0 | Status: SHIPPED | OUTPATIENT
Start: 2022-10-28 | End: 2022-11-10

## 2022-10-28 RX ORDER — SEMAGLUTIDE 1.34 MG/ML
INJECTION, SOLUTION SUBCUTANEOUS
Qty: 3 ML | Refills: 0 | OUTPATIENT
Start: 2022-10-28

## 2022-11-04 ENCOUNTER — LAB (OUTPATIENT)
Dept: LAB | Facility: HOSPITAL | Age: 62
End: 2022-11-04

## 2022-11-04 DIAGNOSIS — E11.65 TYPE 2 DIABETES MELLITUS WITH HYPERGLYCEMIA, WITHOUT LONG-TERM CURRENT USE OF INSULIN: ICD-10-CM

## 2022-11-04 LAB
ALBUMIN SERPL-MCNC: 4.1 G/DL (ref 3.5–5.2)
ALBUMIN UR-MCNC: 11.1 MG/DL
ALBUMIN/GLOB SERPL: 1.1 G/DL
ALP SERPL-CCNC: 80 U/L (ref 39–117)
ALT SERPL W P-5'-P-CCNC: 27 U/L (ref 1–41)
ANION GAP SERPL CALCULATED.3IONS-SCNC: 11 MMOL/L (ref 5–15)
AST SERPL-CCNC: 19 U/L (ref 1–40)
BILIRUB SERPL-MCNC: 0.5 MG/DL (ref 0–1.2)
BUN SERPL-MCNC: 16 MG/DL (ref 8–23)
BUN/CREAT SERPL: 18.6 (ref 7–25)
CALCIUM SPEC-SCNC: 9.9 MG/DL (ref 8.6–10.5)
CHLORIDE SERPL-SCNC: 101 MMOL/L (ref 98–107)
CHOLEST SERPL-MCNC: 194 MG/DL (ref 0–200)
CO2 SERPL-SCNC: 25 MMOL/L (ref 22–29)
CREAT SERPL-MCNC: 0.86 MG/DL (ref 0.76–1.27)
CREAT UR-MCNC: 23 MG/DL
EGFRCR SERPLBLD CKD-EPI 2021: 98.5 ML/MIN/1.73
GLOBULIN UR ELPH-MCNC: 3.8 GM/DL
GLUCOSE SERPL-MCNC: 278 MG/DL (ref 65–99)
HBA1C MFR BLD: 9.2 % (ref 3.5–5.6)
HDLC SERPL-MCNC: 29 MG/DL (ref 40–60)
LDLC SERPL CALC-MCNC: 78 MG/DL (ref 0–100)
LDLC/HDLC SERPL: 1.92 {RATIO}
MICROALBUMIN/CREAT UR: 482.6 MG/G
POTASSIUM SERPL-SCNC: 4.1 MMOL/L (ref 3.5–5.2)
PROT SERPL-MCNC: 7.9 G/DL (ref 6–8.5)
SODIUM SERPL-SCNC: 137 MMOL/L (ref 136–145)
TRIGL SERPL-MCNC: 546 MG/DL (ref 0–150)
VLDLC SERPL-MCNC: 87 MG/DL (ref 5–40)

## 2022-11-04 PROCEDURE — 36415 COLL VENOUS BLD VENIPUNCTURE: CPT

## 2022-11-04 PROCEDURE — 80061 LIPID PANEL: CPT

## 2022-11-04 PROCEDURE — 82043 UR ALBUMIN QUANTITATIVE: CPT

## 2022-11-04 PROCEDURE — 83036 HEMOGLOBIN GLYCOSYLATED A1C: CPT

## 2022-11-04 PROCEDURE — 82570 ASSAY OF URINE CREATININE: CPT

## 2022-11-04 PROCEDURE — 80053 COMPREHEN METABOLIC PANEL: CPT

## 2022-11-07 RX ORDER — EMPAGLIFLOZIN 25 MG/1
TABLET, FILM COATED ORAL
Qty: 90 TABLET | Refills: 0 | Status: SHIPPED | OUTPATIENT
Start: 2022-11-07 | End: 2022-12-12

## 2022-11-10 ENCOUNTER — OFFICE VISIT (OUTPATIENT)
Dept: ENDOCRINOLOGY | Facility: CLINIC | Age: 62
End: 2022-11-10

## 2022-11-10 ENCOUNTER — TELEPHONE (OUTPATIENT)
Dept: ENDOCRINOLOGY | Facility: CLINIC | Age: 62
End: 2022-11-10

## 2022-11-10 VITALS
OXYGEN SATURATION: 95 % | SYSTOLIC BLOOD PRESSURE: 168 MMHG | BODY MASS INDEX: 35.2 KG/M2 | DIASTOLIC BLOOD PRESSURE: 85 MMHG | WEIGHT: 219 LBS | HEIGHT: 66 IN | HEART RATE: 86 BPM

## 2022-11-10 DIAGNOSIS — N52.9 IMPOTENCE: ICD-10-CM

## 2022-11-10 DIAGNOSIS — E66.09 CLASS 2 OBESITY DUE TO EXCESS CALORIES WITHOUT SERIOUS COMORBIDITY WITH BODY MASS INDEX (BMI) OF 35.0 TO 35.9 IN ADULT: ICD-10-CM

## 2022-11-10 DIAGNOSIS — E78.2 MIXED HYPERLIPIDEMIA: ICD-10-CM

## 2022-11-10 DIAGNOSIS — I10 ESSENTIAL HYPERTENSION: ICD-10-CM

## 2022-11-10 DIAGNOSIS — E11.65 TYPE 2 DIABETES MELLITUS WITH HYPERGLYCEMIA, WITHOUT LONG-TERM CURRENT USE OF INSULIN: Primary | ICD-10-CM

## 2022-11-10 LAB — GLUCOSE BLDC GLUCOMTR-MCNC: 213 MG/DL (ref 70–105)

## 2022-11-10 PROCEDURE — 82962 GLUCOSE BLOOD TEST: CPT | Performed by: INTERNAL MEDICINE

## 2022-11-10 PROCEDURE — 99214 OFFICE O/P EST MOD 30 MIN: CPT | Performed by: INTERNAL MEDICINE

## 2022-11-10 RX ORDER — LOSARTAN POTASSIUM 50 MG/1
50 TABLET ORAL DAILY
Qty: 30 TABLET | Refills: 11 | Status: SHIPPED | OUTPATIENT
Start: 2022-11-10 | End: 2023-11-10

## 2022-11-10 RX ORDER — PIOGLITAZONEHYDROCHLORIDE 15 MG/1
15 TABLET ORAL DAILY
Qty: 30 TABLET | Refills: 11 | Status: SHIPPED | OUTPATIENT
Start: 2022-11-10 | End: 2023-11-10

## 2022-11-10 RX ORDER — TIRZEPATIDE 5 MG/.5ML
5 INJECTION, SOLUTION SUBCUTANEOUS WEEKLY
Qty: 2 ML | Refills: 5 | Status: SHIPPED | OUTPATIENT
Start: 2022-11-10 | End: 2023-02-20

## 2022-11-10 NOTE — PROGRESS NOTES
Endocrine Progress Note Outpatient     Patient Care Team:  Kavitha Teran MD as PCP - General  Prashanth Parikh MD as Consulting Physician (Cardiology)  Sita Yusuf MD as Consulting Physician (Hematology and Oncology)  Miki Man MD as Consulting Physician (Endocrinology)  Aric Bauer MD as Surgeon (General Surgery)    Chief Complaint: Follow-up type 2 diabetes    HPI: 61-year-old male with history of type 2 diabetes, hypertension, hyperlipidemia and obesity is here for follow-up.    For type 2 diabetes he is currently on metformin 1000 mg twice a day, Jardiance 25 mill grams once a day, glyburide 10 mill grams twice a day, Ozempic 1.0 mg subcu once a week.  He did not bring blood sugar records for review but tells me they are running between 1 50-1 80 most of the time.  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  He would rather not go on insulin as he will lose his license.    Hypertension: Uncontrolled.     Hyperlipidemia: He is currently on atorvastatin and fenofibrate.    Obesity: He is trying to work on diet.     Impotence: Testosterone level normal.     Past Medical History:   Diagnosis Date   • B12 deficiency 2015   • Broken finger     broken middle finger   • Coronary artery disease    • Gout    • Heart attack (HCC)    • Heart murmur    • Hx of migraines    • Plantar fasciitis, left 2017       Social History     Socioeconomic History   • Marital status:      Spouse name: Yesi   • Number of children: 1   • Years of education: 12   Tobacco Use   • Smoking status: Former     Packs/day: 0.50     Years: 5.00     Pack years: 2.50     Types: Cigarettes     Start date: 1990     Quit date:      Years since quittin.8   • Smokeless tobacco: Never   Vaping Use   • Vaping Use: Never used   Substance and Sexual Activity   • Alcohol use: No   • Drug use: No   • Sexual activity: Yes      Partners: Female     Birth control/protection: None       Family History   Problem Relation Age of Onset   • No Known Problems Mother    • Diabetes Father    • Diabetes Paternal Grandmother    • Cancer Other        Allergies   Allergen Reactions   • Gabapentin Dizziness       ROS:   Constitutional:  Denies fatigue, tiredness.    Eyes:  Denies change in visual acuity   HENT:  Denies nasal congestion or sore throat   Respiratory: denies cough, shortness of breath.   Cardiovascular:  denies chest pain, edema   GI:  Denies abdominal pain, nausea, vomiting.   Musculoskeletal:  Denies back pain or joint pain   Integument:  Denies dry skin and rash   Neurologic:  Denies headache, focal weakness or sensory changes   Endocrine:  Denies polyuria or polydipsia   Psychiatric:  Denies depression or anxiety      Vitals:    11/10/22 0817   BP: 168/85   Pulse: 86   SpO2: 95%     BMI: 35.4  Physical Exam:  GEN: NAD, conversant, obese  EYES: EOMI, PERRL, no conjunctival erythema  NECK: no thyromegaly, full ROM   CV: RRR, no murmurs/rubs/gallops, no peripheral edema  LUNG: CTAB, no wheezes/rales/ronchi  SKIN: no rashes, no acanthosis  MSK: no deformities, full ROM of all extremities  NEURO: no tremors, DTR normal  PSYCH: AOX3, appropriate mood, affect normal      Results Review:     I reviewed the patient's new clinical results.    Lab Results   Component Value Date    HGBA1C 9.2 (H) 11/04/2022    HGBA1C 8.3 (H) 09/29/2022    HGBA1C 8.6 (H) 05/26/2022      Lab Results   Component Value Date    GLUCOSE 278 (H) 11/04/2022    BUN 16 11/04/2022    CREATININE 0.86 11/04/2022    EGFRIFNONA 93 05/07/2021    BCR 18.6 11/04/2022    K 4.1 11/04/2022    CO2 25.0 11/04/2022    CALCIUM 9.9 11/04/2022    ALBUMIN 4.10 11/04/2022    LABIL2 1.3 08/24/2020    AST 19 11/04/2022    ALT 27 11/04/2022    CHOL 194 11/04/2022    TRIG 546 (H) 11/04/2022    LDL 78 11/04/2022    HDL 29 (L) 11/04/2022     Lab Results   Component Value Date    TSH 2.710  09/29/2022         Medication Review: Reviewed.       Current Outpatient Medications:   •  aspirin 81 MG EC tablet, Take 81 mg by mouth 3 (Three) Times a Week., Disp: , Rfl:   •  cycloSPORINE (RESTASIS) 0.05 % ophthalmic emulsion, igtt OU BID, Disp: , Rfl:   •  dilTIAZem CD (Cardizem CD) 360 MG 24 hr capsule, Take 1 capsule by mouth Daily., Disp: 90 capsule, Rfl: 1  •  glyburide (DIAbeta) 5 MG tablet, Take 2 tablets by mouth twice daily, Disp: 360 tablet, Rfl: 0  •  hydroCHLOROthiazide (MICROZIDE) 12.5 MG capsule, Take 1 capsule by mouth Daily., Disp: 90 capsule, Rfl: 0  •  ibuprofen (ADVIL,MOTRIN) 600 MG tablet, TAKE 1 TABLET (600 MG) BY MOUTH ONCE DAILY FOR 30 DAYS, Disp: , Rfl:   •  ipratropium-albuterol (DUO-NEB) 0.5-2.5 mg/3 ml nebulizer, Take 3 mL by nebulization Every 4 (Four) Hours As Needed for Wheezing., Disp: 360 mL, Rfl: 3  •  Jardiance 25 MG tablet tablet, Take 1 tablet by mouth once daily, Disp: 90 tablet, Rfl: 0  •  LYRICA 100 MG capsule, Take 100 mg by mouth 2 (Two) Times a Day As Needed (patient states only takes as needed)., Disp: , Rfl: 0  •  metFORMIN ER (GLUCOPHAGE-XR) 500 MG 24 hr tablet, Take 2 tablets by mouth 2 (Two) Times a Day., Disp: 180 tablet, Rfl: 6  •  metoprolol tartrate (LOPRESSOR) 25 MG tablet, Take 25 mg by mouth 2 (Two) Times a Day., Disp: , Rfl:   •  oxyCODONE (ROXICODONE) 10 MG tablet, Take 10 mg by mouth Every 6 (Six) Hours As Needed., Disp: , Rfl:   •  pravastatin (PRAVACHOL) 40 MG tablet, Take 1 tablet by mouth once daily, Disp: 30 tablet, Rfl: 6  •  Semaglutide, 2 MG/DOSE, (Ozempic, 2 MG/DOSE,) 8 MG/3ML solution pen-injector, Inject 2 mg under the skin into the appropriate area as directed Every 7 (Seven) Days., Disp: 3 mL, Rfl: 0      Assessment & Plan   1.  Diabetes mellitus type 2 with hyperglycemia: Uncontrolled with worsening of A1c at 9.2%.  At this time I will DC Ozempic, add on Mounjaro 5 mg subcu once a week.  I will continue metformin along with Jardiance and  glyburide.  He has been taking metformin only 500 mg twice a day but he is going to start taking 1000 mg twice a day.  I will also add Actos 15 mg p.o. daily.  Side effects of increased weight and leg swelling discussed with him and he is advised to watch for those.  Advised to work on his diet and avoid processed food.  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  He is advised to continue to work on diet and activity and always keep glucose source in case of low blood sugars.    2.  Hypertension: Uncontrolled, he tells me that he supposed to be on losartan but has been out for last few weeks.  I will resume losartan at 50 mg p.o. daily and follow blood pressure.    3.  Hyperlipidemia: On atorvastatin and fenofibrate, follow lipid panel.    4.  Obesity: Advised to continue to work on diet and activity.    5.  Impotence: Testosterone level was normal.  He tried Cialis without help.      Assessment and plan from April 14, 2022 reviewed and updated.          Miki Man MD FACE.

## 2022-11-10 NOTE — PATIENT INSTRUCTIONS
DC Ozempic  Start Mounjaro 5 mg subcu weekly  Start Actos 15 mg p.o. daily at bedtime  Start losartan 50 mg p.o. daily  Please increase your metformin to 1000 mg twice a day  Continue to work on your diet and activity  Always keep glucose source in case of low blood sugar  Annual eye exam.

## 2022-11-10 NOTE — TELEPHONE ENCOUNTER
PA on Mounjaro initiated in [facility]. Key: [id number] - PA Case ID: [id number] Approved today  PA Case: [id number], Status: Approved, Coverage Starts on: 11/10/2022 12:00:00 AM, Coverage Ends on: 11/10/2023 12:00:00 AM.

## 2022-12-12 RX ORDER — EMPAGLIFLOZIN 25 MG/1
TABLET, FILM COATED ORAL
Qty: 90 TABLET | Refills: 2 | Status: SHIPPED | OUTPATIENT
Start: 2022-12-12

## 2022-12-26 RX ORDER — DILTIAZEM HYDROCHLORIDE 300 MG/1
CAPSULE, EXTENDED RELEASE ORAL
Qty: 90 CAPSULE | Refills: 0 | OUTPATIENT
Start: 2022-12-26

## 2022-12-27 ENCOUNTER — TELEPHONE (OUTPATIENT)
Dept: FAMILY MEDICINE CLINIC | Facility: CLINIC | Age: 62
End: 2022-12-27

## 2022-12-27 NOTE — TELEPHONE ENCOUNTER
Caller: Dawson Lomax    Relationship: Self    Best call back number: [phone]    Requested Prescriptions:   Requested Prescriptions     Pending Prescriptions Disp Refills   • dilTIAZem CD (Cardizem CD) 360 MG 24 hr capsule 90 capsule 1     Sig: Take 1 capsule by mouth Daily.        Pharmacy where request should be sent: [facility] [address] [place] [address] [id number] [phone] [address] [phone] FX     Additional details provided by patient: PATIENT HAS BEEN OUT     Does the patient have less than a 3 day supply:  [x] Yes  [] No    Would you like a call back once the refill request has been completed: [x] Yes [] No    If the office needs to give you a call back, can they leave a voicemail: [x] Yes [] No    Yoandy Dugan Rep   12/27/22 10:37 EST

## 2022-12-27 NOTE — TELEPHONE ENCOUNTER
Caller: Dawson Lomax    Relationship to patient: Self    Best call back number:      Patient is needing:  PATIENT STATES HE IS SCHEDULED THIS MORNING FOR PRESCREENING LABS FOR SURGERY, AND FOR HERNIA SURGERY ON January 3RD.  PATIENT STATES HE NEEDS THE OFFICE TO CALL HIM TO PROVIDE THE PHONE NUMBERS OF THE LAB AND SURGEON SO THAT HE CAN RESCHEDULE BOTH THESE APPOINTMENTS.

## 2022-12-28 NOTE — TELEPHONE ENCOUNTER
Rx Refill Note  Requested Prescriptions     Pending Prescriptions Disp Refills   • dilTIAZem CD (Cardizem CD) 360 MG 24 hr capsule 90 capsule 1     Sig: Take 1 capsule by mouth Daily.      Last office visit with prescribing clinician: 9/20/2022      Next office visit with prescribing clinician: 12/30/2022   3}  Erma Herrera  12/28/22, 10:32 EST

## 2022-12-29 RX ORDER — DILTIAZEM HYDROCHLORIDE 360 MG/1
360 CAPSULE, EXTENDED RELEASE ORAL DAILY
Qty: 90 CAPSULE | Refills: 1 | Status: SHIPPED | OUTPATIENT
Start: 2022-12-29

## 2023-01-16 ENCOUNTER — OFFICE VISIT (OUTPATIENT)
Dept: FAMILY MEDICINE CLINIC | Facility: CLINIC | Age: 63
End: 2023-01-16
Payer: MEDICAID

## 2023-01-16 VITALS
TEMPERATURE: 97.3 F | WEIGHT: 214.8 LBS | BODY MASS INDEX: 34.52 KG/M2 | HEART RATE: 83 BPM | HEIGHT: 66 IN | OXYGEN SATURATION: 94 % | SYSTOLIC BLOOD PRESSURE: 153 MMHG | DIASTOLIC BLOOD PRESSURE: 85 MMHG

## 2023-01-16 DIAGNOSIS — I25.10 CORONARY ARTERY DISEASE INVOLVING NATIVE CORONARY ARTERY OF NATIVE HEART WITHOUT ANGINA PECTORIS: ICD-10-CM

## 2023-01-16 DIAGNOSIS — R79.81 LOW OXYGEN SATURATION: ICD-10-CM

## 2023-01-16 DIAGNOSIS — E66.09 CLASS 1 OBESITY DUE TO EXCESS CALORIES WITH SERIOUS COMORBIDITY AND BODY MASS INDEX (BMI) OF 34.0 TO 34.9 IN ADULT: ICD-10-CM

## 2023-01-16 DIAGNOSIS — E11.65 TYPE 2 DIABETES MELLITUS WITH HYPERGLYCEMIA, WITHOUT LONG-TERM CURRENT USE OF INSULIN: ICD-10-CM

## 2023-01-16 DIAGNOSIS — Z12.11 SCREENING FOR COLON CANCER: Primary | ICD-10-CM

## 2023-01-16 DIAGNOSIS — I10 ESSENTIAL HYPERTENSION: ICD-10-CM

## 2023-01-16 PROCEDURE — 99214 OFFICE O/P EST MOD 30 MIN: CPT | Performed by: PREVENTIVE MEDICINE

## 2023-01-16 RX ORDER — AMOXICILLIN AND CLAVULANATE POTASSIUM 875; 125 MG/1; MG/1
1 TABLET, FILM COATED ORAL 2 TIMES DAILY
Qty: 20 TABLET | Refills: 0 | Status: SHIPPED | OUTPATIENT
Start: 2023-01-16 | End: 2023-02-20

## 2023-01-16 NOTE — PATIENT INSTRUCTIONS
Health Maintenance Due   Topic Date Due    TDAP/TD VACCINES (1 - Tdap) Never done    ZOSTER VACCINE (1 of 2) Never done    Pneumococcal Vaccine 0-64 (3 - PPSV23 if available, else PCV20) 01/01/2018    COVID-19 Vaccine (2 - Moderna series) 01/29/2021    COLORECTAL CANCER SCREENING  03/02/2022    INFLUENZA VACCINE  08/01/2022    Drink plenty of fluids and take antibiotic till last pill is pone.  Have wife check her O2 sat and see if his oximeter is accurate.  If reading below 90 needs to go to ER

## 2023-01-16 NOTE — PROGRESS NOTES
"Subjective   Dawson Lomax is a 62 y.o. male presents for   Chief Complaint   Patient presents with   • URI     Has had low O2 levels (88-94)   Patient presents today to discuss low oxygen saturations when he would exert himself after he had what he claims was pneumonia.  Chest x-ray did not show any pneumonia from urgent care patient did not finish his amoxicillin till it was gone and he he does have asplenia.  We have advised that when he gets started on antibiotic he should finish it till gone we did place him on Augmentin 875 twice daily take it till its gone.  He was encouraged to have his wife check his oximeter and call back if his oxygen level is below 90.  We have advised that he go to the emergency room if his oxygen is below 90 we have ordered a CT of his chest to make sure that there is no pulmonary embolism, is causing this blood pressure is not under good control he will home monitor and send us the results.  Patient has not had any fever and is still having some mild productive cough.    Health Maintenance Due   Topic Date Due   • TDAP/TD VACCINES (1 - Tdap) Never done   • ZOSTER VACCINE (1 of 2) Never done   • Pneumococcal Vaccine 0-64 (3 - PPSV23 if available, else PCV20) 01/01/2018   • COVID-19 Vaccine (2 - Moderna series) 01/29/2021   • COLORECTAL CANCER SCREENING  03/02/2022   • INFLUENZA VACCINE  08/01/2022       History of Present Illness     Vitals:    01/16/23 1022 01/16/23 1026 01/16/23 1028   BP: 160/90 135/87 153/85   BP Location: Right arm Left arm Right arm   Patient Position: Sitting Sitting Standing   Cuff Size: Large Adult Adult Adult   Pulse: 83     Temp: 97.3 °F (36.3 °C)     TempSrc: Tympanic     SpO2: 94%     Weight: 97.4 kg (214 lb 12.8 oz)     Height: 167.6 cm (65.98\")       Body mass index is 34.69 kg/m².    Current Outpatient Medications on File Prior to Visit   Medication Sig Dispense Refill   • aspirin 81 MG EC tablet Take 81 mg by mouth 3 (Three) Times a Week. LD 12/28   "   • cycloSPORINE (RESTASIS) 0.05 % ophthalmic emulsion igtt OU BID     • dilTIAZem CD (Cardizem CD) 360 MG 24 hr capsule Take 1 capsule by mouth Daily. 90 capsule 1   • glyburide (DIAbeta) 5 MG tablet Take 2 tablets by mouth twice daily (Patient taking differently: Take 10 mg by mouth Daily With Breakfast. HOLD DOS) 360 tablet 0   • hydroCHLOROthiazide (MICROZIDE) 12.5 MG capsule Take 1 capsule by mouth Daily. (Patient taking differently: Take 12.5 mg by mouth Daily. HOLD DOS) 90 capsule 0   • ibuprofen (ADVIL,MOTRIN) 600 MG tablet TAKE 1 TABLET (600 MG) BY MOUTH ONCE DAILY FOR 30 DAYS     • ipratropium-albuterol (DUO-NEB) 0.5-2.5 mg/3 ml nebulizer Take 3 mL by nebulization Every 4 (Four) Hours As Needed for Wheezing. 360 mL 3   • Jardiance 25 MG tablet tablet Take 1 tablet by mouth once daily (Patient taking differently: Take 25 mg by mouth Daily. HOLD DOS) 90 tablet 2   • losartan (Cozaar) 50 MG tablet Take 1 tablet by mouth Daily. (Patient taking differently: Take 50 mg by mouth Daily. HOLD 24 hours before surgery) 30 tablet 11   • LYRICA 100 MG capsule Take 100 mg by mouth 2 (Two) Times a Day As Needed (patient states only takes as needed).  0   • metFORMIN ER (GLUCOPHAGE-XR) 500 MG 24 hr tablet Take 2 tablets by mouth 2 (Two) Times a Day. (Patient taking differently: Take 1,000 mg by mouth 2 (Two) Times a Day. HOLD 48 hours before surgery) 180 tablet 6   • metoprolol tartrate (LOPRESSOR) 25 MG tablet Take 25 mg by mouth 2 (Two) Times a Day.     • oxyCODONE (ROXICODONE) 10 MG tablet Take 10 mg by mouth Every 6 (Six) Hours As Needed.     • pioglitazone (Actos) 15 MG tablet Take 1 tablet by mouth Daily. 30 tablet 11   • pravastatin (PRAVACHOL) 40 MG tablet Take 1 tablet by mouth once daily 30 tablet 6   • Semaglutide, 1 MG/DOSE, (Ozempic, 1 MG/DOSE,) 2 MG/1.5ML solution pen-injector Inject  under the skin into the appropriate area as directed 1 (One) Time Per Week.     • Tirzepatide (Mounjaro) 5 MG/0.5ML solution  pen-injector Inject 0.5 mL under the skin into the appropriate area as directed 1 (One) Time Per Week. 2 mL 5     No current facility-administered medications on file prior to visit.       The following portions of the patient's history were reviewed and updated as appropriate: allergies, current medications, past family history, past medical history, past social history, past surgical history and problem list.    Review of Systems   Constitutional: Negative for fatigue and fever.   Respiratory: Positive for cough and shortness of breath.        Objective   Physical Exam  Vitals reviewed.   Constitutional:       General: He is not in acute distress.     Appearance: He is well-developed. He is obese. He is not ill-appearing or toxic-appearing.   HENT:      Head: Normocephalic and atraumatic.      Right Ear: Tympanic membrane, ear canal and external ear normal.      Left Ear: Tympanic membrane, ear canal and external ear normal.      Nose: Nose normal.      Mouth/Throat:      Mouth: Mucous membranes are moist.      Pharynx: No posterior oropharyngeal erythema.   Eyes:      Extraocular Movements: Extraocular movements intact.      Conjunctiva/sclera: Conjunctivae normal.      Pupils: Pupils are equal, round, and reactive to light.   Cardiovascular:      Rate and Rhythm: Normal rate and regular rhythm.      Pulses:           Dorsalis pedis pulses are 1+ on the right side and 1+ on the left side.        Posterior tibial pulses are 1+ on the right side and 1+ on the left side.      Heart sounds: Normal heart sounds.   Pulmonary:      Effort: Pulmonary effort is normal.      Comments: Decreased breath sounds bilaterally  Abdominal:      General: Bowel sounds are normal. There is no distension.      Palpations: Abdomen is soft. There is no mass.      Tenderness: There is no abdominal tenderness.   Musculoskeletal:         General: Normal range of motion.      Cervical back: Neck supple.      Comments: Homans test was  negative   Feet:      Right foot:      Protective Sensation: 10 sites tested. 6 sites sensed.      Skin integrity: Dry skin present.      Left foot:      Protective Sensation: 10 sites tested. 5 sites sensed.      Skin integrity: Dry skin present.      Toenail Condition: Left toenails are abnormally thick.      Comments: Diabetic Foot Exam Performed and Monofilament Test Performed    Skin:     General: Skin is warm.   Neurological:      General: No focal deficit present.      Mental Status: He is alert and oriented to person, place, and time.   Psychiatric:         Mood and Affect: Mood normal.         Behavior: Behavior normal.       PHQ-9 Total Score: 0    Assessment & Plan   Diagnoses and all orders for this visit:    1. Screening for colon cancer (Primary)  Comments:  Patient was reminded to do his Cologuard.  Orders:  -     CT Chest Without Contrast Diagnostic; Future    2. Type 2 diabetes mellitus with hyperglycemia, without long-term current use of insulin (HCC)  Comments:  Patient states his blood sugars have been doing well and he will get eye exam    3. Coronary artery disease involving native coronary artery of native heart without angina pectoris  Comments:  No chest pain but has had some shortness of breath and is due to follow-up with his cardiologist.    4. Essential hypertension  Comments:  Blood pressure is not under good control so he will home monitor and send us the results.    5. Low oxygen saturation    6. Class 1 obesity due to excess calories with serious comorbidity and body mass index (BMI) of 34.0 to 34.9 in adult    Other orders  -     amoxicillin-clavulanate (AUGMENTIN) 875-125 MG per tablet; Take 1 tablet by mouth 2 (Two) Times a Day.  Dispense: 20 tablet; Refill: 0        Patient Instructions     Health Maintenance Due   Topic Date Due   • TDAP/TD VACCINES (1 - Tdap) Never done   • ZOSTER VACCINE (1 of 2) Never done   • Pneumococcal Vaccine 0-64 (3 - PPSV23 if available, else PCV20)  01/01/2018   • COVID-19 Vaccine (2 - Moderna series) 01/29/2021   • COLORECTAL CANCER SCREENING  03/02/2022   • INFLUENZA VACCINE  08/01/2022    Drink plenty of fluids and take antibiotic till last pill is pone.  Have wife check her O2 sat and see if his oximeter is accurate.  If reading below 90 needs to go to ER

## 2023-01-18 ENCOUNTER — PRIOR AUTHORIZATION (OUTPATIENT)
Dept: FAMILY MEDICINE CLINIC | Facility: CLINIC | Age: 63
End: 2023-01-18
Payer: MEDICAID

## 2023-01-23 NOTE — TELEPHONE ENCOUNTER
Approved Auth Number [id number] valid 1/18/23 to 3/18/23 order and approval faxed to [employer]- patient notified.

## 2023-02-07 ENCOUNTER — LAB (OUTPATIENT)
Dept: LAB | Facility: HOSPITAL | Age: 63
End: 2023-02-07
Payer: MEDICAID

## 2023-02-07 LAB
ANION GAP SERPL CALCULATED.3IONS-SCNC: 11 MMOL/L (ref 5–15)
BUN SERPL-MCNC: 15 MG/DL (ref 8–23)
BUN/CREAT SERPL: 19.5 (ref 7–25)
CALCIUM SPEC-SCNC: 9.3 MG/DL (ref 8.6–10.5)
CHLORIDE SERPL-SCNC: 101 MMOL/L (ref 98–107)
CO2 SERPL-SCNC: 24 MMOL/L (ref 22–29)
CREAT SERPL-MCNC: 0.77 MG/DL (ref 0.76–1.27)
DEPRECATED RDW RBC AUTO: 42.9 FL (ref 37–54)
EGFRCR SERPLBLD CKD-EPI 2021: 101.2 ML/MIN/1.73
ERYTHROCYTE [DISTWIDTH] IN BLOOD BY AUTOMATED COUNT: 12.5 % (ref 12.3–15.4)
GLUCOSE SERPL-MCNC: 175 MG/DL (ref 65–99)
HCT VFR BLD AUTO: 47.3 % (ref 37.5–51)
HGB BLD-MCNC: 16.1 G/DL (ref 13–17.7)
MCH RBC QN AUTO: 32.1 PG (ref 26.6–33)
MCHC RBC AUTO-ENTMCNC: 34 G/DL (ref 31.5–35.7)
MCV RBC AUTO: 94.4 FL (ref 79–97)
PLATELET # BLD AUTO: 379 10*3/MM3 (ref 140–450)
PMV BLD AUTO: 9.7 FL (ref 6–12)
POTASSIUM SERPL-SCNC: 3.8 MMOL/L (ref 3.5–5.2)
RBC # BLD AUTO: 5.01 10*6/MM3 (ref 4.14–5.8)
SODIUM SERPL-SCNC: 136 MMOL/L (ref 136–145)
WBC NRBC COR # BLD: 10.65 10*3/MM3 (ref 3.4–10.8)

## 2023-02-07 PROCEDURE — 80048 BASIC METABOLIC PNL TOTAL CA: CPT

## 2023-02-07 PROCEDURE — 85027 COMPLETE CBC AUTOMATED: CPT

## 2023-02-08 ENCOUNTER — TELEPHONE (OUTPATIENT)
Dept: CARDIOLOGY | Facility: CLINIC | Age: 63
End: 2023-02-08
Payer: MEDICAID

## 2023-02-08 NOTE — TELEPHONE ENCOUNTER
Caller: Dawson Lomax    Relationship to patient: Self    Best call back number: [phone]    Patient is needing: PT SCHEDULED 9 MONTH FOLLOW UP ON 3.22.23 PT REQUESTING TO COME IN BEFORE 2.21.23 BECAUSE HE NEEDS CLEARANCE BEFORE A PROCEDURE COMING UP.

## 2023-02-16 ENCOUNTER — TELEPHONE (OUTPATIENT)
Dept: FAMILY MEDICINE CLINIC | Facility: CLINIC | Age: 63
End: 2023-02-16
Payer: MEDICAID

## 2023-02-16 ENCOUNTER — HOSPITAL ENCOUNTER (OUTPATIENT)
Dept: CARDIOLOGY | Facility: HOSPITAL | Age: 63
Discharge: HOME | End: 2023-02-16
Admitting: INTERNAL MEDICINE
Payer: MEDICAID

## 2023-02-16 DIAGNOSIS — Z01.818 PRE-OP EVALUATION: ICD-10-CM

## 2023-02-16 DIAGNOSIS — Z01.818 PRE-OP EVALUATION: Primary | ICD-10-CM

## 2023-02-16 PROCEDURE — 93005 ELECTROCARDIOGRAM TRACING: CPT | Performed by: INTERNAL MEDICINE

## 2023-02-16 PROCEDURE — 93010 ELECTROCARDIOGRAM REPORT: CPT | Performed by: INTERNAL MEDICINE

## 2023-02-16 NOTE — TELEPHONE ENCOUNTER
Unless patient is on insulin insurance will not cover the Dexcom or the freestyle petra he may want to find out how much that costs and then we will be glad to prescribe if he wants to pay for that himself.  Let us know

## 2023-02-16 NOTE — TELEPHONE ENCOUNTER
Caller: Dawson Lomax    Relationship: Self    Best call back number: [phone]    What medication are you requesting: A NEW GLUCOSE MONITOR THAT HE CAN PLACE ON ARM AND GET READING WITH PHONE. DAWSON DOES NOT KNOW THE NAME.     PATIENT DOES NOT WANT TO USE THE PRICK MONITORS BECAUSE THEY ARE A LOT OF WORK    If a prescription is needed, what is your preferred pharmacy and phone number: [id number] - [place], [place] - [address] [facility] - [phone] [facility] [phone] FX     Additional notes:

## 2023-02-17 ENCOUNTER — TELEPHONE (OUTPATIENT)
Dept: CARDIOLOGY | Facility: CLINIC | Age: 63
End: 2023-02-17
Payer: MEDICAID

## 2023-02-17 NOTE — TELEPHONE ENCOUNTER
Caller: Dawson Lomax    Relationship to patient: Self    Best call back number: [phone]    Type of visit: FOLLOW UP    Requested date: 2.20.23    Additional notes: PATIENT HAD EKG DONE 2.16.23 FOR HIS HERNIA SURGERY ON 2.21.23. HE WAS ADVISED IT CAME BACK ABNORMAL, AND THEY WILL NOT DO THE SURGERY WITHOUT A CLEARANCE FROM DR HSU         July 12, 2022     Patient: Antonietta Dumont   YOB: 1987   Date of Visit: 7/12/2022       To Whom it May Concern:    Antonietta Dumont was seen in my clinic on 7/12/2022 at 2:20 pm.    Please excuse Antonietta for her absence from 7/7/22 to 7/12/22 due to incapacitation.  She can return to work on 7/13/22.    Sincerely,         Venecia Vaughn CNP    Medical information is confidential and cannot be disclosed without the written consent of the patient or her representative.

## 2023-02-20 ENCOUNTER — OFFICE VISIT (OUTPATIENT)
Dept: CARDIOLOGY | Facility: CLINIC | Age: 63
End: 2023-02-20
Payer: MEDICAID

## 2023-02-20 VITALS
SYSTOLIC BLOOD PRESSURE: 139 MMHG | DIASTOLIC BLOOD PRESSURE: 72 MMHG | BODY MASS INDEX: 32.95 KG/M2 | HEART RATE: 73 BPM | HEIGHT: 66 IN | OXYGEN SATURATION: 95 % | WEIGHT: 205 LBS

## 2023-02-20 DIAGNOSIS — I34.0 MITRAL VALVE INSUFFICIENCY, UNSPECIFIED ETIOLOGY: ICD-10-CM

## 2023-02-20 DIAGNOSIS — R07.9 CHEST PAIN, UNSPECIFIED TYPE: ICD-10-CM

## 2023-02-20 DIAGNOSIS — R06.09 DOE (DYSPNEA ON EXERTION): ICD-10-CM

## 2023-02-20 DIAGNOSIS — Z01.810 PREOP CARDIOVASCULAR EXAM: Primary | ICD-10-CM

## 2023-02-20 PROCEDURE — 99214 OFFICE O/P EST MOD 30 MIN: CPT | Performed by: NURSE PRACTITIONER

## 2023-02-20 PROCEDURE — 93000 ELECTROCARDIOGRAM COMPLETE: CPT | Performed by: NURSE PRACTITIONER
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
## 2023-02-25 LAB — QT INTERVAL: 390 MS

## 2023-03-14 RX ORDER — HYDROCHLOROTHIAZIDE 12.5 MG/1
CAPSULE, GELATIN COATED ORAL
Qty: 90 CAPSULE | Refills: 0 | Status: SHIPPED | OUTPATIENT
Start: 2023-03-14

## 2023-03-17 ENCOUNTER — HOSPITAL ENCOUNTER (OUTPATIENT)
Dept: NUCLEAR MEDICINE | Facility: HOSPITAL | Age: 63
Discharge: HOME | End: 2023-03-17
Payer: MEDICAID

## 2023-03-17 ENCOUNTER — HOSPITAL ENCOUNTER (OUTPATIENT)
Dept: CARDIOLOGY | Facility: HOSPITAL | Age: 63
Discharge: HOME | End: 2023-03-17
Admitting: NURSE PRACTITIONER
Payer: MEDICAID

## 2023-03-17 VITALS
BODY MASS INDEX: 32.95 KG/M2 | HEART RATE: 70 BPM | WEIGHT: 205 LBS | DIASTOLIC BLOOD PRESSURE: 68 MMHG | SYSTOLIC BLOOD PRESSURE: 140 MMHG | HEIGHT: 66 IN

## 2023-03-17 DIAGNOSIS — R06.09 DOE (DYSPNEA ON EXERTION): ICD-10-CM

## 2023-03-17 DIAGNOSIS — I34.0 MITRAL VALVE INSUFFICIENCY, UNSPECIFIED ETIOLOGY: ICD-10-CM

## 2023-03-17 DIAGNOSIS — Z01.810 PREOP CARDIOVASCULAR EXAM: ICD-10-CM

## 2023-03-17 DIAGNOSIS — R07.9 CHEST PAIN, UNSPECIFIED TYPE: ICD-10-CM

## 2023-03-17 PROCEDURE — A9502 TC99M TETROFOSMIN: HCPCS | Performed by: NURSE PRACTITIONER

## 2023-03-17 PROCEDURE — 78452 HT MUSCLE IMAGE SPECT MULT: CPT

## 2023-03-17 PROCEDURE — 78452 HT MUSCLE IMAGE SPECT MULT: CPT | Performed by: INTERNAL MEDICINE

## 2023-03-17 PROCEDURE — 93017 CV STRESS TEST TRACING ONLY: CPT

## 2023-03-17 PROCEDURE — 93016 CV STRESS TEST SUPVJ ONLY: CPT | Performed by: INTERNAL MEDICINE

## 2023-03-17 PROCEDURE — 0 TECHNETIUM TETROFOSMIN KIT: Performed by: NURSE PRACTITIONER

## 2023-03-17 PROCEDURE — 93306 TTE W/DOPPLER COMPLETE: CPT

## 2023-03-17 PROCEDURE — 93306 TTE W/DOPPLER COMPLETE: CPT | Performed by: INTERNAL MEDICINE

## 2023-03-17 PROCEDURE — 93018 CV STRESS TEST I&R ONLY: CPT | Performed by: INTERNAL MEDICINE

## 2023-03-17 RX ADMIN — TETROFOSMIN 1 DOSE: 1.38 INJECTION, POWDER, LYOPHILIZED, FOR SOLUTION INTRAVENOUS at 08:15

## 2023-03-17 RX ADMIN — TETROFOSMIN 1 DOSE: 1.38 INJECTION, POWDER, LYOPHILIZED, FOR SOLUTION INTRAVENOUS at 09:30

## 2023-03-18 LAB
BH CV REST NUCLEAR ISOTOPE DOSE: 11 MCI
BH CV STRESS BP STAGE 1: NORMAL
BH CV STRESS BP STAGE 2: NORMAL
BH CV STRESS BP STAGE 3: NORMAL
BH CV STRESS DURATION MIN STAGE 1: 3
BH CV STRESS DURATION MIN STAGE 2: 3
BH CV STRESS DURATION MIN STAGE 3: 3
BH CV STRESS DURATION SEC STAGE 1: 0
BH CV STRESS DURATION SEC STAGE 2: 0
BH CV STRESS DURATION SEC STAGE 3: 0
BH CV STRESS GRADE STAGE 1: 10
BH CV STRESS GRADE STAGE 2: 12
BH CV STRESS GRADE STAGE 3: 14
BH CV STRESS HR STAGE 1: 86
BH CV STRESS HR STAGE 2: 123
BH CV STRESS HR STAGE 3: 133
BH CV STRESS METS STAGE 1: 5
BH CV STRESS METS STAGE 2: 7.5
BH CV STRESS METS STAGE 3: 10
BH CV STRESS NUCLEAR ISOTOPE DOSE: 29 MCI
BH CV STRESS PROTOCOL 1: NORMAL
BH CV STRESS RECOVERY BP: NORMAL MMHG
BH CV STRESS RECOVERY HR: 94 BPM
BH CV STRESS SPEED STAGE 1: 1.7
BH CV STRESS SPEED STAGE 2: 2.5
BH CV STRESS SPEED STAGE 3: 3.4
BH CV STRESS STAGE 1: 1
BH CV STRESS STAGE 2: 2
BH CV STRESS STAGE 3: 3
LV EF NUC BP: 66 %
MAXIMAL PREDICTED HEART RATE: 158 BPM
PERCENT MAX PREDICTED HR: 84.18 %
STRESS BASELINE BP: NORMAL MMHG
STRESS BASELINE HR: 86 BPM
STRESS PERCENT HR: 99 %
STRESS POST ESTIMATED WORKLOAD: 7.4 METS
STRESS POST EXERCISE DUR MIN: 8 MIN
STRESS POST EXERCISE DUR SEC: 0 SEC
STRESS POST PEAK BP: NORMAL MMHG
STRESS POST PEAK HR: 133 BPM
STRESS TARGET HR: 134 BPM

## 2023-03-20 NOTE — PROGRESS NOTES
Call pt and let him know the stress test looked ok.  No ischemia  Waiting on Dr. Parikh to read echo, As soon as I get results will notifiy him and Dr. Bauer about surgical clearance

## 2023-03-26 LAB
BH CV ECHO MEAS - ACS: 2.5 CM
BH CV ECHO MEAS - AI P1/2T: 903.3 MSEC
BH CV ECHO MEAS - AO MAX PG: 8.4 MMHG
BH CV ECHO MEAS - AO MEAN PG: 5 MMHG
BH CV ECHO MEAS - AO ROOT DIAM: 3.4 CM
BH CV ECHO MEAS - AO V2 MAX: 145 CM/SEC
BH CV ECHO MEAS - AO V2 VTI: 32.1 CM
BH CV ECHO MEAS - AVA(I,D): 2.32 CM2
BH CV ECHO MEAS - EDV(CUBED): 91.1 ML
BH CV ECHO MEAS - EDV(MOD-SP2): 96.2 ML
BH CV ECHO MEAS - EDV(MOD-SP4): 114 ML
BH CV ECHO MEAS - EF(MOD-BP): 68.3 %
BH CV ECHO MEAS - EF(MOD-SP2): 68.3 %
BH CV ECHO MEAS - EF(MOD-SP4): 66.8 %
BH CV ECHO MEAS - ESV(CUBED): 39.3 ML
BH CV ECHO MEAS - ESV(MOD-SP2): 30.5 ML
BH CV ECHO MEAS - ESV(MOD-SP4): 37.9 ML
BH CV ECHO MEAS - FS: 24.4 %
BH CV ECHO MEAS - IVS/LVPW: 0.91 CM
BH CV ECHO MEAS - IVSD: 1 CM
BH CV ECHO MEAS - LA DIMENSION: 4.1 CM
BH CV ECHO MEAS - LAT PEAK E' VEL: 6.7 CM/SEC
BH CV ECHO MEAS - LV DIASTOLIC VOL/BSA (35-75): 56.4 CM2
BH CV ECHO MEAS - LV MASS(C)D: 164 GRAMS
BH CV ECHO MEAS - LV MAX PG: 6.3 MMHG
BH CV ECHO MEAS - LV MEAN PG: 2 MMHG
BH CV ECHO MEAS - LV SYSTOLIC VOL/BSA (12-30): 18.8 CM2
BH CV ECHO MEAS - LV V1 MAX: 125 CM/SEC
BH CV ECHO MEAS - LV V1 VTI: 22.6 CM
BH CV ECHO MEAS - LVIDD: 4.5 CM
BH CV ECHO MEAS - LVIDS: 3.4 CM
BH CV ECHO MEAS - LVOT AREA: 3.3 CM2
BH CV ECHO MEAS - LVOT DIAM: 2.05 CM
BH CV ECHO MEAS - LVPWD: 1.1 CM
BH CV ECHO MEAS - MED PEAK E' VEL: 5.6 CM/SEC
BH CV ECHO MEAS - MR MAX PG: 14.4 MMHG
BH CV ECHO MEAS - MR MAX VEL: 190 CM/SEC
BH CV ECHO MEAS - MV A DUR: 0.14 SEC
BH CV ECHO MEAS - MV A MAX VEL: 85.4 CM/SEC
BH CV ECHO MEAS - MV DEC TIME: 0.21 MSEC
BH CV ECHO MEAS - MV E MAX VEL: 84.4 CM/SEC
BH CV ECHO MEAS - MV E/A: 0.99
BH CV ECHO MEAS - PA V2 MAX: 147.2 CM/SEC
BH CV ECHO MEAS - PULM A REVS DUR: 0.12 SEC
BH CV ECHO MEAS - PULM A REVS VEL: 25.1 CM/SEC
BH CV ECHO MEAS - PULM DIAS VEL: 50.1 CM/SEC
BH CV ECHO MEAS - PULM S/D: 0.69
BH CV ECHO MEAS - PULM SYS VEL: 34.6 CM/SEC
BH CV ECHO MEAS - RAP SYSTOLE: 3 MMHG
BH CV ECHO MEAS - RV MAX PG: 3.1 MMHG
BH CV ECHO MEAS - RV V1 MAX: 87.8 CM/SEC
BH CV ECHO MEAS - RV V1 VTI: 15.7 CM
BH CV ECHO MEAS - RVSP: 26 MMHG
BH CV ECHO MEAS - SI(MOD-SP2): 32.5 ML/M2
BH CV ECHO MEAS - SI(MOD-SP4): 37.6 ML/M2
BH CV ECHO MEAS - SV(LVOT): 74.6 ML
BH CV ECHO MEAS - SV(MOD-SP2): 65.7 ML
BH CV ECHO MEAS - SV(MOD-SP4): 76.1 ML
BH CV ECHO MEAS - TR MAX PG: 23.2 MMHG
BH CV ECHO MEAS - TR MAX VEL: 241 CM/SEC
BH CV ECHO MEASUREMENTS AVERAGE E/E' RATIO: 13.72
BH CV XLRA - RV BASE: 3.9 CM
BH CV XLRA - RV LENGTH: 6.5 CM
BH CV XLRA - RV MID: 2.6 CM
BH CV XLRA - TDI S': 10.4 CM/SEC
LEFT ATRIUM VOLUME INDEX: 19.8 ML/M2
MAXIMAL PREDICTED HEART RATE: 158 BPM
SINUS: 3.4 CM
STJ: 3.1 CM
STRESS TARGET HR: 134 BPM

## 2023-03-27 NOTE — PROGRESS NOTES
Let pt know echo looks ok.  He is cleared for surgery. Please send clearance to Dr. Bauer.  Return to f/u if continued or recurrent symptoms

## 2023-05-10 RX ORDER — CIPROFLOXACIN HYDROCHLORIDE 3.5 MG/ML
SOLUTION/ DROPS TOPICAL
COMMUNITY
Start: 2023-03-16

## 2023-05-11 RX ORDER — GLYBURIDE 5 MG/1
TABLET ORAL
Qty: 360 TABLET | Refills: 0 | Status: SHIPPED | OUTPATIENT
Start: 2023-05-11

## 2023-05-16 ENCOUNTER — LAB (OUTPATIENT)
Dept: LAB | Facility: HOSPITAL | Age: 63
End: 2023-05-16
Payer: MEDICAID

## 2023-05-16 DIAGNOSIS — E11.65 TYPE 2 DIABETES MELLITUS WITH HYPERGLYCEMIA, WITHOUT LONG-TERM CURRENT USE OF INSULIN: ICD-10-CM

## 2023-05-16 DIAGNOSIS — E78.2 MIXED HYPERLIPIDEMIA: ICD-10-CM

## 2023-05-16 LAB
ALBUMIN SERPL-MCNC: 4.7 G/DL (ref 3.5–5.2)
ALBUMIN UR-MCNC: 5.1 MG/DL
ALBUMIN/GLOB SERPL: 1.4 G/DL
ALP SERPL-CCNC: 82 U/L (ref 39–117)
ALT SERPL W P-5'-P-CCNC: 27 U/L (ref 1–41)
ANION GAP SERPL CALCULATED.3IONS-SCNC: 12 MMOL/L (ref 5–15)
AST SERPL-CCNC: 25 U/L (ref 1–40)
BILIRUB SERPL-MCNC: 0.4 MG/DL (ref 0–1.2)
BUN SERPL-MCNC: 16 MG/DL (ref 8–23)
BUN/CREAT SERPL: 18.2 (ref 7–25)
CALCIUM SPEC-SCNC: 9.3 MG/DL (ref 8.6–10.5)
CHLORIDE SERPL-SCNC: 101 MMOL/L (ref 98–107)
CHOLEST SERPL-MCNC: 178 MG/DL (ref 0–200)
CO2 SERPL-SCNC: 24 MMOL/L (ref 22–29)
CREAT SERPL-MCNC: 0.88 MG/DL (ref 0.76–1.27)
CREAT UR-MCNC: 37.6 MG/DL
EGFRCR SERPLBLD CKD-EPI 2021: 97.2 ML/MIN/1.73
GLOBULIN UR ELPH-MCNC: 3.3 GM/DL
GLUCOSE SERPL-MCNC: 224 MG/DL (ref 65–99)
HBA1C MFR BLD: 10.1 % (ref 4.8–5.6)
HDLC SERPL-MCNC: 30 MG/DL (ref 40–60)
LDLC SERPL CALC-MCNC: 91 MG/DL (ref 0–100)
LDLC/HDLC SERPL: 2.67 {RATIO}
MICROALBUMIN/CREAT UR: 135.6 MG/G
POTASSIUM SERPL-SCNC: 4.1 MMOL/L (ref 3.5–5.2)
PROT SERPL-MCNC: 8 G/DL (ref 6–8.5)
SODIUM SERPL-SCNC: 137 MMOL/L (ref 136–145)
TRIGL SERPL-MCNC: 340 MG/DL (ref 0–150)
VLDLC SERPL-MCNC: 57 MG/DL (ref 5–40)

## 2023-05-16 PROCEDURE — 80061 LIPID PANEL: CPT

## 2023-05-16 PROCEDURE — 80053 COMPREHEN METABOLIC PANEL: CPT

## 2023-05-16 PROCEDURE — 83036 HEMOGLOBIN GLYCOSYLATED A1C: CPT

## 2023-05-16 PROCEDURE — 82043 UR ALBUMIN QUANTITATIVE: CPT

## 2023-05-16 PROCEDURE — 82570 ASSAY OF URINE CREATININE: CPT

## 2023-05-16 PROCEDURE — 36415 COLL VENOUS BLD VENIPUNCTURE: CPT

## 2023-05-18 ENCOUNTER — OFFICE VISIT (OUTPATIENT)
Dept: ENDOCRINOLOGY | Facility: CLINIC | Age: 63
End: 2023-05-18
Payer: MEDICAID

## 2023-05-18 VITALS
BODY MASS INDEX: 34.23 KG/M2 | WEIGHT: 213 LBS | OXYGEN SATURATION: 96 % | DIASTOLIC BLOOD PRESSURE: 80 MMHG | HEIGHT: 66 IN | HEART RATE: 78 BPM | SYSTOLIC BLOOD PRESSURE: 145 MMHG

## 2023-05-18 DIAGNOSIS — E11.65 TYPE 2 DIABETES MELLITUS WITH HYPERGLYCEMIA, WITHOUT LONG-TERM CURRENT USE OF INSULIN: Primary | ICD-10-CM

## 2023-05-18 DIAGNOSIS — E78.2 MIXED HYPERLIPIDEMIA: ICD-10-CM

## 2023-05-18 DIAGNOSIS — I10 ESSENTIAL HYPERTENSION: ICD-10-CM

## 2023-05-18 DIAGNOSIS — E66.09 CLASS 1 OBESITY DUE TO EXCESS CALORIES WITH SERIOUS COMORBIDITY AND BODY MASS INDEX (BMI) OF 34.0 TO 34.9 IN ADULT: ICD-10-CM

## 2023-05-18 LAB — GLUCOSE BLDC GLUCOMTR-MCNC: 321 MG/DL (ref 70–105)

## 2023-05-18 PROCEDURE — 82948 REAGENT STRIP/BLOOD GLUCOSE: CPT | Performed by: INTERNAL MEDICINE

## 2023-05-18 RX ORDER — TIRZEPATIDE 7.5 MG/.5ML
7.5 INJECTION, SOLUTION SUBCUTANEOUS WEEKLY
Qty: 6 ML | Refills: 4 | Status: SHIPPED | OUTPATIENT
Start: 2023-05-18
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
## 2023-05-18 NOTE — PATIENT INSTRUCTIONS
DC Ozempic  Start Mounjaro 7.5 mg subcu weekly  Continue to work on your diet and activity  Labs before follow-up

## 2023-06-09 RX ORDER — HYDROCHLOROTHIAZIDE 12.5 MG/1
CAPSULE, GELATIN COATED ORAL
Qty: 90 CAPSULE | Refills: 0 | Status: SHIPPED | OUTPATIENT
Start: 2023-06-09

## 2023-08-30 ENCOUNTER — TELEPHONE (OUTPATIENT)
Dept: FAMILY MEDICINE CLINIC | Facility: CLINIC | Age: 63
End: 2023-08-30
Payer: MEDICAID

## 2023-08-30 NOTE — TELEPHONE ENCOUNTER
Caller: Dawson Lomax    Relationship: Self    Best call back number: [phone]     What orders are you requesting (i.e. lab or imaging): IMAGING    In what timeframe would the patient need to come in: AS SOON AS POSSIBLE      Additional notes:   PATIENT CALLED REQUESTING A MRI OR CT FOR HIS LEFT HIP AND GROIN AREA. HE STATES IT HAS HURT FOR A COUPLE OF MONTHS AND GETTING WORSE.

## 2023-09-19 ENCOUNTER — TELEPHONE (OUTPATIENT)
Dept: FAMILY MEDICINE CLINIC | Facility: CLINIC | Age: 63
End: 2023-09-19
Payer: MEDICAID

## 2023-09-19 NOTE — TELEPHONE ENCOUNTER
Caller: Dawson Lomax    Relationship: Self    Best call back number: [phone]    What is the medical concern/diagnosis: LEFT HIP PROBLEMS     What specialty or service is being requested: ORTHOPEDIC SURGEON     What is the provider, practice or medical service name: [facility]     What is the office location:     What is the office phone number:     Any additional details: PATIENT DOES NOT WANT TO GO BACK TO [facility]     PATIENT WOULD LIKE TO STAY IN NETWORK WITH INSURANCE

## 2023-09-22 ENCOUNTER — TELEPHONE (OUTPATIENT)
Dept: FAMILY MEDICINE CLINIC | Facility: CLINIC | Age: 63
End: 2023-09-22
Payer: MEDICAID

## 2023-09-22 DIAGNOSIS — M25.559 HIP PAIN, UNSPECIFIED LATERALITY: Primary | ICD-10-CM

## 2023-09-22 NOTE — TELEPHONE ENCOUNTER
PT SPOUSE ASKING FOR REFERRAL FOR PT TO [facility] [place] - FOR HIS HIP

## 2023-09-27 ENCOUNTER — TELEPHONE (OUTPATIENT)
Dept: FAMILY MEDICINE CLINIC | Facility: CLINIC | Age: 63
End: 2023-09-27
Payer: MEDICAID

## 2023-09-27 NOTE — TELEPHONE ENCOUNTER
Unfortunately I have no way to speed up the referral to that particular doctor.  We can try to see if you can get into see Dr. Jones with our group sooner if you request.  Please let me know

## 2023-09-27 NOTE — TELEPHONE ENCOUNTER
Caller: Dawson Lomax    Relationship: Self    Best call back number: [phone]     What is the best time to reach you: ANY TIME    Who are you requesting to speak with (clinical staff, provider,  specific staff member): CLINICAL STAFF    What was the call regarding: PATIENT CALLED STATING HE IS HAVING A HARD TIME GETTING AN APPOINTMENT WITH DR. SUBRAMANIAN AT [facility]. THEY ARE GIVING HIM THE RUN AROUND AND HE IS IN A LOT OF PAIN. HE WANTS TO KNOW IF THERE IS ANYWHERE ELSE DR. BERMAN CAN REFER HIM TO THAT CAN GET HIM IN ANY QUICKER.     PLEASE ADVISE    Is it okay if the provider responds through MyChart: NO, PHONE CALL

## 2023-10-05 ENCOUNTER — OFFICE VISIT (OUTPATIENT)
Dept: ORTHOPEDIC SURGERY | Facility: CLINIC | Age: 63
End: 2023-10-05
Payer: MEDICAID

## 2023-10-05 VITALS
WEIGHT: 207 LBS | HEIGHT: 66 IN | BODY MASS INDEX: 33.27 KG/M2 | HEART RATE: 74 BPM | OXYGEN SATURATION: 98 % | RESPIRATION RATE: 18 BRPM

## 2023-10-05 DIAGNOSIS — M16.12 PRIMARY OSTEOARTHRITIS OF LEFT HIP: Primary | ICD-10-CM

## 2023-10-05 DIAGNOSIS — G89.29 CHRONIC LEFT HIP PAIN: ICD-10-CM

## 2023-10-05 DIAGNOSIS — M25.552 CHRONIC LEFT HIP PAIN: ICD-10-CM

## 2023-10-05 DIAGNOSIS — E66.9 OBESITY (BMI 30-39.9): ICD-10-CM

## 2023-10-05 DIAGNOSIS — E11.65 TYPE 2 DIABETES MELLITUS WITH HYPERGLYCEMIA, WITHOUT LONG-TERM CURRENT USE OF INSULIN: ICD-10-CM

## 2023-10-05 PROBLEM — E66.811 OBESITY (BMI 30.0-34.9): Status: ACTIVE | Noted: 2023-10-05
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
## 2023-11-01 RX ORDER — HYDROCHLOROTHIAZIDE 12.5 MG/1
12.5 CAPSULE, GELATIN COATED ORAL DAILY
Qty: 90 CAPSULE | Refills: 0 | Status: SHIPPED | OUTPATIENT
Start: 2023-11-01

## 2023-11-02 RX ORDER — GLYBURIDE 5 MG/1
TABLET ORAL
Qty: 360 TABLET | Refills: 4 | Status: SHIPPED | OUTPATIENT
Start: 2023-11-02

## 2023-11-02 RX ORDER — METFORMIN HYDROCHLORIDE 500 MG/1
1000 TABLET, EXTENDED RELEASE ORAL 2 TIMES DAILY
Qty: 180 TABLET | Refills: 4 | Status: SHIPPED | OUTPATIENT
Start: 2023-11-02

## 2023-11-16 RX ORDER — EMPAGLIFLOZIN 25 MG/1
TABLET, FILM COATED ORAL
Qty: 90 TABLET | Refills: 1 | Status: SHIPPED | OUTPATIENT
Start: 2023-11-16

## 2024-01-04 NOTE — PATIENT INSTRUCTIONS
Health Maintenance Due   Topic Date Due    TDAP/TD VACCINES (1 - Tdap) Never done    ZOSTER VACCINE (1 of 2) Never done    Pneumococcal Vaccine 0-64 (3 of 3 - PPSV23 or PCV20) 01/01/2018    COLORECTAL CANCER SCREENING  03/02/2022    BMI FOLLOWUP  03/09/2023    ANNUAL PHYSICAL  06/14/2023    PROSTATE CANCER SCREENING  09/29/2023    HEMOGLOBIN A1C  11/16/2023    Check blood pressure cuff for accuracy and send 10 blood pressures over 2 weeks.  Watch sodium, alcohol and weight     Consider gettting RSV and Prevnar 20 at pharmacy    12 hour fast for labs    Patient needs to take CPAP to [facility]    [id number] advised to use Flonase

## 2024-01-05 ENCOUNTER — OFFICE VISIT (OUTPATIENT)
Dept: FAMILY MEDICINE CLINIC | Facility: CLINIC | Age: 64
End: 2024-01-05
Payer: MEDICAID

## 2024-01-05 VITALS
HEIGHT: 66 IN | DIASTOLIC BLOOD PRESSURE: 88 MMHG | BODY MASS INDEX: 35.68 KG/M2 | SYSTOLIC BLOOD PRESSURE: 153 MMHG | OXYGEN SATURATION: 94 % | WEIGHT: 222 LBS | TEMPERATURE: 98.9 F | HEART RATE: 89 BPM

## 2024-01-05 DIAGNOSIS — K92.1 HEMATOCHEZIA: ICD-10-CM

## 2024-01-05 DIAGNOSIS — R04.2 HEMOPTYSIS: ICD-10-CM

## 2024-01-05 DIAGNOSIS — E78.2 MIXED HYPERLIPIDEMIA: ICD-10-CM

## 2024-01-05 DIAGNOSIS — J98.8 CONGESTION OF RESPIRATORY TRACT: ICD-10-CM

## 2024-01-05 DIAGNOSIS — Z12.5 SCREENING FOR PROSTATE CANCER: ICD-10-CM

## 2024-01-05 DIAGNOSIS — E53.8 B12 DEFICIENCY: ICD-10-CM

## 2024-01-05 DIAGNOSIS — E66.01 CLASS 2 SEVERE OBESITY WITH SERIOUS COMORBIDITY AND BODY MASS INDEX (BMI) OF 35.0 TO 35.9 IN ADULT, UNSPECIFIED OBESITY TYPE: ICD-10-CM

## 2024-01-05 DIAGNOSIS — R01.1 HEART MURMUR: ICD-10-CM

## 2024-01-05 DIAGNOSIS — E11.65 TYPE 2 DIABETES MELLITUS WITH HYPERGLYCEMIA, WITHOUT LONG-TERM CURRENT USE OF INSULIN: ICD-10-CM

## 2024-01-05 DIAGNOSIS — Z00.01 ENCOUNTER FOR ANNUAL GENERAL MEDICAL EXAMINATION WITH ABNORMAL FINDINGS IN ADULT: Primary | ICD-10-CM

## 2024-01-05 DIAGNOSIS — G47.33 OSA (OBSTRUCTIVE SLEEP APNEA): Chronic | ICD-10-CM

## 2024-01-05 DIAGNOSIS — R05.1 ACUTE COUGH: ICD-10-CM

## 2024-01-05 DIAGNOSIS — I25.10 CORONARY ARTERY DISEASE INVOLVING NATIVE CORONARY ARTERY OF NATIVE HEART WITHOUT ANGINA PECTORIS: ICD-10-CM

## 2024-01-05 DIAGNOSIS — Z12.11 SCREENING FOR COLON CANCER: ICD-10-CM

## 2024-01-05 DIAGNOSIS — I10 ESSENTIAL HYPERTENSION: ICD-10-CM

## 2024-01-05 DIAGNOSIS — I65.23 CAROTID STENOSIS, ASYMPTOMATIC, BILATERAL: ICD-10-CM

## 2024-01-05 DIAGNOSIS — E55.9 VITAMIN D DEFICIENCY: ICD-10-CM

## 2024-01-05 DIAGNOSIS — M10.9 GOUT, UNSPECIFIED CAUSE, UNSPECIFIED CHRONICITY, UNSPECIFIED SITE: Chronic | ICD-10-CM

## 2024-01-05 DIAGNOSIS — R09.89 CHEST CONGESTION: ICD-10-CM

## 2024-01-05 LAB
EXPIRATION DATE: NORMAL
FLUAV AG UPPER RESP QL IA.RAPID: NOT DETECTED
FLUBV AG UPPER RESP QL IA.RAPID: NOT DETECTED
INTERNAL CONTROL: NORMAL
Lab: NORMAL
SARS-COV-2 AG UPPER RESP QL IA.RAPID: NOT DETECTED

## 2024-01-05 RX ORDER — DILTIAZEM HYDROCHLORIDE 360 MG/1
360 CAPSULE, EXTENDED RELEASE ORAL DAILY
Qty: 90 CAPSULE | Refills: 1 | Status: SHIPPED | OUTPATIENT
Start: 2024-01-05

## 2024-01-05 RX ORDER — CETIRIZINE HYDROCHLORIDE 10 MG/1
10 TABLET ORAL DAILY
Qty: 90 TABLET | Refills: 3 | Status: SHIPPED | OUTPATIENT
Start: 2024-01-05

## 2024-01-05 NOTE — TELEPHONE ENCOUNTER
Rx Refill Note  Requested Prescriptions     Pending Prescriptions Disp Refills    dilTIAZem CD (Cardizem CD) 360 MG 24 hr capsule 90 capsule 1     Sig: Take 1 capsule by mouth Daily.      Last office visit with prescribing clinician: 1/5/2024   Last telemedicine visit with prescribing clinician: Visit date not found   Next office visit with prescribing clinician: Visit date not found                         Would you like a call back once the refill request has been completed: [] Yes [] No    If the office needs to give you a call back, can they leave a voicemail: [] Yes [] No    Samantha Villegas MA  01/05/24, 10:52 EST

## 2024-01-05 NOTE — PROGRESS NOTES
Subjective   Dawson Lomax is a 63 y.o. male presents for   Chief Complaint   Patient presents with    Sinusitis     Draining down the throat and making him cough       Health Maintenance Due   Topic Date Due    TDAP/TD VACCINES (1 - Tdap) Never done    ZOSTER VACCINE (1 of 2) Never done    Pneumococcal Vaccine 0-64 (3 of 3 - PPSV23 or PCV20) 01/01/2018    COLORECTAL CANCER SCREENING  03/02/2022    BMI FOLLOWUP  03/09/2023    ANNUAL PHYSICAL  06/14/2023    PROSTATE CANCER SCREENING  09/29/2023    HEMOGLOBIN A1C  11/16/2023     Patient is here for his annual wellness exam and has been advised to wear sunscreen and a seatbelt.    The patient consented to the use of FRANCIS.     The patient is a 63-year-old male who is here today for his annual wellness exam, screening for colon cancer screening, prostate cancer, acute cough, congestion of the respiratory tract, coronary artery disease, hypertension, gout, hyperlipidemia, B12 deficiency, type 2 diabetes with hyperglycemia without long-term use of insulin, vitamin D deficiency, carotid stenosis, heart murmur, MELISSA, hemoptysis, hematochezia, and class 2 severe obesity with serious comorbidities.    Healthcare maintenance.   Patient had his eye exam in 2023. He is due for his dental cleaning. Patient is due for his colon cancer screening.      Immunizations.   Patient received his influenza and COVID-19 vaccines at [facility] 1 to 2 months ago. He is due for his pneumonia and RSV vaccines.     Sinus congestion.   Patient has been dealing with sinus congestion and post nasal drainage since 10/2023 or 11/2023. He would wake up coughing and gagging because of the clear mucus production. He has seen the ENT specialist. Patient has not tried taking an allergy medication. He has not tried the Flonase nasal spray yet. Denies any fever.     Hypertension.   Blood pressure today is at 129/79 mmHg on the right arm and 153/88 mmHg on the left arm.     Obstructive sleep apnea.   He has not been  "using his CPAP machine.     Hematochezia.   Patient has noticed blood in his stool once or twice in the past 2 to 3 years. He attributes this to hemorrhoids. He has not had a colonoscopy. He only notices blood in his stool when he has diarrhea.    Bilateral carotid stenosis.   Patient is due for the ultrasound of his carotids.     Gout.   Patient has not had any flare-ups.     Vitamin B12 deficiency.   Patient is not taking and vitamin B12 supplements.     Vitamin D deficiency.   Patient is not taking any vitamin D supplements.     Heart murmur.   Patient follows with his cardiologist, Dr. Parikh and has an appointment with him on 02/14/2024.     Hernia.   Patient has a reducible hernia which needs to be taken cared of.     Family history.   Denies any family history of colon cancer.     Allergies.   Patient is allergic to gabapentin.      Vitals:    01/05/24 1045 01/05/24 1054   BP: 129/79 153/88   BP Location: Right arm Left arm   Patient Position: Sitting Sitting   Cuff Size: Large Adult Large Adult   Pulse: 89    Temp: 98.9 °F (37.2 °C)    TempSrc: Tympanic    SpO2: 94%    Weight: 101 kg (222 lb)    Height: 167.6 cm (65.98\")      Body mass index is 35.85 kg/m².    Current Outpatient Medications on File Prior to Visit   Medication Sig Dispense Refill    aspirin 81 MG EC tablet Take 1 tablet by mouth 3 (Three) Times a Week.      diclofenac (VOLTAREN) 50 MG EC tablet Take 1 tablet by mouth 3 (Three) Times a Day. 90 tablet 0    glyburide (DIAbeta) 5 MG tablet Take 2 tablets by mouth twice daily 360 tablet 4    hydroCHLOROthiazide (MICROZIDE) 12.5 MG capsule Take 1 capsule by mouth once daily 90 capsule 0    ipratropium-albuterol (DUO-NEB) 0.5-2.5 mg/3 ml nebulizer Take 3 mL by nebulization Every 4 (Four) Hours As Needed for Wheezing. 360 mL 3    Jardiance 25 MG tablet tablet Take 1 tablet by mouth once daily 90 tablet 1    losartan (COZAAR) 50 MG tablet Take 1 tablet by mouth Daily.      LYRICA 100 MG capsule Take 1 " capsule by mouth 2 (Two) Times a Day As Needed (patient states only takes as needed).  0    metFORMIN ER (GLUCOPHAGE-XR) 500 MG 24 hr tablet Take 2 tablets by mouth twice daily 180 tablet 4    oxyCODONE (ROXICODONE) 10 MG tablet take 1 tablet (10 mg) by oral route every 6 hours as needed for 30 days. M54.12  M51.36 M50.30 120 tablet 0    Tirzepatide (Mounjaro) 7.5 MG/0.5ML solution pen-injector Inject 0.5 mL under the skin into the appropriate area as directed 1 (One) Time Per Week. 6 mL 4    [DISCONTINUED] dilTIAZem CD (Cardizem CD) 360 MG 24 hr capsule Take 1 capsule by mouth Daily. 90 capsule 1    [DISCONTINUED] oxyCODONE (ROXICODONE) 10 MG tablet take 1 tablet (10 mg) by oral route every 6 hours as needed for 30 days. M54.12  M51.36 M50.30 120 tablet 0    [DISCONTINUED] oxyCODONE (ROXICODONE) 10 MG tablet take 1 tablet (10 mg) by oral route every 6 hours as needed for 30 days. M54.12  M51.36 M50.30 120 tablet 0    [DISCONTINUED] oxyCODONE (ROXICODONE) 10 MG tablet take 1 tablet (10 mg) by oral route every 6 hours PRN for 30 days. M54.12  M51.36 M50.30 120 tablet 0    [DISCONTINUED] oxyCODONE (ROXICODONE) 10 MG tablet take 1 tablet (10 mg) by oral route every 6 hours PRN for 30 days. M54.12  M51.36 M50.30 120 tablet 0    [DISCONTINUED] hydrOXYzine pamoate (VISTARIL) 50 MG capsule take 1 capsule (50 mg) by oral route  one hour prior to procedure (Patient not taking: Reported on 1/5/2024) 1 capsule 0     No current facility-administered medications on file prior to visit.       The following portions of the patient's history were reviewed and updated as appropriate: allergies, current medications, past family history, past medical history, past social history, past surgical history, and problem list.    Review of Systems   Constitutional:  Negative for chills, diaphoresis and fever.   HENT:  Positive for congestion. Negative for hearing loss and trouble swallowing.    Respiratory:  Negative for shortness of breath  and wheezing.    Cardiovascular:  Negative for chest pain and palpitations.   Gastrointestinal:  Negative for abdominal pain, blood in stool, constipation, diarrhea, vomiting and GERD.   Genitourinary:  Positive for erectile dysfunction. Negative for dysuria.   Neurological:  Negative for seizures and syncope.          Objective   Physical Exam  HENT:      Ears:      Comments: Tympanic membranes are normal.      Mouth/Throat:      Comments: Throat looks normal. Patient does have double uvula.  Eyes:      Comments: Pupils are equal and reactive to light.    Neck:      Comments: No thyromegaly, thyroid masses, cervical or suboccipital adenopathy.  Cardiovascular:      Pulses:           Dorsalis pedis pulses are 1+ on the right side and 1+ on the left side.        Posterior tibial pulses are 1+ on the right side and 1+ on the left side.      Comments: Rate and rhythm are normal. No heart murmurs.  Abdominal:      Comments: Bowel sounds are normoactive. He does have an umbilical mass which appears to be an umbilical hernia that is about 3 fingerbreadths.   Musculoskeletal:      Comments: No pretibial edema.   Feet:      Right foot:      Protective Sensation: 10 sites tested.  10 sites sensed.      Skin integrity: Skin integrity normal. Callus present.      Toenail Condition: Right toenails are normal.      Left foot:      Protective Sensation: 10 sites tested.  10 sites sensed.      Skin integrity: Skin integrity normal. Callus present.      Toenail Condition: Left toenails are normal.      Comments: Diabetic Foot Exam Performed and Monofilament Test Performed           PHQ-9 Total Score: 0    Assessment & Plan   Diagnoses and all orders for this visit:    1. Encounter for annual general medical examination with abnormal findings in adult (Primary)    2. Screening for colon cancer  -     Cologuard - Stool, Per Rectum; Future    3. Screening for prostate cancer  -     PSA Screen    4. Acute cough    5. Congestion of  respiratory tract    6. Coronary artery disease involving native coronary artery of native heart without angina pectoris    7. Essential hypertension  -     CBC Auto Differential  -     Comprehensive Metabolic Panel    8. Gout, unspecified cause, unspecified chronicity, unspecified site  -     Uric Acid    9. Mixed hyperlipidemia  -     Lipid Panel    10. B12 deficiency  -     Vitamin B12    11. Type 2 diabetes mellitus with hyperglycemia, without long-term current use of insulin  -     Hemoglobin A1c  -     TSH    12. Vitamin D deficiency  -     Vitamin D,25-Hydroxy    13. Carotid stenosis, asymptomatic, bilateral  -     Duplex Carotid Ultrasound CAR; Future    14. Heart murmur  -     Ambulatory Referral to Cardiology    15. MELISSA (obstructive sleep apnea)    16. Hemoptysis    17. Hematochezia  -     Magnesium    18. Chest congestion  -     POCT SARS-CoV-2 + Flu Antigen NEHEMIAS    19. Class 2 severe obesity with serious comorbidity and body mass index (BMI) of 35.0 to 35.9 in adult, unspecified obesity type    Other orders  -     cetirizine (zyrTEC) 10 MG tablet; Take 1 tablet by mouth Daily.  Dispense: 90 tablet; Refill: 3      Encounter for annual general medical examination with abnormal findings in adult.  Recommended Prevnar 20 and RSV vaccines. Recommended the use of sunscreen and seatbelt.     Screening for colon cancer.  Cologuard ordered.     Screening for prostate cancer.  PSA ordered.     Acute cough.   Patient will be started on cetirizine 10 mg once a day.     Coronary artery disease involving native coronary artery of native heart without angina pectoris.  Patient will continue to follow-up with his cardiologist.     Essential hypertension.   CMP and CBC were ordered.     Gout.   Uric acid ordered.     Mixed hyperlipidemia.   Lipid panel ordered. Advised patient to monitor saturated fat intake.     Vitamin B12 deficiency.   Vitamin B12 level ordered.     Vitamin D deficiency.   Vitamin D level ordered.      Type 2 diabetes mellitus with hyperglycemia, without long-term current use of insulin.  Hemoglobin A1c ordered. He will continue to follow-up with his endocrinologist.     Carotid stenosis, asymptomatic, bilateral.  Duplex ultrasound of the carotids was ordered for further evaluation.     Heart murmur.   Patient was referred to cardiology for further evaluation and management.     MELISSA (obstructive sleep apnea).  Patient is not using his CPAP machine. Recommended CPAP use.     Hematochezia.   Recommended colonoscopy.     Chest congestion.   Patient was tested for COVID-19 and influenza.     Class 2 severe obesity with serious comorbidity and body mass index (BMI) of 35.0 to 35.9 in adult, unspecified obesity type.   Advised patient to monitor food portion size and increase walking.               Patient Instructions     Health Maintenance Due   Topic Date Due    TDAP/TD VACCINES (1 - Tdap) Never done    ZOSTER VACCINE (1 of 2) Never done    Pneumococcal Vaccine 0-64 (3 of 3 - PPSV23 or PCV20) 01/01/2018    COLORECTAL CANCER SCREENING  03/02/2022    BMI FOLLOWUP  03/09/2023    ANNUAL PHYSICAL  06/14/2023    PROSTATE CANCER SCREENING  09/29/2023    HEMOGLOBIN A1C  11/16/2023    Check blood pressure cuff for accuracy and send 10 blood pressures over 2 weeks.  Watch sodium, alcohol and weight     Consider gettting RSV and Prevnar 20 at pharmacy    12 hour fast for labs    Patient needs to take CPAP to [facility]    [id number] advised to use Flonase     Transcribed from ambient dictation for Kavitha Teran MD by Marjan Rosario.  01/05/24   12:14 EST    Patient or patient representative verbalized consent to the visit recording.  I have personally performed the services described in this document as transcribed by the above individual, and it is both accurate and complete.

## 2024-01-09 ENCOUNTER — TELEPHONE (OUTPATIENT)
Dept: FAMILY MEDICINE CLINIC | Facility: CLINIC | Age: 64
End: 2024-01-09
Payer: MEDICAID

## 2024-01-09 NOTE — TELEPHONE ENCOUNTER
Actually the cetirizine sometimes causes low blood pressure but has not been shown to cause high blood pressure.  Are you taking any medications over-the-counter to help with the congestion?  If you have not started any other medicines besides the cetirizine I would stop the cetirizine and see what happens to your blood pressure

## 2024-01-09 NOTE — TELEPHONE ENCOUNTER
Patient just left pain management and BP is 195/103    Concerned with medications that was started on Friday may be increasing BP.  Please advise.

## 2024-01-11 ENCOUNTER — TELEPHONE (OUTPATIENT)
Dept: CARDIOLOGY | Facility: CLINIC | Age: 64
End: 2024-01-11

## 2024-01-11 NOTE — TELEPHONE ENCOUNTER
Caller: Dawson Lomax    Relationship: Self    Best call back number: [phone]     What is the best time to reach you: ANYTIME    Who are you requesting to speak with (clinical staff, provider,  specific staff member): CLINICAL    Do you know the name of the person who called: NA    What was the call regarding: PT REACHING OUT TO SPEAK WITH SOMEONE CLINICAL - HE STATES HE IS STILL TRYING TO GET IN TOMORROW TO BE SEEN IF POSSIBLE AND HADNT HEARD ANYTHING -    Is it okay if the provider responds through [employer]: CALL BACK PLEASE

## 2024-01-11 NOTE — TELEPHONE ENCOUNTER
Caller: Dawson Lomax     Best call back number: [phone]     What is your medical concern? PT STATES HE IS HAVING BP ISSUES - WHEN PT WAS AT HIS PM APPT HIS BP WAS READING 198/102 - THIS MORNING IT /86 AND PT SEEKING ADVISEMENT ON WHETHER SOONER APPT IS NEEDED - PT STATES HE HAS BEEN OFF OF WORK AND HE IS OFF TOMORROW IF POSSIBLE TO GET HIM IN

## 2024-01-12 ENCOUNTER — OFFICE VISIT (OUTPATIENT)
Dept: CARDIOLOGY | Facility: CLINIC | Age: 64
End: 2024-01-12
Payer: MEDICAID

## 2024-01-12 VITALS
BODY MASS INDEX: 35.68 KG/M2 | HEART RATE: 81 BPM | DIASTOLIC BLOOD PRESSURE: 80 MMHG | SYSTOLIC BLOOD PRESSURE: 139 MMHG | HEIGHT: 66 IN | WEIGHT: 222 LBS | OXYGEN SATURATION: 94 % | RESPIRATION RATE: 16 BRPM

## 2024-01-12 DIAGNOSIS — I25.10 CORONARY ARTERY DISEASE INVOLVING NATIVE CORONARY ARTERY OF NATIVE HEART WITHOUT ANGINA PECTORIS: Primary | ICD-10-CM

## 2024-01-12 DIAGNOSIS — E78.2 MIXED HYPERLIPIDEMIA: ICD-10-CM

## 2024-01-12 DIAGNOSIS — I10 ESSENTIAL HYPERTENSION: ICD-10-CM

## 2024-01-12 PROCEDURE — 3079F DIAST BP 80-89 MM HG: CPT | Performed by: INTERNAL MEDICINE

## 2024-01-12 PROCEDURE — 3075F SYST BP GE 130 - 139MM HG: CPT | Performed by: INTERNAL MEDICINE

## 2024-01-12 PROCEDURE — 93000 ELECTROCARDIOGRAM COMPLETE: CPT | Performed by: INTERNAL MEDICINE

## 2024-01-12 PROCEDURE — 1159F MED LIST DOCD IN RCRD: CPT | Performed by: INTERNAL MEDICINE

## 2024-01-12 PROCEDURE — 99214 OFFICE O/P EST MOD 30 MIN: CPT | Performed by: INTERNAL MEDICINE

## 2024-01-12 PROCEDURE — 1160F RVW MEDS BY RX/DR IN RCRD: CPT | Performed by: INTERNAL MEDICINE

## 2024-01-12 RX ORDER — NEBIVOLOL 5 MG/1
5 TABLET ORAL DAILY
Qty: 90 TABLET | Refills: 1 | Status: SHIPPED | OUTPATIENT
Start: 2024-01-12

## 2024-01-12 RX ORDER — LOSARTAN POTASSIUM 50 MG/1
50 TABLET ORAL 2 TIMES DAILY
Qty: 180 TABLET | Refills: 1 | Status: SHIPPED | OUTPATIENT
Start: 2024-01-12
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
## 2024-01-26 ENCOUNTER — HOSPITAL ENCOUNTER (OUTPATIENT)
Dept: CARDIOLOGY | Facility: HOSPITAL | Age: 64
Discharge: HOME | End: 2024-01-26
Payer: MEDICAID

## 2024-01-26 DIAGNOSIS — I65.23 CAROTID STENOSIS, ASYMPTOMATIC, BILATERAL: ICD-10-CM

## 2024-01-26 LAB
BH CV XLRA MEAS LEFT DIST CCA EDV: -17.4 CM/SEC
BH CV XLRA MEAS LEFT DIST CCA PSV: -81.4 CM/SEC
BH CV XLRA MEAS LEFT DIST ICA EDV: -18.8 CM/SEC
BH CV XLRA MEAS LEFT DIST ICA PSV: -62.3 CM/SEC
BH CV XLRA MEAS LEFT ICA/CCA RATIO: 0.5
BH CV XLRA MEAS LEFT PROX CCA EDV: 18.6 CM/SEC
BH CV XLRA MEAS LEFT PROX CCA PSV: 115 CM/SEC
BH CV XLRA MEAS LEFT PROX ECA PSV: -100 CM/SEC
BH CV XLRA MEAS LEFT PROX ICA EDV: -19.3 CM/SEC
BH CV XLRA MEAS LEFT PROX ICA PSV: -56.5 CM/SEC
BH CV XLRA MEAS LEFT PROX SCLA PSV: -76.4 CM/SEC
BH CV XLRA MEAS LEFT VERTEBRAL A PSV: -33.8 CM/SEC
BH CV XLRA MEAS RIGHT DIST CCA EDV: -17.4 CM/SEC
BH CV XLRA MEAS RIGHT DIST CCA PSV: -72.7 CM/SEC
BH CV XLRA MEAS RIGHT DIST ICA EDV: -23 CM/SEC
BH CV XLRA MEAS RIGHT DIST ICA PSV: -75.2 CM/SEC
BH CV XLRA MEAS RIGHT ICA/CCA RATIO: 1
BH CV XLRA MEAS RIGHT PROX CCA EDV: 14.3 CM/SEC
BH CV XLRA MEAS RIGHT PROX CCA PSV: 76.4 CM/SEC
BH CV XLRA MEAS RIGHT PROX ECA PSV: -113 CM/SEC
BH CV XLRA MEAS RIGHT PROX ICA EDV: -21.1 CM/SEC
BH CV XLRA MEAS RIGHT PROX ICA PSV: -62.7 CM/SEC
BH CV XLRA MEAS RIGHT PROX SCLA PSV: -90.7 CM/SEC
BH CV XLRA MEAS RIGHT VERTEBRAL A PSV: -62.1 CM/SEC

## 2024-01-26 PROCEDURE — 93880 EXTRACRANIAL BILAT STUDY: CPT

## 2024-01-26 NOTE — PROGRESS NOTES
Less than 50% blockage was evident on your carotid Doppler.  Should consider repeating again in a year.

## 2024-01-29 ENCOUNTER — TELEPHONE (OUTPATIENT)
Dept: FAMILY MEDICINE CLINIC | Facility: CLINIC | Age: 64
End: 2024-01-29
Payer: MEDICAID

## 2024-01-29 NOTE — TELEPHONE ENCOUNTER
HUB TO READ  ---- Message from Kavitha Teran MD sent at 1/26/2024  6:04 PM EST -----  Less than 50% blockage was evident on your carotid Doppler.  Should consider repeating again in a year.

## 2024-02-07 RX ORDER — HYDROCHLOROTHIAZIDE 12.5 MG/1
12.5 CAPSULE, GELATIN COATED ORAL DAILY
Qty: 90 CAPSULE | Refills: 0 | Status: SHIPPED | OUTPATIENT
Start: 2024-02-07

## 2024-02-23 ENCOUNTER — TELEPHONE (OUTPATIENT)
Dept: ENDOCRINOLOGY | Facility: CLINIC | Age: 64
End: 2024-02-23
Payer: MEDICAID

## 2024-02-23 NOTE — TELEPHONE ENCOUNTER
Received a fax that we needed a PA for the medication Mounjaro 7.5 mg/0.5 ml pen-injectors. At this time, this MA did the PA and it has been approved:    (Key: [id number])  PA Case ID #: [id number]  Need Help? Call us at [phone]  Outcome  Approved today  PA Case: [id number], Status: Approved, Coverage Starts on: 2/23/2024 12:00:00 AM, Coverage Ends on: 2/22/2025 12:00:00 AM.  Authorization Expiration Date: 2/21/2025  Drug  Mounjaro 7.5MG/0.5ML pen-injectors  ePA cloud logo  Form  [employer] Electronic PA Form (2017 NCPDP)    At this time, this MA informed the patient and he expresses understanding at this time.

## 2024-04-10 PROBLEM — L03.116 CELLULITIS OF LEFT LEG: Status: RESOLVED | Noted: 2022-06-14 | Resolved: 2024-04-10

## 2024-04-22 ENCOUNTER — OFFICE VISIT (OUTPATIENT)
Dept: ENDOCRINOLOGY | Facility: CLINIC | Age: 64
End: 2024-04-22
Payer: MEDICAID

## 2024-04-22 VITALS
HEART RATE: 70 BPM | OXYGEN SATURATION: 96 % | DIASTOLIC BLOOD PRESSURE: 72 MMHG | BODY MASS INDEX: 35.2 KG/M2 | HEIGHT: 66 IN | SYSTOLIC BLOOD PRESSURE: 140 MMHG | WEIGHT: 219 LBS

## 2024-04-22 DIAGNOSIS — E11.65 TYPE 2 DIABETES MELLITUS WITH HYPERGLYCEMIA, WITHOUT LONG-TERM CURRENT USE OF INSULIN: Primary | ICD-10-CM

## 2024-04-22 DIAGNOSIS — I10 ESSENTIAL HYPERTENSION: ICD-10-CM

## 2024-04-22 DIAGNOSIS — E66.01 CLASS 2 SEVERE OBESITY DUE TO EXCESS CALORIES WITH SERIOUS COMORBIDITY AND BODY MASS INDEX (BMI) OF 35.0 TO 35.9 IN ADULT: ICD-10-CM

## 2024-04-22 DIAGNOSIS — E78.2 MIXED HYPERLIPIDEMIA: ICD-10-CM

## 2024-04-22 LAB — GLUCOSE BLDC GLUCOMTR-MCNC: 199 MG/DL (ref 70–105)

## 2024-04-22 PROCEDURE — 99214 OFFICE O/P EST MOD 30 MIN: CPT | Performed by: INTERNAL MEDICINE

## 2024-04-22 PROCEDURE — 3077F SYST BP >= 140 MM HG: CPT | Performed by: INTERNAL MEDICINE

## 2024-04-22 PROCEDURE — 1160F RVW MEDS BY RX/DR IN RCRD: CPT | Performed by: INTERNAL MEDICINE

## 2024-04-22 PROCEDURE — 3078F DIAST BP <80 MM HG: CPT | Performed by: INTERNAL MEDICINE

## 2024-04-22 PROCEDURE — 82948 REAGENT STRIP/BLOOD GLUCOSE: CPT | Performed by: INTERNAL MEDICINE

## 2024-04-22 PROCEDURE — 1159F MED LIST DOCD IN RCRD: CPT | Performed by: INTERNAL MEDICINE

## 2024-04-22 RX ORDER — METFORMIN HYDROCHLORIDE 500 MG/1
1000 TABLET, EXTENDED RELEASE ORAL 2 TIMES DAILY
Qty: 180 TABLET | Refills: 4 | Status: SHIPPED | OUTPATIENT
Start: 2024-04-22
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
## 2024-04-25 ENCOUNTER — OFFICE VISIT (OUTPATIENT)
Dept: CARDIOLOGY | Facility: CLINIC | Age: 64
End: 2024-04-25
Payer: MEDICAID

## 2024-04-25 ENCOUNTER — LAB (OUTPATIENT)
Dept: LAB | Facility: HOSPITAL | Age: 64
End: 2024-04-25
Payer: MEDICAID

## 2024-04-25 VITALS
DIASTOLIC BLOOD PRESSURE: 71 MMHG | WEIGHT: 221 LBS | HEIGHT: 66 IN | BODY MASS INDEX: 35.52 KG/M2 | RESPIRATION RATE: 18 BRPM | HEART RATE: 66 BPM | SYSTOLIC BLOOD PRESSURE: 133 MMHG

## 2024-04-25 DIAGNOSIS — I10 ESSENTIAL HYPERTENSION: ICD-10-CM

## 2024-04-25 DIAGNOSIS — E78.2 MIXED HYPERLIPIDEMIA: ICD-10-CM

## 2024-04-25 DIAGNOSIS — I25.10 CORONARY ARTERY DISEASE INVOLVING NATIVE CORONARY ARTERY OF NATIVE HEART WITHOUT ANGINA PECTORIS: ICD-10-CM

## 2024-04-25 DIAGNOSIS — E11.65 TYPE 2 DIABETES MELLITUS WITH HYPERGLYCEMIA, WITHOUT LONG-TERM CURRENT USE OF INSULIN: ICD-10-CM

## 2024-04-25 DIAGNOSIS — Z09 FOLLOW-UP EXAM: Primary | ICD-10-CM

## 2024-04-25 LAB
ALBUMIN SERPL-MCNC: 4.3 G/DL (ref 3.5–5.2)
ALBUMIN UR-MCNC: 4.7 MG/DL
ALBUMIN/GLOB SERPL: 1.4 G/DL
ALP SERPL-CCNC: 71 U/L (ref 39–117)
ALT SERPL W P-5'-P-CCNC: 25 U/L (ref 1–41)
ANION GAP SERPL CALCULATED.3IONS-SCNC: 12 MMOL/L (ref 5–15)
AST SERPL-CCNC: 16 U/L (ref 1–40)
BILIRUB SERPL-MCNC: 0.5 MG/DL (ref 0–1.2)
BUN SERPL-MCNC: 12 MG/DL (ref 8–23)
BUN/CREAT SERPL: 15 (ref 7–25)
CALCIUM SPEC-SCNC: 9.3 MG/DL (ref 8.6–10.5)
CHLORIDE SERPL-SCNC: 102 MMOL/L (ref 98–107)
CHOLEST SERPL-MCNC: 168 MG/DL (ref 0–200)
CO2 SERPL-SCNC: 22 MMOL/L (ref 22–29)
CREAT SERPL-MCNC: 0.8 MG/DL (ref 0.76–1.27)
CREAT UR-MCNC: 29.8 MG/DL
EGFRCR SERPLBLD CKD-EPI 2021: 99.4 ML/MIN/1.73
GLOBULIN UR ELPH-MCNC: 3 GM/DL
GLUCOSE SERPL-MCNC: 234 MG/DL (ref 65–99)
HBA1C MFR BLD: 11 % (ref 4.8–5.6)
HDLC SERPL-MCNC: 29 MG/DL (ref 40–60)
LDLC SERPL CALC-MCNC: 75 MG/DL (ref 0–100)
LDLC/HDLC SERPL: 2.04 {RATIO}
MICROALBUMIN/CREAT UR: 157.7 MG/G (ref 0–29)
POTASSIUM SERPL-SCNC: 3.9 MMOL/L (ref 3.5–5.2)
PROT SERPL-MCNC: 7.3 G/DL (ref 6–8.5)
SODIUM SERPL-SCNC: 136 MMOL/L (ref 136–145)
TRIGL SERPL-MCNC: 399 MG/DL (ref 0–150)
VLDLC SERPL-MCNC: 64 MG/DL (ref 5–40)

## 2024-04-25 PROCEDURE — 80061 LIPID PANEL: CPT

## 2024-04-25 PROCEDURE — 82570 ASSAY OF URINE CREATININE: CPT

## 2024-04-25 PROCEDURE — 83036 HEMOGLOBIN GLYCOSYLATED A1C: CPT

## 2024-04-25 PROCEDURE — 80053 COMPREHEN METABOLIC PANEL: CPT

## 2024-04-25 PROCEDURE — 36415 COLL VENOUS BLD VENIPUNCTURE: CPT

## 2024-04-25 PROCEDURE — 82043 UR ALBUMIN QUANTITATIVE: CPT
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
## 2024-04-26 RX ORDER — INSULIN DEGLUDEC 100 U/ML
INJECTION, SOLUTION SUBCUTANEOUS
Qty: 15 ML | Refills: 6 | Status: SHIPPED | OUTPATIENT
Start: 2024-04-26

## 2024-04-26 RX ORDER — FLURBIPROFEN SODIUM 0.3 MG/ML
SOLUTION/ DROPS OPHTHALMIC
Qty: 100 EACH | Refills: 6 | Status: SHIPPED | OUTPATIENT
Start: 2024-04-26

## 2024-05-15 RX ORDER — HYDROCHLOROTHIAZIDE 12.5 MG/1
12.5 CAPSULE, GELATIN COATED ORAL DAILY
Qty: 90 CAPSULE | Refills: 0 | Status: SHIPPED | OUTPATIENT
Start: 2024-05-15

## 2024-05-15 NOTE — TELEPHONE ENCOUNTER
Caller: Dawson Lomax    Relationship: Self    Best call back number: [phone]     Requested Prescriptions:   Requested Prescriptions     Pending Prescriptions Disp Refills    hydroCHLOROthiazide (MICROZIDE) 12.5 MG capsule 90 capsule 0     Sig: Take 1 capsule by mouth Daily.        Pharmacy where request should be sent: [facility] [address] [place], [place] [address] [place] [phone] [place] [phone] [facility]     Last office visit with prescribing clinician: 1/5/2024   Last telemedicine visit with prescribing clinician: Visit date not found   Next office visit with prescribing clinician: Visit date not found     Does the patient have less than a 3 day supply:  [x] Yes  [] No    Yoandy Calle Rep   05/15/24 10:35 EDT

## 2024-05-20 ENCOUNTER — LAB (OUTPATIENT)
Dept: FAMILY MEDICINE CLINIC | Facility: CLINIC | Age: 64
End: 2024-05-20
Payer: MEDICAID

## 2024-05-20 LAB
25(OH)D3 SERPL-MCNC: 17.3 NG/ML (ref 30–100)
BASOPHILS # BLD AUTO: 0.1 10*3/MM3 (ref 0–0.2)
BASOPHILS NFR BLD AUTO: 0.8 % (ref 0–1.5)
CHOLEST SERPL-MCNC: 147 MG/DL (ref 0–200)
DEPRECATED RDW RBC AUTO: 44.2 FL (ref 37–54)
EOSINOPHIL # BLD AUTO: 0.09 10*3/MM3 (ref 0–0.4)
EOSINOPHIL NFR BLD AUTO: 0.7 % (ref 0.3–6.2)
ERYTHROCYTE [DISTWIDTH] IN BLOOD BY AUTOMATED COUNT: 13 % (ref 12.3–15.4)
HCT VFR BLD AUTO: 48 % (ref 37.5–51)
HDLC SERPL-MCNC: 27 MG/DL (ref 40–60)
HGB BLD-MCNC: 16.3 G/DL (ref 13–17.7)
IMM GRANULOCYTES # BLD AUTO: 0.06 10*3/MM3 (ref 0–0.05)
IMM GRANULOCYTES NFR BLD AUTO: 0.5 % (ref 0–0.5)
LDLC SERPL CALC-MCNC: 85 MG/DL (ref 0–100)
LDLC/HDLC SERPL: 2.93 {RATIO}
LYMPHOCYTES # BLD AUTO: 4.76 10*3/MM3 (ref 0.7–3.1)
LYMPHOCYTES NFR BLD AUTO: 36.9 % (ref 19.6–45.3)
MAGNESIUM SERPL-MCNC: 2 MG/DL (ref 1.6–2.4)
MCH RBC QN AUTO: 32.1 PG (ref 26.6–33)
MCHC RBC AUTO-ENTMCNC: 34 G/DL (ref 31.5–35.7)
MCV RBC AUTO: 94.5 FL (ref 79–97)
MONOCYTES # BLD AUTO: 1.63 10*3/MM3 (ref 0.1–0.9)
MONOCYTES NFR BLD AUTO: 12.6 % (ref 5–12)
NEUTROPHILS NFR BLD AUTO: 48.5 % (ref 42.7–76)
NEUTROPHILS NFR BLD AUTO: 6.27 10*3/MM3 (ref 1.7–7)
NRBC BLD AUTO-RTO: 0 /100 WBC (ref 0–0.2)
PLATELET # BLD AUTO: 363 10*3/MM3 (ref 140–450)
PMV BLD AUTO: 9.8 FL (ref 6–12)
PSA SERPL-MCNC: 0.8 NG/ML (ref 0–4)
RBC # BLD AUTO: 5.08 10*6/MM3 (ref 4.14–5.8)
TRIGL SERPL-MCNC: 204 MG/DL (ref 0–150)
TSH SERPL DL<=0.05 MIU/L-ACNC: 3.01 UIU/ML (ref 0.27–4.2)
URATE SERPL-MCNC: 5.6 MG/DL (ref 3.4–7)
VIT B12 BLD-MCNC: 380 PG/ML (ref 211–946)
VLDLC SERPL-MCNC: 35 MG/DL (ref 5–40)
WBC NRBC COR # BLD AUTO: 12.91 10*3/MM3 (ref 3.4–10.8)

## 2024-05-20 PROCEDURE — 83735 ASSAY OF MAGNESIUM: CPT | Performed by: PREVENTIVE MEDICINE

## 2024-05-20 PROCEDURE — 82607 VITAMIN B-12: CPT | Performed by: PREVENTIVE MEDICINE

## 2024-05-20 PROCEDURE — 84550 ASSAY OF BLOOD/URIC ACID: CPT | Performed by: PREVENTIVE MEDICINE

## 2024-05-20 PROCEDURE — 85025 COMPLETE CBC W/AUTO DIFF WBC: CPT | Performed by: PREVENTIVE MEDICINE

## 2024-05-20 PROCEDURE — 82306 VITAMIN D 25 HYDROXY: CPT | Performed by: PREVENTIVE MEDICINE

## 2024-05-20 PROCEDURE — 80061 LIPID PANEL: CPT | Performed by: PREVENTIVE MEDICINE

## 2024-05-20 PROCEDURE — 36415 COLL VENOUS BLD VENIPUNCTURE: CPT | Performed by: PREVENTIVE MEDICINE

## 2024-05-20 PROCEDURE — 84443 ASSAY THYROID STIM HORMONE: CPT | Performed by: PREVENTIVE MEDICINE

## 2024-05-20 PROCEDURE — G0103 PSA SCREENING: HCPCS | Performed by: PREVENTIVE MEDICINE

## 2024-05-21 ENCOUNTER — TELEPHONE (OUTPATIENT)
Dept: FAMILY MEDICINE CLINIC | Facility: CLINIC | Age: 64
End: 2024-05-21
Payer: MEDICAID

## 2024-05-21 RX ORDER — ATORVASTATIN CALCIUM 20 MG/1
20 TABLET, FILM COATED ORAL DAILY
Qty: 90 TABLET | Refills: 1 | Status: SHIPPED | OUTPATIENT
Start: 2024-05-21

## 2024-05-21 NOTE — TELEPHONE ENCOUNTER
"Relay     \"Bad cholesterol is 85 and goal is below 70 if you have done all you can with diet and exercise and since you are diabetic you should consider a low-dose statin is it okay to call 1 in?  White blood cell count is again elevated which is probably due to your diabetes and finally both vitamin B12 and D are low so increase to 1000 units daily over-the-counter call if any other questions or concerns and let us know about the above\"                "

## 2024-05-29 RX ORDER — EMPAGLIFLOZIN 25 MG/1
TABLET, FILM COATED ORAL
Qty: 90 TABLET | Refills: 0 | Status: SHIPPED | OUTPATIENT
Start: 2024-05-29

## 2024-07-05 RX ORDER — DILTIAZEM HYDROCHLORIDE 360 MG/1
360 CAPSULE, EXTENDED RELEASE ORAL DAILY
Qty: 90 CAPSULE | Refills: 0 | Status: SHIPPED | OUTPATIENT
Start: 2024-07-05

## 2024-07-05 NOTE — TELEPHONE ENCOUNTER
Rx Refill Note  Requested Prescriptions     Pending Prescriptions Disp Refills    dilTIAZem (TIAZAC) 360 MG 24 hr capsule [Pharmacy Med Name: dilTIAZem HCl ER Beads 360 MG Oral Capsule Extended Release 24 Hour] 90 capsule 0     Sig: Take 1 capsule by mouth once daily      Last office visit with prescribing clinician: 1/5/2024   Last telemedicine visit with prescribing clinician: Visit date not found   Next office visit with prescribing clinician: Visit date not found                         Would you like a call back once the refill request has been completed: [] Yes [] No    If the office needs to give you a call back, can they leave a voicemail: [] Yes [] No    Zenobia Lester MA  07/05/24, 09:30 EDT
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
## 2024-07-11 DIAGNOSIS — I10 ESSENTIAL HYPERTENSION: ICD-10-CM

## 2024-07-11 DIAGNOSIS — I25.10 CORONARY ARTERY DISEASE INVOLVING NATIVE CORONARY ARTERY OF NATIVE HEART WITHOUT ANGINA PECTORIS: ICD-10-CM

## 2024-07-11 DIAGNOSIS — E78.2 MIXED HYPERLIPIDEMIA: ICD-10-CM

## 2024-07-11 RX ORDER — NEBIVOLOL 5 MG/1
5 TABLET ORAL DAILY
Qty: 90 TABLET | Refills: 0 | Status: SHIPPED | OUTPATIENT
Start: 2024-07-11 | End: 2024-07-12

## 2024-07-12 ENCOUNTER — OFFICE VISIT (OUTPATIENT)
Dept: CARDIOLOGY | Facility: CLINIC | Age: 64
End: 2024-07-12
Payer: MEDICAID

## 2024-07-12 VITALS
SYSTOLIC BLOOD PRESSURE: 163 MMHG | BODY MASS INDEX: 34.87 KG/M2 | DIASTOLIC BLOOD PRESSURE: 75 MMHG | OXYGEN SATURATION: 93 % | WEIGHT: 217 LBS | HEIGHT: 66 IN | HEART RATE: 67 BPM | RESPIRATION RATE: 16 BRPM

## 2024-07-12 DIAGNOSIS — M10.9 GOUT, UNSPECIFIED CAUSE, UNSPECIFIED CHRONICITY, UNSPECIFIED SITE: Chronic | ICD-10-CM

## 2024-07-12 DIAGNOSIS — I25.10 CORONARY ARTERY DISEASE INVOLVING NATIVE CORONARY ARTERY OF NATIVE HEART WITHOUT ANGINA PECTORIS: Primary | ICD-10-CM

## 2024-07-12 DIAGNOSIS — E11.65 TYPE 2 DIABETES MELLITUS WITH HYPERGLYCEMIA, WITHOUT LONG-TERM CURRENT USE OF INSULIN: ICD-10-CM

## 2024-07-12 DIAGNOSIS — I10 ESSENTIAL HYPERTENSION: ICD-10-CM

## 2024-07-12 DIAGNOSIS — E78.2 MIXED HYPERLIPIDEMIA: ICD-10-CM

## 2024-07-12 PROCEDURE — 3077F SYST BP >= 140 MM HG: CPT | Performed by: INTERNAL MEDICINE

## 2024-07-12 PROCEDURE — 1159F MED LIST DOCD IN RCRD: CPT | Performed by: INTERNAL MEDICINE

## 2024-07-12 PROCEDURE — 1160F RVW MEDS BY RX/DR IN RCRD: CPT | Performed by: INTERNAL MEDICINE

## 2024-07-12 PROCEDURE — 99214 OFFICE O/P EST MOD 30 MIN: CPT | Performed by: INTERNAL MEDICINE

## 2024-07-12 PROCEDURE — 3078F DIAST BP <80 MM HG: CPT | Performed by: INTERNAL MEDICINE

## 2024-07-12 RX ORDER — HYDRALAZINE HYDROCHLORIDE 25 MG/1
25 TABLET, FILM COATED ORAL 2 TIMES DAILY
Qty: 180 TABLET | Refills: 1 | Status: SHIPPED | OUTPATIENT
Start: 2024-07-12

## 2024-07-17 NOTE — PATIENT INSTRUCTIONS
Health Maintenance Due   Topic Date Due    TDAP/TD VACCINES (1 - Tdap) Never done    ZOSTER VACCINE (1 of 2) Never done    Pneumococcal Vaccine 0-64 (3 of 3 - PPSV23 or PCV20) 01/01/2018    COLORECTAL CANCER SCREENING  03/02/2022    DIABETIC EYE EXAM  03/16/2024    You are due for adacel Tdap vaccination. (provides protection against tetanus, diptheria and whooping cough) Please  get the immunization at your local pharmacy at your earliest convenience.  Please click on the link for more information about this vaccine.    https://www.cdc.gov/vaccines/vpd/dtap-tdap-td/public/index.html    You are due for Shingrix vaccination series ( the newest shingles vaccine).  It is a two shot series spaced 2-6 months apart. Please get this vaccine series started at your earliest convenience at your local pharmacy to help avoid shingles outbreak. It is more effective than the old Zostavax vaccine and is recommended even if you have had the Zostavax vaccine in the past.  Once the Shingrix series is completed, it does not need to be repeated.   For more information, please look at the website below:  https://www.cdc.gov/vaccines/vpd/shingles/public/shingrix/index.html  12 hour fast for labs

## 2024-07-19 ENCOUNTER — OFFICE VISIT (OUTPATIENT)
Dept: FAMILY MEDICINE CLINIC | Facility: CLINIC | Age: 64
End: 2024-07-19
Payer: MEDICAID

## 2024-07-19 DIAGNOSIS — E78.2 MIXED HYPERLIPIDEMIA: ICD-10-CM

## 2024-07-19 DIAGNOSIS — K92.1 HEMATOCHEZIA: ICD-10-CM

## 2024-07-19 DIAGNOSIS — I10 ESSENTIAL HYPERTENSION: ICD-10-CM

## 2024-07-19 DIAGNOSIS — G47.33 OSA (OBSTRUCTIVE SLEEP APNEA): Chronic | ICD-10-CM

## 2024-07-19 DIAGNOSIS — Z12.11 SCREENING FOR COLON CANCER: ICD-10-CM

## 2024-07-19 DIAGNOSIS — R04.2 HEMOPTYSIS: ICD-10-CM

## 2024-07-19 DIAGNOSIS — I65.23 CAROTID STENOSIS, ASYMPTOMATIC, BILATERAL: ICD-10-CM

## 2024-07-19 DIAGNOSIS — E66.01 CLASS 2 SEVERE OBESITY DUE TO EXCESS CALORIES WITH SERIOUS COMORBIDITY AND BODY MASS INDEX (BMI) OF 35.0 TO 35.9 IN ADULT: ICD-10-CM

## 2024-07-19 DIAGNOSIS — M10.9 GOUT, UNSPECIFIED CAUSE, UNSPECIFIED CHRONICITY, UNSPECIFIED SITE: Chronic | ICD-10-CM

## 2024-07-19 DIAGNOSIS — R01.1 HEART MURMUR: ICD-10-CM

## 2024-07-19 DIAGNOSIS — E53.8 B12 DEFICIENCY: ICD-10-CM

## 2024-07-19 DIAGNOSIS — I25.10 CORONARY ARTERY DISEASE INVOLVING NATIVE CORONARY ARTERY OF NATIVE HEART WITHOUT ANGINA PECTORIS: ICD-10-CM

## 2024-07-19 DIAGNOSIS — E11.65 TYPE 2 DIABETES MELLITUS WITH HYPERGLYCEMIA, WITHOUT LONG-TERM CURRENT USE OF INSULIN: Primary | ICD-10-CM

## 2024-07-19 DIAGNOSIS — E55.9 VITAMIN D DEFICIENCY: ICD-10-CM

## 2024-07-19 DIAGNOSIS — Z12.11 SCREEN FOR COLON CANCER: ICD-10-CM

## 2024-07-19 PROCEDURE — 99214 OFFICE O/P EST MOD 30 MIN: CPT | Performed by: PREVENTIVE MEDICINE

## 2024-07-19 PROCEDURE — 1126F AMNT PAIN NOTED NONE PRSNT: CPT | Performed by: PREVENTIVE MEDICINE

## 2024-07-19 PROCEDURE — 3077F SYST BP >= 140 MM HG: CPT | Performed by: PREVENTIVE MEDICINE

## 2024-07-19 PROCEDURE — 3046F HEMOGLOBIN A1C LEVEL >9.0%: CPT | Performed by: PREVENTIVE MEDICINE

## 2024-07-19 PROCEDURE — 3078F DIAST BP <80 MM HG: CPT | Performed by: PREVENTIVE MEDICINE

## 2024-07-19 PROCEDURE — 1159F MED LIST DOCD IN RCRD: CPT | Performed by: PREVENTIVE MEDICINE

## 2024-07-19 PROCEDURE — 1160F RVW MEDS BY RX/DR IN RCRD: CPT | Performed by: PREVENTIVE MEDICINE

## 2024-07-20 NOTE — PROGRESS NOTES
"Subjective   Dawson Lomax is a 63 y.o. male presents for No chief complaint on file.      Health Maintenance Due   Topic Date Due    TDAP/TD VACCINES (1 - Tdap) Never done    ZOSTER VACCINE (1 of 2) Never done    Pneumococcal Vaccine 0-64 (3 of 3 - PPSV23 or PCV20) 01/01/2018    COLORECTAL CANCER SCREENING  03/02/2022    DIABETIC EYE EXAM  03/16/2024    HEMOGLOBIN A1C  10/25/2024       History of Present Illness   History of Present Illness  Dawson is a 63-year-old male who presents today for follow-up on type 2 diabetes with hyperglycemia without long-term or current use of insulin mixed hyperlipidemia heart murmur gout essential hypertension coronary artery disease without angina class II severe obesity due to excess calories with serious comorbidities and a body mass index of 35 carotid stenosisBilateral asymptomatic B12 deficiency obstructive sleep apnea hemoptysis hematemesis screening for colon cancer and vitamin D deficiency    He states that he has had blood sugars both high and low and we have advised that he does follow-up with Dr. Man before his next visit to see if any changes need to be made he does not really watch his cholesterol has not noticed any chest pain inflammatory arthritis or kidney stones no dizziness or weaknessDoes occasionally take vitamins is unable to sleep with his CPAP all the time however it does improve his sleep quality    He has not noted any hemoptysis or hematochezia and will consider colon cancer screening.  Does not take vitamin D regularly.    Vitals:    07/19/24 0944 07/23/24 0942   BP: 158/78 154/75   BP Location: Left arm Right arm   Patient Position: Sitting Sitting   Cuff Size: Adult Adult   Pulse: 75 75   Temp:  98.2 °F (36.8 °C)   TempSrc:  Temporal   SpO2:  94%   Weight:  98.4 kg (217 lb)   Height:  167.6 cm (65.98\")     Body mass index is 35.05 kg/m².    Current Outpatient Medications on File Prior to Visit   Medication Sig Dispense Refill    aspirin 81 MG EC " tablet Take 1 tablet by mouth Daily.      atorvastatin (LIPITOR) 20 MG tablet Take 1 tablet by mouth Daily. 90 tablet 1    dilTIAZem (TIAZAC) 360 MG 24 hr capsule Take 1 capsule by mouth once daily 90 capsule 0    glyburide (DIAbeta) 5 MG tablet Take 2 tablets by mouth twice daily 360 tablet 4    hydrALAZINE (APRESOLINE) 25 MG tablet Take 1 tablet by mouth 2 (Two) Times a Day. 180 tablet 1    hydroCHLOROthiazide (MICROZIDE) 12.5 MG capsule Take 1 capsule by mouth Daily. 90 capsule 0    Insulin Pen Needle (B-D UF III MINI PEN NEEDLES) 31G X 5 MM misc Used to inject insulin 4 times a day. 100 each 6    ipratropium-albuterol (DUO-NEB) 0.5-2.5 mg/3 ml nebulizer Take 3 mL by nebulization Every 4 (Four) Hours As Needed for Wheezing. 360 mL 3    Jardiance 25 MG tablet tablet Take 1 tablet by mouth once daily 90 tablet 0    losartan (COZAAR) 50 MG tablet Take 1 tablet by mouth 2 (Two) Times a Day. 180 tablet 1    LYRICA 100 MG capsule Take 1 capsule by mouth 2 (Two) Times a Day As Needed (patient states only takes as needed).  0    metFORMIN ER (GLUCOPHAGE-XR) 500 MG 24 hr tablet Take 2 tablets by mouth 2 (Two) Times a Day. 180 tablet 4    oxyCODONE (ROXICODONE) 10 MG tablet take 1 tablet (10 mg) by oral route every 6 hours as needed for 30 days. M54.12  M51.36 M50.30 120 tablet 0    Tirzepatide (Mounjaro) 7.5 MG/0.5ML solution pen-injector Inject 0.5 mL under the skin into the appropriate area as directed 1 (One) Time Per Week. 6 mL 4     No current facility-administered medications on file prior to visit.       The following portions of the patient's history were reviewed and updated as appropriate: allergies, current medications, past family history, past medical history, past social history, past surgical history, and problem list.    Review of Systems   HENT:  Positive for hearing loss.    Cardiovascular:  Negative for chest pain and palpitations.   Gastrointestinal:  Negative for blood in stool.   Neurological:   Negative for dizziness and light-headedness.       Objective   Physical Exam  Vitals reviewed.   Constitutional:       General: He is not in acute distress.     Appearance: He is well-developed. He is obese. He is not ill-appearing or toxic-appearing.   HENT:      Head: Normocephalic and atraumatic.      Right Ear: Tympanic membrane, ear canal and external ear normal.      Left Ear: Tympanic membrane, ear canal and external ear normal.      Nose: Nose normal.      Mouth/Throat:      Mouth: Mucous membranes are moist.      Pharynx: No posterior oropharyngeal erythema.   Eyes:      Extraocular Movements: Extraocular movements intact.      Conjunctiva/sclera: Conjunctivae normal.      Pupils: Pupils are equal, round, and reactive to light.   Cardiovascular:      Rate and Rhythm: Normal rate and regular rhythm.      Pulses:           Dorsalis pedis pulses are 1+ on the right side and 1+ on the left side.        Posterior tibial pulses are 1+ on the right side and 1+ on the left side.      Heart sounds: Murmur heard.   Pulmonary:      Effort: Pulmonary effort is normal.      Breath sounds: Normal breath sounds.   Abdominal:      General: Bowel sounds are normal. There is no distension.      Palpations: Abdomen is soft. There is no mass.      Tenderness: There is no abdominal tenderness.   Musculoskeletal:         General: Normal range of motion.      Cervical back: Neck supple.   Feet:      Right foot:      Protective Sensation: 10 sites tested.  10 sites sensed.      Skin integrity: Skin breakdown and callus present.      Toenail Condition: Right toenails are long.      Left foot:      Protective Sensation: 10 sites tested.  10 sites sensed.      Skin integrity: Skin breakdown and callus present.      Toenail Condition: Left toenails are long.      Comments: Diabetic Foot Exam Performed and Monofilament Test Performed    Skin:     General: Skin is warm.   Neurological:      General: No focal deficit present.      Mental  Status: He is alert and oriented to person, place, and time.   Psychiatric:         Mood and Affect: Mood normal.         Behavior: Behavior normal.       Physical Exam      PHQ-9 Total Score:    Results           Assessment & Plan   Diagnoses and all orders for this visit:    1. Type 2 diabetes mellitus with hyperglycemia, without long-term current use of insulin (Primary)  -     Ambulatory Referral for Diabetic Eye Exam-Ophthalmology  -     Comprehensive Metabolic Panel  -     Hemoglobin A1c  -     Microalbumin / Creatinine Urine Ratio - Urine, Clean Catch  -     TSH Rfx On Abnormal To Free T4    2. Screening for colon cancer    3. Vitamin D deficiency  -     Vitamin D,25-Hydroxy    4. Mixed hyperlipidemia  -     Lipid Panel    5. Heart murmur    6. Gout, unspecified cause, unspecified chronicity, unspecified site  -     Uric Acid    7. Essential hypertension  -     CBC Auto Differential    8. Coronary artery disease involving native coronary artery of native heart without angina pectoris    9. Class 2 severe obesity due to excess calories with serious comorbidity and body mass index (BMI) of 35.0 to 35.9 in adult  Assessment & Plan:  Patient's (There is no height or weight on file to calculate BMI.) indicates that they are morbidly/severely obese (BMI > 40 or > 35 with obesity - related health condition) with health conditions that include hypertension, coronary heart disease, diabetes mellitus, and dyslipidemias . Weight is unchanged. BMI  is above average; BMI management plan is completed. We discussed portion control and increasing exercise.       10. Carotid stenosis, asymptomatic, bilateral    11. B12 deficiency  -     Vitamin B12    12. MELISSA (obstructive sleep apnea)    13. Hemoptysis    14. Hematochezia  -     Magnesium    15. Screen for colon cancer      Assessment & Plan      Patient Instructions     Health Maintenance Due   Topic Date Due    TDAP/TD VACCINES (1 - Tdap) Never done    ZOSTER VACCINE (1 of  2) Never done    Pneumococcal Vaccine 0-64 (3 of 3 - PPSV23 or PCV20) 01/01/2018    COLORECTAL CANCER SCREENING  03/02/2022    DIABETIC EYE EXAM  03/16/2024    You are due for adacel Tdap vaccination. (provides protection against tetanus, diptheria and whooping cough) Please  get the immunization at your local pharmacy at your earliest convenience.  Please click on the link for more information about this vaccine.    https://www.cdc.gov/vaccines/vpd/dtap-tdap-td/public/index.html    You are due for Shingrix vaccination series ( the newest shingles vaccine).  It is a two shot series spaced 2-6 months apart. Please get this vaccine series started at your earliest convenience at your local pharmacy to help avoid shingles outbreak. It is more effective than the old Zostavax vaccine and is recommended even if you have had the Zostavax vaccine in the past.  Once the Shingrix series is completed, it does not need to be repeated.   For more information, please look at the website below:  https://www.cdc.gov/vaccines/vpd/shingles/public/shingrix/index.html  12 hour fast for labs       Patient or patient representative verbalized consent for the use of Ambient Listening during the visit with  Kavitha Teran MD for chart documentation. 7/29/2024  11:31 EDT

## 2024-07-23 VITALS
WEIGHT: 217 LBS | OXYGEN SATURATION: 94 % | HEART RATE: 75 BPM | HEIGHT: 66 IN | DIASTOLIC BLOOD PRESSURE: 75 MMHG | SYSTOLIC BLOOD PRESSURE: 154 MMHG | TEMPERATURE: 98.2 F | BODY MASS INDEX: 34.87 KG/M2

## 2024-08-22 RX ORDER — HYDROCHLOROTHIAZIDE 12.5 MG/1
12.5 CAPSULE, GELATIN COATED ORAL DAILY
Qty: 90 CAPSULE | Refills: 0 | Status: SHIPPED | OUTPATIENT
Start: 2024-08-22

## 2024-08-28 ENCOUNTER — LAB (OUTPATIENT)
Dept: FAMILY MEDICINE CLINIC | Facility: CLINIC | Age: 64
End: 2024-08-28
Payer: MEDICAID

## 2024-08-28 LAB
25(OH)D3 SERPL-MCNC: 23.6 NG/ML (ref 30–100)
ALBUMIN SERPL-MCNC: 4.3 G/DL (ref 3.5–5.2)
ALBUMIN UR-MCNC: 7.8 MG/DL
ALBUMIN/GLOB SERPL: 1.5 G/DL
ALP SERPL-CCNC: 66 U/L (ref 39–117)
ALT SERPL W P-5'-P-CCNC: 27 U/L (ref 1–41)
ANION GAP SERPL CALCULATED.3IONS-SCNC: 9 MMOL/L (ref 5–15)
AST SERPL-CCNC: 20 U/L (ref 1–40)
BASOPHILS # BLD AUTO: 0.11 10*3/MM3 (ref 0–0.2)
BASOPHILS NFR BLD AUTO: 0.8 % (ref 0–1.5)
BILIRUB SERPL-MCNC: 0.7 MG/DL (ref 0–1.2)
BUN SERPL-MCNC: 12 MG/DL (ref 8–23)
BUN/CREAT SERPL: 17.1 (ref 7–25)
CALCIUM SPEC-SCNC: 9.4 MG/DL (ref 8.6–10.5)
CHLORIDE SERPL-SCNC: 105 MMOL/L (ref 98–107)
CHOLEST SERPL-MCNC: 105 MG/DL (ref 0–200)
CO2 SERPL-SCNC: 22 MMOL/L (ref 22–29)
CREAT SERPL-MCNC: 0.7 MG/DL (ref 0.76–1.27)
CREAT UR-MCNC: 47.4 MG/DL
DEPRECATED RDW RBC AUTO: 41.6 FL (ref 37–54)
EGFRCR SERPLBLD CKD-EPI 2021: 103.5 ML/MIN/1.73
EOSINOPHIL # BLD AUTO: 0.09 10*3/MM3 (ref 0–0.4)
EOSINOPHIL NFR BLD AUTO: 0.7 % (ref 0.3–6.2)
ERYTHROCYTE [DISTWIDTH] IN BLOOD BY AUTOMATED COUNT: 12.2 % (ref 12.3–15.4)
GLOBULIN UR ELPH-MCNC: 2.9 GM/DL
GLUCOSE SERPL-MCNC: 132 MG/DL (ref 65–99)
HBA1C MFR BLD: 9.7 % (ref 4.8–5.6)
HCT VFR BLD AUTO: 47 % (ref 37.5–51)
HDLC SERPL-MCNC: 28 MG/DL (ref 40–60)
HGB BLD-MCNC: 15.7 G/DL (ref 13–17.7)
IMM GRANULOCYTES # BLD AUTO: 0.06 10*3/MM3 (ref 0–0.05)
IMM GRANULOCYTES NFR BLD AUTO: 0.4 % (ref 0–0.5)
LDLC SERPL CALC-MCNC: 52 MG/DL (ref 0–100)
LDLC/HDLC SERPL: 1.74 {RATIO}
LYMPHOCYTES # BLD AUTO: 4.96 10*3/MM3 (ref 0.7–3.1)
LYMPHOCYTES NFR BLD AUTO: 36.8 % (ref 19.6–45.3)
MAGNESIUM SERPL-MCNC: 2.2 MG/DL (ref 1.6–2.4)
MCH RBC QN AUTO: 31.5 PG (ref 26.6–33)
MCHC RBC AUTO-ENTMCNC: 33.4 G/DL (ref 31.5–35.7)
MCV RBC AUTO: 94.2 FL (ref 79–97)
MICROALBUMIN/CREAT UR: 164.6 MG/G (ref 0–29)
MONOCYTES # BLD AUTO: 1.69 10*3/MM3 (ref 0.1–0.9)
MONOCYTES NFR BLD AUTO: 12.5 % (ref 5–12)
NEUTROPHILS NFR BLD AUTO: 48.8 % (ref 42.7–76)
NEUTROPHILS NFR BLD AUTO: 6.56 10*3/MM3 (ref 1.7–7)
NRBC BLD AUTO-RTO: 0 /100 WBC (ref 0–0.2)
PLATELET # BLD AUTO: 399 10*3/MM3 (ref 140–450)
PMV BLD AUTO: 9.5 FL (ref 6–12)
POTASSIUM SERPL-SCNC: 4 MMOL/L (ref 3.5–5.2)
PROT SERPL-MCNC: 7.2 G/DL (ref 6–8.5)
RBC # BLD AUTO: 4.99 10*6/MM3 (ref 4.14–5.8)
SODIUM SERPL-SCNC: 136 MMOL/L (ref 136–145)
TRIGL SERPL-MCNC: 142 MG/DL (ref 0–150)
TSH SERPL DL<=0.05 MIU/L-ACNC: 1.95 UIU/ML (ref 0.27–4.2)
URATE SERPL-MCNC: 4.4 MG/DL (ref 3.4–7)
VIT B12 BLD-MCNC: 444 PG/ML (ref 211–946)
VLDLC SERPL-MCNC: 25 MG/DL (ref 5–40)
WBC NRBC COR # BLD AUTO: 13.47 10*3/MM3 (ref 3.4–10.8)

## 2024-08-28 PROCEDURE — 82607 VITAMIN B-12: CPT | Performed by: PREVENTIVE MEDICINE

## 2024-08-28 PROCEDURE — 84550 ASSAY OF BLOOD/URIC ACID: CPT | Performed by: PREVENTIVE MEDICINE

## 2024-08-28 PROCEDURE — 82043 UR ALBUMIN QUANTITATIVE: CPT | Performed by: PREVENTIVE MEDICINE

## 2024-08-28 PROCEDURE — 80061 LIPID PANEL: CPT | Performed by: PREVENTIVE MEDICINE

## 2024-08-28 PROCEDURE — 80050 GENERAL HEALTH PANEL: CPT | Performed by: PREVENTIVE MEDICINE

## 2024-08-28 PROCEDURE — 82570 ASSAY OF URINE CREATININE: CPT | Performed by: PREVENTIVE MEDICINE

## 2024-08-28 PROCEDURE — 83735 ASSAY OF MAGNESIUM: CPT | Performed by: PREVENTIVE MEDICINE

## 2024-08-28 PROCEDURE — 82306 VITAMIN D 25 HYDROXY: CPT | Performed by: PREVENTIVE MEDICINE

## 2024-08-28 PROCEDURE — 83036 HEMOGLOBIN GLYCOSYLATED A1C: CPT | Performed by: PREVENTIVE MEDICINE

## 2024-08-29 ENCOUNTER — TELEPHONE (OUTPATIENT)
Dept: FAMILY MEDICINE CLINIC | Facility: CLINIC | Age: 64
End: 2024-08-29
Payer: MEDICAID

## 2024-08-29 NOTE — TELEPHONE ENCOUNTER
"Relay     \"Blood sugar was 132 and A1c shows that you are not controlled at 9.7-you are also spilling protein in your urine and already you are on losartan and maximum dose of Jardiance.  Please call Dr. Man's office and see if he wishes to change anything before your follow-up in November.  Both vitamin D and B12 are low so increase to 1000 units daily over-the-counter.  Finally your white blood cell count is still elevated and we can continue to monitor or you can see one of the hematology oncology doctors it has been elevated in the past and we can continue to monitor but I also feel as though referral to hematology may be a good idea-looks like you have seen Dr. Yusuf in the past.  You can call and set up the appointment or we can put a new referral and please let us know\"                "

## 2024-08-30 NOTE — TELEPHONE ENCOUNTER
Patient advised of lab results. Verbalized understanding. No questions or concerns at this time. 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 He also stated that His A1C had been 12 so with it being 9 he is happy with that and will continue to work on Diet and Exercise. He stated Dr. 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    Sobia Felix MA  08/30/24, 15:29 EDT

## 2024-09-03 NOTE — TELEPHONE ENCOUNTER
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

## 2024-09-04 RX ORDER — EMPAGLIFLOZIN 25 MG/1
TABLET, FILM COATED ORAL
Qty: 90 TABLET | Refills: 3 | Status: SHIPPED | OUTPATIENT
Start: 2024-09-04

## 2024-10-05 DIAGNOSIS — I25.10 CORONARY ARTERY DISEASE INVOLVING NATIVE CORONARY ARTERY OF NATIVE HEART WITHOUT ANGINA PECTORIS: ICD-10-CM

## 2024-10-05 DIAGNOSIS — E78.2 MIXED HYPERLIPIDEMIA: ICD-10-CM

## 2024-10-05 DIAGNOSIS — I10 ESSENTIAL HYPERTENSION: ICD-10-CM

## 2024-10-06 RX ORDER — DILTIAZEM HYDROCHLORIDE 360 MG/1
360 CAPSULE, EXTENDED RELEASE ORAL DAILY
Qty: 90 CAPSULE | Refills: 0 | Status: SHIPPED | OUTPATIENT
Start: 2024-10-06

## 2024-10-07 RX ORDER — LOSARTAN POTASSIUM 50 MG/1
50 TABLET ORAL 2 TIMES DAILY
Qty: 180 TABLET | Refills: 0 | Status: SHIPPED | OUTPATIENT
Start: 2024-10-07

## 2024-11-01 ENCOUNTER — TELEPHONE (OUTPATIENT)
Dept: ENDOCRINOLOGY | Facility: CLINIC | Age: 64
End: 2024-11-01
Payer: MEDICAID

## 2024-12-02 RX ORDER — HYDROCHLOROTHIAZIDE 12.5 MG/1
12.5 CAPSULE ORAL DAILY
Qty: 90 CAPSULE | Refills: 0 | Status: SHIPPED | OUTPATIENT
Start: 2024-12-02

## 2024-12-02 RX ORDER — ATORVASTATIN CALCIUM 20 MG/1
20 TABLET, FILM COATED ORAL DAILY
Qty: 90 TABLET | Refills: 0 | Status: SHIPPED | OUTPATIENT
Start: 2024-12-02

## 2025-01-07 RX ORDER — GLYBURIDE 5 MG/1
TABLET ORAL
Qty: 120 TABLET | Refills: 0 | Status: SHIPPED | OUTPATIENT
Start: 2025-01-07

## 2025-01-30 RX ORDER — DILTIAZEM HYDROCHLORIDE 360 MG/1
360 CAPSULE, EXTENDED RELEASE ORAL DAILY
Qty: 90 CAPSULE | Refills: 0 | Status: SHIPPED | OUTPATIENT
Start: 2025-01-30

## 2025-01-30 NOTE — TELEPHONE ENCOUNTER
Caller: Dawson Lomxa    Relationship: Self    Best call back number:     Dawson Lomax (Self) [phone] (Mobile)       What was the call regarding: PATIENT IS OUT OF MEDICATION AND IS SCHEDULING AN APPT  FOR REFILLS     CAN YOU CALL IN ENOUGH MEDS TO LAST UNTIL APPT      Is it okay if the provider responds through MyChart:   
MAILED LETTER  
Patient with elevated transaminases and elevated bilirubin, increased from when was at [facility]  CT without hepatosplenomegaly, GB pathology,   Patient with history of EBV  - CMV and mono serologies  - Acute hepatitis panel at OSH was normal, HBV SAb positive only  - US

## 2025-02-07 DIAGNOSIS — E78.2 MIXED HYPERLIPIDEMIA: ICD-10-CM

## 2025-02-07 DIAGNOSIS — I10 ESSENTIAL HYPERTENSION: ICD-10-CM

## 2025-02-07 DIAGNOSIS — I25.10 CORONARY ARTERY DISEASE INVOLVING NATIVE CORONARY ARTERY OF NATIVE HEART WITHOUT ANGINA PECTORIS: ICD-10-CM

## 2025-02-07 RX ORDER — LOSARTAN POTASSIUM 50 MG/1
50 TABLET ORAL 2 TIMES DAILY
Qty: 180 TABLET | Refills: 0 | Status: SHIPPED | OUTPATIENT
Start: 2025-02-07

## 2025-03-05 RX ORDER — HYDROCHLOROTHIAZIDE 12.5 MG/1
12.5 CAPSULE ORAL DAILY
Qty: 90 CAPSULE | Refills: 0 | Status: SHIPPED | OUTPATIENT
Start: 2025-03-05

## 2025-03-07 ENCOUNTER — TELEPHONE (OUTPATIENT)
Dept: FAMILY MEDICINE CLINIC | Facility: CLINIC | Age: 65
End: 2025-03-07
Payer: COMMERCIAL

## 2025-03-07 NOTE — TELEPHONE ENCOUNTER
Patient wants Pain management for lower back pain- states has disc IMPRESSION:   1. Post surgical changes of prior anterior cervical spine fusion are redemonstrated at C6-C7.   2. There is subtle lucency surrounding the vertebral body screws at C7, which may represent loosening and/or excessive motion, and there is no evidence of solid osseous fusion across the interbody graft device or more lateral aspects of the   intervertebral disc space at C6-C7.   3. Mild multilevel degenerative changes of cervical spine, as described above, which appear overall most pronounced at C6-C7, where there is mild spinal canal stenosis and moderate bilateral neural foraminal stenosis.

## 2025-03-10 ENCOUNTER — OFFICE VISIT (OUTPATIENT)
Dept: FAMILY MEDICINE CLINIC | Facility: CLINIC | Age: 65
End: 2025-03-10
Payer: COMMERCIAL

## 2025-03-10 ENCOUNTER — LAB (OUTPATIENT)
Dept: FAMILY MEDICINE CLINIC | Facility: CLINIC | Age: 65
End: 2025-03-10
Payer: COMMERCIAL

## 2025-03-10 VITALS
TEMPERATURE: 99.5 F | WEIGHT: 204.6 LBS | DIASTOLIC BLOOD PRESSURE: 82 MMHG | OXYGEN SATURATION: 92 % | BODY MASS INDEX: 32.88 KG/M2 | HEART RATE: 79 BPM | HEIGHT: 66 IN | SYSTOLIC BLOOD PRESSURE: 162 MMHG

## 2025-03-10 DIAGNOSIS — M10.9 GOUT, UNSPECIFIED CAUSE, UNSPECIFIED CHRONICITY, UNSPECIFIED SITE: Chronic | ICD-10-CM

## 2025-03-10 DIAGNOSIS — I65.23 CAROTID STENOSIS, ASYMPTOMATIC, BILATERAL: ICD-10-CM

## 2025-03-10 DIAGNOSIS — G47.33 OSA (OBSTRUCTIVE SLEEP APNEA): Chronic | ICD-10-CM

## 2025-03-10 DIAGNOSIS — E66.01 CLASS 2 SEVERE OBESITY DUE TO EXCESS CALORIES WITH SERIOUS COMORBIDITY AND BODY MASS INDEX (BMI) OF 35.0 TO 35.9 IN ADULT: ICD-10-CM

## 2025-03-10 DIAGNOSIS — E55.9 VITAMIN D DEFICIENCY: ICD-10-CM

## 2025-03-10 DIAGNOSIS — I10 ESSENTIAL HYPERTENSION: ICD-10-CM

## 2025-03-10 DIAGNOSIS — E11.65 TYPE 2 DIABETES MELLITUS WITH HYPERGLYCEMIA, WITHOUT LONG-TERM CURRENT USE OF INSULIN: ICD-10-CM

## 2025-03-10 DIAGNOSIS — G89.29 CHRONIC LOW BACK PAIN, UNSPECIFIED BACK PAIN LATERALITY, UNSPECIFIED WHETHER SCIATICA PRESENT: Primary | ICD-10-CM

## 2025-03-10 DIAGNOSIS — Z12.11 SCREENING FOR MALIGNANT NEOPLASM OF COLON: ICD-10-CM

## 2025-03-10 DIAGNOSIS — M54.50 CHRONIC LOW BACK PAIN, UNSPECIFIED BACK PAIN LATERALITY, UNSPECIFIED WHETHER SCIATICA PRESENT: Primary | ICD-10-CM

## 2025-03-10 DIAGNOSIS — E53.8 B12 DEFICIENCY: ICD-10-CM

## 2025-03-10 DIAGNOSIS — R35.1 NOCTURIA: ICD-10-CM

## 2025-03-10 DIAGNOSIS — E66.812 CLASS 2 SEVERE OBESITY DUE TO EXCESS CALORIES WITH SERIOUS COMORBIDITY AND BODY MASS INDEX (BMI) OF 35.0 TO 35.9 IN ADULT: ICD-10-CM

## 2025-03-10 DIAGNOSIS — E78.2 MIXED HYPERLIPIDEMIA: ICD-10-CM

## 2025-03-10 PROCEDURE — 85025 COMPLETE CBC W/AUTO DIFF WBC: CPT | Performed by: PREVENTIVE MEDICINE

## 2025-03-10 NOTE — TELEPHONE ENCOUNTER
Dawson Lomax notified and voiced comprehension and understanding. Patient came in for labs today we had an opening so we seen for a OV

## 2025-03-10 NOTE — PATIENT INSTRUCTIONS
Health Maintenance Due   Topic Date Due    TDAP/TD VACCINES (1 - Tdap) Never done    ZOSTER VACCINE (1 of 2) Never done    Pneumococcal Vaccine 50+ (3 of 3 - PPSV23, PCV20 or PCV21) 01/01/2018    DIABETIC EYE EXAM  04/05/2023    INFLUENZA VACCINE  07/01/2024    COVID-19 Vaccine (4 - 2024-25 season) 09/01/2024    ANNUAL PHYSICAL  01/05/2025    HEMOGLOBIN A1C  02/28/2025    COLORECTAL CANCER SCREENING  03/01/2025    PROSTATE CANCER SCREENING  05/20/2025    12 hour fast for laCheck blood pressure cuff for accuracy and send 10 blood pressures over 2 weeks.  Watch sodium, alcohol and weight bs

## 2025-03-11 ENCOUNTER — RESULTS FOLLOW-UP (OUTPATIENT)
Dept: FAMILY MEDICINE CLINIC | Facility: CLINIC | Age: 65
End: 2025-03-11
Payer: COMMERCIAL

## 2025-03-11 ENCOUNTER — TELEPHONE (OUTPATIENT)
Dept: ONCOLOGY | Facility: CLINIC | Age: 65
End: 2025-03-11

## 2025-03-11 DIAGNOSIS — E11.65 TYPE 2 DIABETES MELLITUS WITH HYPERGLYCEMIA, WITHOUT LONG-TERM CURRENT USE OF INSULIN: Primary | ICD-10-CM

## 2025-03-11 RX ORDER — GLYBURIDE 5 MG/1
10 TABLET ORAL 2 TIMES DAILY
Qty: 120 TABLET | Refills: 3 | Status: SHIPPED | OUTPATIENT
Start: 2025-03-11

## 2025-03-11 RX ORDER — ATORVASTATIN CALCIUM 20 MG/1
20 TABLET, FILM COATED ORAL DAILY
Qty: 90 TABLET | Refills: 0 | Status: SHIPPED | OUTPATIENT
Start: 2025-03-11

## 2025-03-11 NOTE — PROGRESS NOTES
White blood cell count continues to escalate it has been doing that over the last 3 months.  Make sure that you keep your follow-ups with Dr. 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

## 2025-03-11 NOTE — TELEPHONE ENCOUNTER
Received a call from the pt stating that Dr. Teran advised that he follow-up with Dr. Yusuf due to rising WBC count. I advised him that

## 2025-03-11 NOTE — TELEPHONE ENCOUNTER
Dawson Lomax notified and voiced comprehension and understanding.    
White blood cell count continues to escalate it has been doing that over the last 3 months. Make sure that you keep your follow-ups with Dr. 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   
Resident
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
## 2025-04-01 ENCOUNTER — LAB (OUTPATIENT)
Dept: FAMILY MEDICINE CLINIC | Facility: CLINIC | Age: 65
End: 2025-04-01
Payer: COMMERCIAL

## 2025-04-01 DIAGNOSIS — E55.9 VITAMIN D DEFICIENCY: ICD-10-CM

## 2025-04-01 DIAGNOSIS — E53.8 B12 DEFICIENCY: ICD-10-CM

## 2025-04-01 DIAGNOSIS — I10 ESSENTIAL HYPERTENSION: ICD-10-CM

## 2025-04-01 DIAGNOSIS — E11.65 TYPE 2 DIABETES MELLITUS WITH HYPERGLYCEMIA, WITHOUT LONG-TERM CURRENT USE OF INSULIN: ICD-10-CM

## 2025-04-01 DIAGNOSIS — E78.2 MIXED HYPERLIPIDEMIA: ICD-10-CM

## 2025-04-01 DIAGNOSIS — M10.9 GOUT, UNSPECIFIED CAUSE, UNSPECIFIED CHRONICITY, UNSPECIFIED SITE: Chronic | ICD-10-CM

## 2025-04-01 LAB
25(OH)D3 SERPL-MCNC: 17.9 NG/ML (ref 30–100)
ALBUMIN SERPL-MCNC: 4 G/DL (ref 3.5–5.2)
ALBUMIN UR-MCNC: 9.6 MG/DL
ALBUMIN/GLOB SERPL: 1.2 G/DL
ALP SERPL-CCNC: 69 U/L (ref 39–117)
ALT SERPL W P-5'-P-CCNC: 19 U/L (ref 1–41)
ANION GAP SERPL CALCULATED.3IONS-SCNC: 14.5 MMOL/L (ref 5–15)
AST SERPL-CCNC: 17 U/L (ref 1–40)
BILIRUB SERPL-MCNC: 0.7 MG/DL (ref 0–1.2)
BUN SERPL-MCNC: 17 MG/DL (ref 8–23)
BUN/CREAT SERPL: 20.5 (ref 7–25)
CALCIUM SPEC-SCNC: 9.5 MG/DL (ref 8.6–10.5)
CHLORIDE SERPL-SCNC: 99 MMOL/L (ref 98–107)
CHOLEST SERPL-MCNC: 115 MG/DL (ref 0–200)
CO2 SERPL-SCNC: 22.5 MMOL/L (ref 22–29)
CREAT SERPL-MCNC: 0.83 MG/DL (ref 0.76–1.27)
CREAT UR-MCNC: 40.2 MG/DL
EGFRCR SERPLBLD CKD-EPI 2021: 97.7 ML/MIN/1.73
GLOBULIN UR ELPH-MCNC: 3.4 GM/DL
GLUCOSE SERPL-MCNC: 199 MG/DL (ref 65–99)
HBA1C MFR BLD: 13.5 % (ref 4.8–5.6)
HDLC SERPL-MCNC: 29 MG/DL (ref 40–60)
LDLC SERPL CALC-MCNC: 55 MG/DL (ref 0–100)
LDLC/HDLC SERPL: 1.71 {RATIO}
MICROALBUMIN/CREAT UR: 238.8 MG/G (ref 0–29)
POTASSIUM SERPL-SCNC: 4.3 MMOL/L (ref 3.5–5.2)
PROT SERPL-MCNC: 7.4 G/DL (ref 6–8.5)
SODIUM SERPL-SCNC: 136 MMOL/L (ref 136–145)
TRIGL SERPL-MCNC: 182 MG/DL (ref 0–150)
TSH SERPL DL<=0.05 MIU/L-ACNC: 2.17 UIU/ML (ref 0.27–4.2)
URATE SERPL-MCNC: 4.9 MG/DL (ref 3.4–7)
VIT B12 BLD-MCNC: 703 PG/ML (ref 211–946)
VLDLC SERPL-MCNC: 31 MG/DL (ref 5–40)

## 2025-04-01 PROCEDURE — 83036 HEMOGLOBIN GLYCOSYLATED A1C: CPT | Performed by: PREVENTIVE MEDICINE

## 2025-04-01 PROCEDURE — 84550 ASSAY OF BLOOD/URIC ACID: CPT | Performed by: PREVENTIVE MEDICINE

## 2025-04-01 PROCEDURE — 82043 UR ALBUMIN QUANTITATIVE: CPT | Performed by: PREVENTIVE MEDICINE

## 2025-04-01 PROCEDURE — 36415 COLL VENOUS BLD VENIPUNCTURE: CPT

## 2025-04-01 PROCEDURE — 84443 ASSAY THYROID STIM HORMONE: CPT | Performed by: PREVENTIVE MEDICINE

## 2025-04-01 PROCEDURE — 82306 VITAMIN D 25 HYDROXY: CPT | Performed by: PREVENTIVE MEDICINE

## 2025-04-01 PROCEDURE — 80061 LIPID PANEL: CPT | Performed by: PREVENTIVE MEDICINE

## 2025-04-01 PROCEDURE — 82607 VITAMIN B-12: CPT | Performed by: PREVENTIVE MEDICINE

## 2025-04-01 PROCEDURE — 82570 ASSAY OF URINE CREATININE: CPT | Performed by: PREVENTIVE MEDICINE

## 2025-04-01 PROCEDURE — 80053 COMPREHEN METABOLIC PANEL: CPT | Performed by: PREVENTIVE MEDICINE

## 2025-04-04 RX ORDER — SEMAGLUTIDE 1.34 MG/ML
0.25 INJECTION, SOLUTION SUBCUTANEOUS WEEKLY
COMMUNITY
End: 2025-04-04 | Stop reason: RX

## 2025-04-06 RX ORDER — SEMAGLUTIDE 0.68 MG/ML
0.25 INJECTION, SOLUTION SUBCUTANEOUS WEEKLY
Qty: 3 ML | Refills: 0 | OUTPATIENT
Start: 2025-04-06
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
## 2025-04-28 ENCOUNTER — CONSULT (OUTPATIENT)
Dept: ONCOLOGY | Facility: CLINIC | Age: 65
End: 2025-04-28
Payer: COMMERCIAL

## 2025-04-28 ENCOUNTER — LAB (OUTPATIENT)
Dept: LAB | Facility: HOSPITAL | Age: 65
End: 2025-04-28
Payer: COMMERCIAL

## 2025-04-28 VITALS
DIASTOLIC BLOOD PRESSURE: 81 MMHG | TEMPERATURE: 97.9 F | BODY MASS INDEX: 31.66 KG/M2 | WEIGHT: 197 LBS | OXYGEN SATURATION: 92 % | HEIGHT: 66 IN | SYSTOLIC BLOOD PRESSURE: 139 MMHG | RESPIRATION RATE: 20 BRPM | HEART RATE: 77 BPM

## 2025-04-28 DIAGNOSIS — E53.8 B12 DEFICIENCY: ICD-10-CM

## 2025-04-28 DIAGNOSIS — D72.829 LEUKOCYTOSIS, UNSPECIFIED TYPE: Primary | ICD-10-CM

## 2025-04-28 DIAGNOSIS — D72.829 LEUKOCYTOSIS, UNSPECIFIED TYPE: ICD-10-CM

## 2025-04-28 LAB
BASOPHILS # BLD AUTO: 0.04 10*3/MM3 (ref 0–0.2)
BASOPHILS NFR BLD AUTO: 0.2 % (ref 0–1.5)
DEPRECATED RDW RBC AUTO: 44.9 FL (ref 37–54)
EOSINOPHIL # BLD AUTO: 0.17 10*3/MM3 (ref 0–0.4)
EOSINOPHIL NFR BLD AUTO: 1 % (ref 0.3–6.2)
ERYTHROCYTE [DISTWIDTH] IN BLOOD BY AUTOMATED COUNT: 13.4 % (ref 12.3–15.4)
HCT VFR BLD AUTO: 47.1 % (ref 37.5–51)
HGB BLD-MCNC: 15.8 G/DL (ref 13–17.7)
LYMPHOCYTES # BLD AUTO: 5.23 10*3/MM3 (ref 0.7–3.1)
LYMPHOCYTES NFR BLD AUTO: 29.5 % (ref 19.6–45.3)
MCH RBC QN AUTO: 31.8 PG (ref 26.6–33)
MCHC RBC AUTO-ENTMCNC: 33.5 G/DL (ref 31.5–35.7)
MCV RBC AUTO: 94.8 FL (ref 79–97)
MONOCYTES # BLD AUTO: 2.12 10*3/MM3 (ref 0.1–0.9)
MONOCYTES NFR BLD AUTO: 12 % (ref 5–12)
NEUTROPHILS NFR BLD AUTO: 10.16 10*3/MM3 (ref 1.7–7)
NEUTROPHILS NFR BLD AUTO: 57.3 % (ref 42.7–76)
PLATELET # BLD AUTO: 478 10*3/MM3 (ref 140–450)
PMV BLD AUTO: 8.9 FL (ref 6–12)
RBC # BLD AUTO: 4.97 10*6/MM3 (ref 4.14–5.8)
WBC NRBC COR # BLD AUTO: 17.72 10*3/MM3 (ref 3.4–10.8)

## 2025-04-28 PROCEDURE — 85025 COMPLETE CBC W/AUTO DIFF WBC: CPT

## 2025-04-28 PROCEDURE — 99214 OFFICE O/P EST MOD 30 MIN: CPT | Performed by: INTERNAL MEDICINE

## 2025-04-28 PROCEDURE — 36415 COLL VENOUS BLD VENIPUNCTURE: CPT

## 2025-04-28 RX ORDER — INSULIN DEGLUDEC 100 U/ML
INJECTION, SOLUTION SUBCUTANEOUS
COMMUNITY
Start: 2025-04-24

## 2025-05-20 RX ORDER — DILTIAZEM HYDROCHLORIDE 360 MG/1
360 CAPSULE, EXTENDED RELEASE ORAL DAILY
Qty: 90 CAPSULE | Refills: 0 | Status: SHIPPED | OUTPATIENT
Start: 2025-05-20

## 2025-05-23 ENCOUNTER — OFFICE VISIT (OUTPATIENT)
Dept: CARDIOLOGY | Facility: CLINIC | Age: 65
End: 2025-05-23
Payer: COMMERCIAL

## 2025-05-23 VITALS
HEART RATE: 68 BPM | OXYGEN SATURATION: 96 % | DIASTOLIC BLOOD PRESSURE: 77 MMHG | HEIGHT: 67 IN | BODY MASS INDEX: 32.33 KG/M2 | SYSTOLIC BLOOD PRESSURE: 151 MMHG | RESPIRATION RATE: 16 BRPM | WEIGHT: 206 LBS

## 2025-05-23 DIAGNOSIS — I25.10 CORONARY ARTERY DISEASE INVOLVING NATIVE CORONARY ARTERY OF NATIVE HEART WITHOUT ANGINA PECTORIS: Primary | ICD-10-CM

## 2025-05-23 DIAGNOSIS — E78.2 MIXED HYPERLIPIDEMIA: ICD-10-CM

## 2025-05-23 DIAGNOSIS — I10 ESSENTIAL HYPERTENSION: ICD-10-CM

## 2025-05-23 PROCEDURE — 99213 OFFICE O/P EST LOW 20 MIN: CPT | Performed by: NURSE PRACTITIONER

## 2025-05-23 PROCEDURE — 93000 ELECTROCARDIOGRAM COMPLETE: CPT | Performed by: NURSE PRACTITIONER
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
## 2025-06-02 ENCOUNTER — HOSPITAL ENCOUNTER (EMERGENCY)
Facility: HOSPITAL | Age: 65
Discharge: HOME | End: 2025-06-03
Admitting: EMERGENCY MEDICINE
Payer: COMMERCIAL

## 2025-06-02 ENCOUNTER — APPOINTMENT (OUTPATIENT)
Dept: GENERAL RADIOLOGY | Facility: HOSPITAL | Age: 65
End: 2025-06-02
Payer: COMMERCIAL

## 2025-06-02 VITALS
RESPIRATION RATE: 22 BRPM | HEIGHT: 67 IN | SYSTOLIC BLOOD PRESSURE: 149 MMHG | OXYGEN SATURATION: 93 % | WEIGHT: 201.06 LBS | BODY MASS INDEX: 31.56 KG/M2 | HEART RATE: 76 BPM | TEMPERATURE: 98.1 F | DIASTOLIC BLOOD PRESSURE: 73 MMHG

## 2025-06-02 DIAGNOSIS — S22.41XA CLOSED FRACTURE OF MULTIPLE RIBS OF RIGHT SIDE, INITIAL ENCOUNTER: ICD-10-CM
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
## 2025-06-02 DIAGNOSIS — S70.01XA CONTUSION OF RIGHT HIP, INITIAL ENCOUNTER: ICD-10-CM

## 2025-06-02 DIAGNOSIS — S52.501A CLOSED FRACTURE OF DISTAL END OF RIGHT RADIUS, UNSPECIFIED FRACTURE MORPHOLOGY, INITIAL ENCOUNTER: ICD-10-CM

## 2025-06-02 DIAGNOSIS — S62.114A NONDISPLACED FRACTURE OF TRIQUETRUM (CUNEIFORM) BONE, RIGHT WRIST, INITIAL ENCOUNTER FOR CLOSED FRACTURE: ICD-10-CM

## 2025-06-02 PROCEDURE — 25010000002 MORPHINE PER 10 MG: Performed by: NURSE PRACTITIONER

## 2025-06-02 PROCEDURE — 71101 X-RAY EXAM UNILAT RIBS/CHEST: CPT

## 2025-06-02 PROCEDURE — 63710000001 ONDANSETRON ODT 4 MG TABLET DISPERSIBLE: Performed by: NURSE PRACTITIONER

## 2025-06-02 PROCEDURE — 96372 THER/PROPH/DIAG INJ SC/IM: CPT

## 2025-06-02 PROCEDURE — 73502 X-RAY EXAM HIP UNI 2-3 VIEWS: CPT

## 2025-06-02 PROCEDURE — 99283 EMERGENCY DEPT VISIT LOW MDM: CPT

## 2025-06-02 PROCEDURE — 73110 X-RAY EXAM OF WRIST: CPT

## 2025-06-02 RX ORDER — OXYCODONE HYDROCHLORIDE 5 MG/1
5 TABLET ORAL ONCE
Refills: 0 | Status: COMPLETED | OUTPATIENT
Start: 2025-06-02 | End: 2025-06-02

## 2025-06-02 RX ORDER — OXYCODONE AND ACETAMINOPHEN 5; 325 MG/1; MG/1
1 TABLET ORAL EVERY 6 HOURS PRN
Qty: 20 TABLET | Refills: 0 | Status: SHIPPED | OUTPATIENT
Start: 2025-06-02

## 2025-06-02 RX ORDER — ONDANSETRON 4 MG/1
4 TABLET, ORALLY DISINTEGRATING ORAL ONCE
Status: COMPLETED | OUTPATIENT
Start: 2025-06-02 | End: 2025-06-02

## 2025-06-02 RX ADMIN — OXYCODONE 5 MG: 5 TABLET ORAL at 23:54

## 2025-06-02 RX ADMIN — ONDANSETRON 4 MG: 4 TABLET, ORALLY DISINTEGRATING ORAL at 22:32

## 2025-06-02 RX ADMIN — MORPHINE SULFATE 4 MG: 4 INJECTION, SOLUTION INTRAMUSCULAR; INTRAVENOUS at 22:32

## 2025-06-03 ENCOUNTER — TELEPHONE (OUTPATIENT)
Dept: FAMILY MEDICINE CLINIC | Facility: CLINIC | Age: 65
End: 2025-06-03
Payer: COMMERCIAL

## 2025-06-03 NOTE — TELEPHONE ENCOUNTER
Patient stated he is really in pain but he was given a referral to Ortho and he will follow up on it.
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
## 2025-06-03 NOTE — DISCHARGE INSTRUCTIONS
Medications as directed and follow  precautions and warnings regarding any prescriptions.  Elevate your arm use ice 20 as a time several times a day.  Follow-up with orthopedics call tomorrow for an appointment referral was placed.  Cough and deep breathing exercises..  Return for any new or worsening symptoms

## 2025-06-09 PROBLEM — S52.501A CLOSED FRACTURE OF DISTAL END OF RIGHT RADIUS: Status: ACTIVE | Noted: 2025-06-09

## 2025-06-09 NOTE — DISCHARGE INSTRUCTIONS
Orthopaedic & Hand Surgery  Discharge Instructions  Dr. Sy Negrete  [phone]    INCISION CARE  The postoperative dressings should be left in place until your follow-up appointment.  You will receive instructions at this appointment on whether your injury requires continued immobilization.  Your surgeon will instruct you regarding suture or staple removal. In general, this happens at the 1-2-week postoperative appointment.  Once postoperative splinting is discontinued, new dry dressings can then be placed around the wound and held in place with an Ace wrap.   Dressings should be changed at least daily or if visibly soiled.  Wash your hands prior to dressing changes  No creams or ointments to the incision until 3 weeks post op.   You may remove dressing once the incision is completely free of drainage.  Do not touch or pick at the incision  Check incision every day and notify surgeon immediately if any of the following signs or symptoms are seen:  Increase in redness  Increase in swelling around the incision and of the entire extremity  Increase in pain  NEW drainage or oozing from the incision  Pulling apart of the edges of the incision  Increase in overall body temperature (greater than 100.4°F)    ACTIVITIES  Exercises:  Physical therapy may or may not be needed after surgery. Once some healing has occurred, it will be possible to start therapy if you wish. However, most patients get their function back fairly well after surgery without formal therapy.  Activities of Daily Living:   Showering may begin immediately if the postoperative dressing can be protected. The dressing/splint and incision cannot get wet after surgery.   No tub baths, hot tubs, or swimming pools for 4 weeks.  May shower and let water run over the incision once the sutures are removed if there is no drainage and the wound is well healing. A new dry dressing can be applied after showering.       Restrictions  Weight: Do not put  weight on the injured arm until instructed to do so. Your surgeon will discuss with restrictions in terms of activities allowed. In general, anything more strenuous than holding a pen or piece of paper should be avoided, the other hand should do the vast majority of the work until the injured side had healed enough that it is not painful.  Driving: Many patients have questions about when it is safe to return to driving. The answer is that this is extremely variable. It depends on how quickly you heal. Until you can safely navigate the steering wheel, and are off of all narcotics, driving is not permitted. Your surgeon cannot “clear” you to return to driving, only you can make the decision when you feel it is safe.     Pain Control  Ice:  Ice is an excellent pain reliever. This can be used regardless of whether or not you are taking pain medication.  Apply an ice pack to the surgical area for 20 minutes at a time, removing it for at least an hour to prevent frostbite.  You should keep a towel between any dressings on the ice pack to prevent them from getting damp and from developing frostbite on the operative site.  Elevation:  Elevation is another easy way to control pain after surgery. Whenever possible, keep the operative limb elevated above the level of your heart to reduce swelling.  Acetaminophen (Tylenol):  CLASSIFICATION: A non-narcotic medication that is available without a prescription.  Acetaminophen controls pain but does not affect swelling or inflammation.  DRIVING: Acetaminophen will not impair your ability to drive. It is safe to drive while taking if your physical condition does not limit you.  POTENTIAL SIDE EFFECTS: nausea, stomach upset, liver failure if taken in large doses.  Interactions: Some narcotic medications contain acetaminophen. If you have a narcotic prescription, make sure to cut back on the acetaminophen if you are taking the narcotic. You should never take more 3000 mg of  acetaminophen in one 24-hour period.  DOSAGE:  Following surgery, you may take two regular strength (325 mg) tabs to control pain every 6 hours. This can be taken with NSAIDs (see below) or alternating the two.  After the initial surgical pain begins to resolve, you may begin to decrease the pain medication. By the end of a few weeks, you should be off of pain medications.  NSAIDS: This includes aspirin, ibuprofen, naproxen, Motrin, Aleve, Mobic, Celebrex  CLASSIFICATION: These are non-narcotic medications that are available without a prescription.  They are particularly effective at reducing swelling and inflammation  DRIVING: These medications will not impair your ability to drive. It is safe to drive while taking these medications if your physical condition does not limit you.  POTENTIAL SIDE EFFECTS: nausea, stomach upset, ulcer, gastric bleeding, kidney failure.  DOSAGE:  Following surgery, you may take ibuprofen (Motrin) 600 mg to control pain every 6 hours with food. It helps to take it scheduled (around the clock) to allow it to help reduce swelling.  After the initial surgical pain begins to resolve, you may begin to decrease the pain medication. By the end of a few weeks, you should be off of pain medications.  Narcotic Pain Medications: Utilized after surgery. This is some general information about these medications.  CLASSIFICATION: Prescription pain medications are called Opioids and are narcotics  LEGALITIES: It is illegal to share narcotics with others  DRIVING: it is illegal to drive while under the influence of narcotics. Doing so is a DUI.  POTENTIAL SIDE EFFECTS: nausea, vomiting, itching, dizziness, drowsiness, dry mouth, constipation, and difficulty urinating.  POTENTIAL ADVERSE EFFECTS:  Opioid tolerance can develop with use of pain medications and this simply means that it requires more and more of the medication to control pain. However, this is seen more in patients that use opioids for  longer periods of time.  Opioid dependence can develop with use of Opioids. People with opioid dependence will experience withdrawal symptoms upon cessation of the medication.  Opioid addiction can develop with use of Opioids. The incidence of this is very unlikely in patients who take the medications as ordered and stop the medications as instructed.  Opioid overdose can be dangerous, but is unlikely when the medication is taken as ordered and stopped when ordered. It is important not to mix opioids with alcohol as this can lead to over sedation and respiratory difficulty.  DOSAGE:  After the initial surgical pain begins to resolve, you should begin to decrease the pain medication dose and frequency. By the end of a few weeks, you should be off of narcotic pain medications.  Refills will not be given by the office during evening hours, on weekends, or after 6 weeks post-op. You are responsible for weaning off of pain medication.  To seek refills on pain medications during the initial 6-week post-operative period, you must call the office to request the refill. The office will then notify you when to  the prescription. DO NOT wait until you are out of the medication to request a refill. Prescriptions will not be filled over the weekend and it may take a couple days for the prescription to be available. Someone will have to pick the prescription in person at the office.    Other Medications  Anticoagulants: After upper extremity surgery most patients do not require an anticoagulant unless you have another injury that will be keeping you from mobilizing.  If you were already taking an anticoagulant (commonly Aspirin, Coumadin, or Plavix) you will likely be resuming your normal dose postoperatively and will be continuing that medication at the discretion of the prescribing physician.  Stool Softeners: You will be at greater risk of constipation after surgery due to being less mobile and the narcotic pain  medications.  Take stool softeners as needed. Over the counter Colace 100 mg 1-2 capsules twice daily can be taken.  If stools become too loose or too frequent, please decreases the dosage or stop the stool softener.  If constipation occurs despite use of stool softeners, you are to continue the stool softeners and add a laxative (Milk of Magnesia 1 ounce daily as needed)  Drink plenty of fluids, and eat fruits and vegetables during your recovery time. Getting up and mobilizing will help the bowels to recover their regular function, as will weaning off of all narcotics when the pain becomes tolerable.    FOLLOW-UP VISITS  You will need to follow up in the office with your surgeon in 1-2 weeks, or as instructed elsewhere in your discharge paperwork. Please call this number [phone] to schedule this appointment if one has not already been made.   If you have any concerns or suspected complications prior to your follow up visit, please call the office. Do not wait until your appointment time if you suspect complications. These will need to be addressed in the office promptly.      Sy Negrete MD, PhD  Orthopaedic Surgery  [facility]
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
## 2025-06-10 ENCOUNTER — TELEPHONE (OUTPATIENT)
Dept: CARDIOLOGY | Facility: CLINIC | Age: 65
End: 2025-06-10
Payer: COMMERCIAL

## 2025-06-10 ENCOUNTER — LAB (OUTPATIENT)
Dept: LAB | Facility: HOSPITAL | Age: 65
End: 2025-06-10
Payer: COMMERCIAL

## 2025-06-10 ENCOUNTER — HOSPITAL ENCOUNTER (OUTPATIENT)
Dept: GENERAL RADIOLOGY | Facility: HOSPITAL | Age: 65
Discharge: HOME | End: 2025-06-10
Payer: COMMERCIAL

## 2025-06-10 ENCOUNTER — HOSPITAL ENCOUNTER (OUTPATIENT)
Dept: CARDIOLOGY | Facility: HOSPITAL | Age: 65
Discharge: HOME | End: 2025-06-10
Payer: COMMERCIAL

## 2025-06-10 LAB
ABO GROUP BLD: NORMAL
ANION GAP SERPL CALCULATED.3IONS-SCNC: 10.1 MMOL/L (ref 5–15)
BASOPHILS # BLD AUTO: 0.09 10*3/MM3 (ref 0–0.2)
BASOPHILS NFR BLD AUTO: 0.7 % (ref 0–1.5)
BLD GP AB SCN SERPL QL: NEGATIVE
BUN SERPL-MCNC: 14 MG/DL (ref 8–23)
BUN/CREAT SERPL: 20.6 (ref 7–25)
CALCIUM SPEC-SCNC: 9.3 MG/DL (ref 8.6–10.5)
CHLORIDE SERPL-SCNC: 103 MMOL/L (ref 98–107)
CO2 SERPL-SCNC: 23.9 MMOL/L (ref 22–29)
CREAT SERPL-MCNC: 0.68 MG/DL (ref 0.76–1.27)
DEPRECATED RDW RBC AUTO: 47.8 FL (ref 37–54)
EGFRCR SERPLBLD CKD-EPI 2021: 103.8 ML/MIN/1.73
EOSINOPHIL # BLD AUTO: 0.14 10*3/MM3 (ref 0–0.4)
EOSINOPHIL NFR BLD AUTO: 1.1 % (ref 0.3–6.2)
ERYTHROCYTE [DISTWIDTH] IN BLOOD BY AUTOMATED COUNT: 13.3 % (ref 12.3–15.4)
GLUCOSE SERPL-MCNC: 285 MG/DL (ref 65–99)
HBA1C MFR BLD: 10.9 % (ref 4.8–5.6)
HCT VFR BLD AUTO: 45.2 % (ref 37.5–51)
HGB BLD-MCNC: 14.2 G/DL (ref 13–17.7)
IMM GRANULOCYTES # BLD AUTO: 0.05 10*3/MM3 (ref 0–0.05)
IMM GRANULOCYTES NFR BLD AUTO: 0.4 % (ref 0–0.5)
LYMPHOCYTES # BLD AUTO: 4.72 10*3/MM3 (ref 0.7–3.1)
LYMPHOCYTES NFR BLD AUTO: 38.1 % (ref 19.6–45.3)
MCH RBC QN AUTO: 30.8 PG (ref 26.6–33)
MCHC RBC AUTO-ENTMCNC: 31.4 G/DL (ref 31.5–35.7)
MCV RBC AUTO: 98 FL (ref 79–97)
MONOCYTES # BLD AUTO: 1.82 10*3/MM3 (ref 0.1–0.9)
MONOCYTES NFR BLD AUTO: 14.7 % (ref 5–12)
NEUTROPHILS NFR BLD AUTO: 45 % (ref 42.7–76)
NEUTROPHILS NFR BLD AUTO: 5.57 10*3/MM3 (ref 1.7–7)
NRBC BLD AUTO-RTO: 0 /100 WBC (ref 0–0.2)
PLATELET # BLD AUTO: 452 10*3/MM3 (ref 140–450)
PMV BLD AUTO: 9.2 FL (ref 6–12)
POTASSIUM SERPL-SCNC: 4.3 MMOL/L (ref 3.5–5.2)
QT INTERVAL: 394 MS
QTC INTERVAL: 441 MS
RBC # BLD AUTO: 4.61 10*6/MM3 (ref 4.14–5.8)
RH BLD: POSITIVE
SODIUM SERPL-SCNC: 137 MMOL/L (ref 136–145)
T&S EXPIRATION DATE: NORMAL
WBC NRBC COR # BLD AUTO: 12.39 10*3/MM3 (ref 3.4–10.8)

## 2025-06-10 PROCEDURE — 86901 BLOOD TYPING SEROLOGIC RH(D): CPT

## 2025-06-10 PROCEDURE — 36415 COLL VENOUS BLD VENIPUNCTURE: CPT

## 2025-06-10 PROCEDURE — 83036 HEMOGLOBIN GLYCOSYLATED A1C: CPT

## 2025-06-10 PROCEDURE — 71046 X-RAY EXAM CHEST 2 VIEWS: CPT

## 2025-06-10 PROCEDURE — 80048 BASIC METABOLIC PNL TOTAL CA: CPT

## 2025-06-10 PROCEDURE — 86850 RBC ANTIBODY SCREEN: CPT

## 2025-06-10 PROCEDURE — 86900 BLOOD TYPING SEROLOGIC ABO: CPT

## 2025-06-10 PROCEDURE — 93005 ELECTROCARDIOGRAM TRACING: CPT | Performed by: STUDENT IN AN ORGANIZED HEALTH CARE EDUCATION/TRAINING PROGRAM

## 2025-06-10 PROCEDURE — 85025 COMPLETE CBC W/AUTO DIFF WBC: CPT

## 2025-06-10 NOTE — PAT
Sent Dr Negrete a message regarding pts abn lab results for surgery on 6/12, ok to cont with surgery per mai

## 2025-06-11 ENCOUNTER — ANESTHESIA EVENT (OUTPATIENT)
Dept: PERIOP | Facility: HOSPITAL | Age: 65
End: 2025-06-11
Payer: COMMERCIAL
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
## 2025-06-12 ENCOUNTER — APPOINTMENT (OUTPATIENT)
Dept: GENERAL RADIOLOGY | Facility: HOSPITAL | Age: 65
End: 2025-06-12
Payer: COMMERCIAL

## 2025-06-12 ENCOUNTER — ANESTHESIA EVENT CONVERTED (OUTPATIENT)
Dept: ANESTHESIOLOGY | Facility: HOSPITAL | Age: 65
End: 2025-06-12
Payer: COMMERCIAL

## 2025-06-12 ENCOUNTER — HOSPITAL ENCOUNTER (OUTPATIENT)
Facility: HOSPITAL | Age: 65
Setting detail: HOSPITAL OUTPATIENT SURGERY
Discharge: HOME | End: 2025-06-12
Attending: STUDENT IN AN ORGANIZED HEALTH CARE EDUCATION/TRAINING PROGRAM | Admitting: STUDENT IN AN ORGANIZED HEALTH CARE EDUCATION/TRAINING PROGRAM
Payer: COMMERCIAL

## 2025-06-12 ENCOUNTER — ANESTHESIA (OUTPATIENT)
Dept: PERIOP | Facility: HOSPITAL | Age: 65
End: 2025-06-12
Payer: COMMERCIAL

## 2025-06-12 VITALS
DIASTOLIC BLOOD PRESSURE: 67 MMHG | BODY MASS INDEX: 30.95 KG/M2 | RESPIRATION RATE: 20 BRPM | TEMPERATURE: 97.4 F | OXYGEN SATURATION: 94 % | HEIGHT: 66 IN | SYSTOLIC BLOOD PRESSURE: 129 MMHG | WEIGHT: 192.6 LBS | HEART RATE: 75 BPM

## 2025-06-12 DIAGNOSIS — S52.571A OTHER CLOSED INTRA-ARTICULAR FRACTURE OF DISTAL END OF RIGHT RADIUS, INITIAL ENCOUNTER: Primary | ICD-10-CM

## 2025-06-12 LAB
GLUCOSE BLDC GLUCOMTR-MCNC: 251 MG/DL (ref 70–105)
GLUCOSE BLDC GLUCOMTR-MCNC: 304 MG/DL (ref 70–105)
GLUCOSE BLDC GLUCOMTR-MCNC: 317 MG/DL (ref 70–105)
GLUCOSE BLDC GLUCOMTR-MCNC: 322 MG/DL (ref 70–105)

## 2025-06-12 PROCEDURE — 73100 X-RAY EXAM OF WRIST: CPT

## 2025-06-12 PROCEDURE — 25010000002 PROPOFOL 10 MG/ML EMULSION

## 2025-06-12 PROCEDURE — 25810000003 SODIUM CHLORIDE 0.9 % SOLUTION

## 2025-06-12 PROCEDURE — 76000 FLUOROSCOPY <1 HR PHYS/QHP: CPT

## 2025-06-12 PROCEDURE — 25010000002 FENTANYL CITRATE (PF) 100 MCG/2ML SOLUTION

## 2025-06-12 PROCEDURE — 25010000002 ROPIVACAINE PER 1 MG: Performed by: ANESTHESIOLOGY

## 2025-06-12 PROCEDURE — C1713 ANCHOR/SCREW BN/BN,TIS/BN: HCPCS | Performed by: STUDENT IN AN ORGANIZED HEALTH CARE EDUCATION/TRAINING PROGRAM

## 2025-06-12 PROCEDURE — 25810000003 LACTATED RINGERS PER 1000 ML: Performed by: STUDENT IN AN ORGANIZED HEALTH CARE EDUCATION/TRAINING PROGRAM

## 2025-06-12 PROCEDURE — 25010000002 DEXAMETHASONE PER 1 MG: Performed by: ANESTHESIOLOGY

## 2025-06-12 PROCEDURE — 25010000002 PROPOFOL 1000 MG/100ML EMULSION

## 2025-06-12 PROCEDURE — 25010000002 LIDOCAINE 1% - EPINEPHRINE 1:100000 1 %-1:100000 SOLUTION: Performed by: STUDENT IN AN ORGANIZED HEALTH CARE EDUCATION/TRAINING PROGRAM

## 2025-06-12 PROCEDURE — 25010000002 MIDAZOLAM PER 1 MG: Performed by: ANESTHESIOLOGY

## 2025-06-12 PROCEDURE — 25010000002 MEPIVACAINE PF 1 % SOLUTION: Performed by: ANESTHESIOLOGY

## 2025-06-12 PROCEDURE — 63710000001 INSULIN REGULAR HUMAN PER 5 UNITS: Performed by: ANESTHESIOLOGY

## 2025-06-12 PROCEDURE — 25010000002 CEFAZOLIN PER 500 MG: Performed by: STUDENT IN AN ORGANIZED HEALTH CARE EDUCATION/TRAINING PROGRAM

## 2025-06-12 PROCEDURE — 25010000002 LIDOCAINE PF 2% 2 % SOLUTION

## 2025-06-12 PROCEDURE — 82948 REAGENT STRIP/BLOOD GLUCOSE: CPT

## 2025-06-12 RX ORDER — DEXAMETHASONE SODIUM PHOSPHATE 4 MG/ML
INJECTION, SOLUTION INTRA-ARTICULAR; INTRALESIONAL; INTRAMUSCULAR; INTRAVENOUS; SOFT TISSUE
Status: COMPLETED | OUTPATIENT
Start: 2025-06-12 | End: 2025-06-12

## 2025-06-12 RX ORDER — LIDOCAINE HYDROCHLORIDE AND EPINEPHRINE 10; 10 MG/ML; UG/ML
INJECTION, SOLUTION INFILTRATION; PERINEURAL AS NEEDED
Status: DISCONTINUED | OUTPATIENT
Start: 2025-06-12 | End: 2025-06-12 | Stop reason: HOSPADM

## 2025-06-12 RX ORDER — PROPOFOL 10 MG/ML
INJECTION, EMULSION INTRAVENOUS AS NEEDED
Status: DISCONTINUED | OUTPATIENT
Start: 2025-06-12 | End: 2025-06-12 | Stop reason: SURG

## 2025-06-12 RX ORDER — LIDOCAINE HYDROCHLORIDE 20 MG/ML
INJECTION, SOLUTION EPIDURAL; INFILTRATION; INTRACAUDAL; PERINEURAL AS NEEDED
Status: DISCONTINUED | OUTPATIENT
Start: 2025-06-12 | End: 2025-06-12 | Stop reason: SURG

## 2025-06-12 RX ORDER — SODIUM CHLORIDE, SODIUM LACTATE, POTASSIUM CHLORIDE, CALCIUM CHLORIDE 600; 310; 30; 20 MG/100ML; MG/100ML; MG/100ML; MG/100ML
1000 INJECTION, SOLUTION INTRAVENOUS ONCE
Status: COMPLETED | OUTPATIENT
Start: 2025-06-12 | End: 2025-06-12

## 2025-06-12 RX ORDER — IBUPROFEN 400 MG/1
400-800 TABLET, FILM COATED ORAL EVERY 8 HOURS PRN
Qty: 180 TABLET | Refills: 2 | Status: SHIPPED | OUTPATIENT
Start: 2025-06-12

## 2025-06-12 RX ORDER — LIDOCAINE HYDROCHLORIDE 10 MG/ML
0.5 INJECTION, SOLUTION EPIDURAL; INFILTRATION; INTRACAUDAL; PERINEURAL ONCE AS NEEDED
Status: DISCONTINUED | OUTPATIENT
Start: 2025-06-12 | End: 2025-06-12 | Stop reason: HOSPADM

## 2025-06-12 RX ORDER — SODIUM CHLORIDE 0.9 % (FLUSH) 0.9 %
10 SYRINGE (ML) INJECTION AS NEEDED
Status: DISCONTINUED | OUTPATIENT
Start: 2025-06-12 | End: 2025-06-12 | Stop reason: HOSPADM

## 2025-06-12 RX ORDER — FENTANYL CITRATE 50 UG/ML
INJECTION, SOLUTION INTRAMUSCULAR; INTRAVENOUS AS NEEDED
Status: DISCONTINUED | OUTPATIENT
Start: 2025-06-12 | End: 2025-06-12 | Stop reason: SURG

## 2025-06-12 RX ORDER — PROPOFOL 10 MG/ML
VIAL (ML) INTRAVENOUS CONTINUOUS PRN
Status: DISCONTINUED | OUTPATIENT
Start: 2025-06-12 | End: 2025-06-12 | Stop reason: SURG

## 2025-06-12 RX ORDER — SODIUM CHLORIDE 9 MG/ML
INJECTION, SOLUTION INTRAVENOUS CONTINUOUS PRN
Status: DISCONTINUED | OUTPATIENT
Start: 2025-06-12 | End: 2025-06-12 | Stop reason: SURG

## 2025-06-12 RX ORDER — ROPIVACAINE HYDROCHLORIDE 5 MG/ML
INJECTION, SOLUTION EPIDURAL; INFILTRATION; PERINEURAL
Status: COMPLETED | OUTPATIENT
Start: 2025-06-12 | End: 2025-06-12

## 2025-06-12 RX ORDER — MIDAZOLAM HYDROCHLORIDE 1 MG/ML
INJECTION, SOLUTION INTRAMUSCULAR; INTRAVENOUS
Status: COMPLETED | OUTPATIENT
Start: 2025-06-12 | End: 2025-06-12

## 2025-06-12 RX ORDER — HYDROCODONE BITARTRATE AND ACETAMINOPHEN 10; 325 MG/1; MG/1
1 TABLET ORAL EVERY 6 HOURS PRN
Qty: 30 TABLET | Refills: 0 | Status: SHIPPED | OUTPATIENT
Start: 2025-06-12

## 2025-06-12 RX ADMIN — MIDAZOLAM 2 MG: 1 INJECTION INTRAMUSCULAR; INTRAVENOUS at 12:05

## 2025-06-12 RX ADMIN — FENTANYL CITRATE 25 MCG: 50 INJECTION, SOLUTION INTRAMUSCULAR; INTRAVENOUS at 14:40

## 2025-06-12 RX ADMIN — DEXAMETHASONE SODIUM PHOSPHATE 4 MG: 4 INJECTION, SOLUTION INTRAMUSCULAR; INTRAVENOUS at 12:10

## 2025-06-12 RX ADMIN — SODIUM CHLORIDE, POTASSIUM CHLORIDE, SODIUM LACTATE AND CALCIUM CHLORIDE 1000 ML: 600; 310; 30; 20 INJECTION, SOLUTION INTRAVENOUS at 11:15

## 2025-06-12 RX ADMIN — FENTANYL CITRATE 50 MCG: 50 INJECTION, SOLUTION INTRAMUSCULAR; INTRAVENOUS at 13:25

## 2025-06-12 RX ADMIN — LIDOCAINE HYDROCHLORIDE 100 MG: 20 INJECTION, SOLUTION EPIDURAL; INFILTRATION; INTRACAUDAL; PERINEURAL at 13:25

## 2025-06-12 RX ADMIN — CEFAZOLIN 2 G: 2 INJECTION, POWDER, FOR SOLUTION INTRAMUSCULAR; INTRAVENOUS at 13:05

## 2025-06-12 RX ADMIN — PROPOFOL 100 MG: 10 INJECTION, EMULSION INTRAVENOUS at 13:27

## 2025-06-12 RX ADMIN — INSULIN HUMAN 7 UNITS: 100 INJECTION, SOLUTION PARENTERAL at 11:15

## 2025-06-12 RX ADMIN — MEPIVACAINE HYDROCHLORIDE 15 ML: 10 INJECTION, SOLUTION EPIDURAL; INFILTRATION at 12:10

## 2025-06-12 RX ADMIN — SODIUM CHLORIDE: 9 INJECTION, SOLUTION INTRAVENOUS at 13:12

## 2025-06-12 RX ADMIN — PROPOFOL 150 MCG/KG/MIN: 10 INJECTION, EMULSION INTRAVENOUS at 13:26

## 2025-06-12 RX ADMIN — ROPIVACAINE HYDROCHLORIDE 15 ML: 5 INJECTION EPIDURAL; INFILTRATION; PERINEURAL at 12:10

## 2025-06-12 RX ADMIN — FENTANYL CITRATE 25 MCG: 50 INJECTION, SOLUTION INTRAMUSCULAR; INTRAVENOUS at 14:26

## 2025-06-12 NOTE — ANESTHESIA PROCEDURE NOTES
Peripheral Block      Patient location during procedure: pre-op  Start time: 6/12/2025 12:05 PM  Stop time: 6/12/2025 12:10 PM  Reason for block: at surgeon's request and post-op pain management  Performed by  Anesthesiologist: Jakub Del Valle MD  Preanesthetic Checklist  Completed: patient identified, IV checked, site marked, risks and benefits discussed, surgical consent, monitors and equipment checked, pre-op evaluation and timeout performed  Prep:  Pt Position: supine  Sterile barriers:cap, gloves, sterile barriers, partial drape, mask and washed/disinfected hands  Prep: ChloraPrep  Patient monitoring: blood pressure monitoring, continuous pulse oximetry and EKG  Procedure    Sedation: yes  Performed under: local infiltration  Guidance:ultrasound guided    ULTRASOUND INTERPRETATION.  Using ultrasound guidance a 20 G gauge needle was placed in close proximity to the brachial plexus nerve, at which point, under ultrasound guidance anesthetic was injected in the area of the nerve and spread of the anesthesia was seen on ultrasound in close proximity thereto.  There were no abnormalities seen on ultrasound; a digital image was taken; and the patient tolerated the procedure with no complications. Images:still images obtained, printed/placed on chart    Laterality:right  Block Type:supraclavicular  Injection Technique:single-shot  Needle Type:echogenic  Needle Gauge:20 G  Resistance on Injection: none  Sedation medications used: midazolam (VERSED) injection - Intravenous   2 mg - 6/12/2025 12:05:00 PM  Medications Used: dexamethasone (DECADRON) injection - Injection   4 mg - 6/12/2025 12:10:00 PM  ropivacaine (NAROPIN) 0.5 % injection - Perineural   15 mL - 6/12/2025 12:10:00 PM  mepivacaine PF (CARBOCAINE) 1 % injection - Perineural   15 mL - 6/12/2025 12:10:00 PM      Post Assessment  Injection Assessment: negative aspiration for heme, no paresthesia on injection and incremental injection  Patient  Tolerance:comfortable throughout block  Complications:no  Additional Notes  PT SEDATED YET AWAKE WITH MEANINGFUL CONVERSATION THROUGHOUT NO PAIN OR PARESTHESIA WITH NEEDLE PLACEMENT/INJECTION US VERIFICATION NEEDLE LOCATION MEDICATION DISBURSEMENT. US GUIDED X1 WITH EASE  Performed by: Jakub Del Valle MD

## 2025-06-12 NOTE — ANESTHESIA POSTPROCEDURE EVALUATION
Patient: Dawson Lomax    Procedure Summary       Date: 06/12/25 Room / Location: [facility] OR 07 / [facility] MAIN OR    Anesthesia Start: 1311 Anesthesia Stop: 1509    Procedure: OPEN REDUCTION INTERNAL FIXATION DISTAL RADIUS (Right: Wrist) Diagnosis:       Closed fracture of distal end of radius      (Closed fracture of distal end of radius [S52.509A])    Surgeons: Sy Negrete MD Provider: Sherman Marsh CRNA    Anesthesia Type: regional, MAC ASA Status: 3            Anesthesia Type: regional, MAC    Vitals  Vitals Value Taken Time   /67 06/12/25 15:36   Temp 97.4 °F (36.3 °C) 06/12/25 15:05   Pulse 227 06/12/25 15:54   Resp 20 06/12/25 15:20   SpO2 89 % 06/12/25 15:54   Vitals shown include unfiled device data.        Post Anesthesia Care and Evaluation    Patient location during evaluation: PACU  Patient participation: complete - patient participated  Level of consciousness: awake  Pain scale: See nurse's notes for pain score.  Pain management: adequate    Airway patency: patent  Anesthetic complications: No anesthetic complications  PONV Status: none  Cardiovascular status: acceptable  Respiratory status: acceptable and spontaneous ventilation  Hydration status: acceptable    Comments: Patient seen and examined postoperatively; vital signs stable; SpO2 greater than or equal to 90%; cardiopulmonary status stable; nausea/vomiting adequately controlled; pain adequately controlled; no apparent anesthesia complications; patient discharged from anesthesia care when discharge criteria were met     Private car

## 2025-06-12 NOTE — OP NOTE
Orthopaedic & Hand Surgery  Wrist Fracture Operative Report  Dr. Sy Negrete  [phone]      PATIENT NAME: Dawson Lomax  MRN: [id number]  : 1960 AGE: 64 y.o. GENDER: male  DATE OF OPERATION: 2025  PREOPERATIVE DIAGNOSIS:   Right intraarticular distal radial fracture with three or more fragments  POSTOPERATIVE DIAGNOSIS:   Right intraarticular distal radial fracture with three or more fragments  OPERATION PERFORMED:   Open treatment of intraarticular distal radial fracture with three or more fragments (CPT 43338)  SURGEON: Sy Negrete MD, PhD  ANESTHESIA: Local and Sedation with Block  ASSISTANT: None  ESTIMATED BLOOD LOSS: <10 ml  SPONGE AND NEEDLE COUNT: Correct  INDICATIONS: The patient is as 64 y.o. male, who sustained a displaced distal radius fracture following a fall onto an outstretched hand. Given the unstable and intraarticular pattern, it was recommended that he undergo open reduction internal fixation. The risks of surgery were discussed and included Pain, Bleeding, Infection, Complications of anesthesia, Damage to blood vessels, nerves and other surrounding structures, Stiffness, Scar, Further procedures, Nonunion or malunion, Hardware Failure, and Unforeseen risks of surgery including stroke, heart stoppage or death. No guarantees were made. Following a thorough discussion, questions were solicited and answered to his satisfaction. Verbal and written consent were obtained prior to proceeding with surgery.    COMPONENTS:   [employer] Plate and Screws    PERTINENT FINDINGS:   Following fixation:  There was restoration of anatomic volar tilt and radial inclination   The fracture was stable taking the wrist through a range of motion and without crepitance.   The DRUJ was stable following internal fixation    TOURNIQUET TIME:   43 Minutes    DETAILS OF PROCEDURE:  The patient was met in the preoperative area. The site was marked. The consent and H&P were reviewed. The patient  was then wheeled back to the operative suite and transferred to the operative table with the operative limb extended onto a hand table. The patient submitted to anesthesia. A tourniquet was placed on the operative upper arm. Surgical alcohol was used to thoroughly clean the entire operative extremity. The operative arm was then prepped in the normal sterile fashion, which included Chloroprep and multiple layers of sterile drapes. After a surgical timeout in which the patient's identity, the surgical side/site, planned procedure, and the administration of preoperative antibiotics were confirmed, the limb was exsanguinated with an Esmarch bandage and the tourniquet was inflated.    The standard trans-FCR approach to the volar distal radius was performed. Incision was made in line with the FCR. FCR sheath and subsheath were divided. Care was taken to avoid damage to the palmar cutaneous branch of the median nerve. Floor of the FCR subsheath was entered. Pronator quadratus was identified and reflected off of the radius in an ulnarward fashion. This gave us full exposure to the fracture. We then elevated the brachioradialis off the distal radius fragment to allow for the reduction. Given that this was a subacute fracture, an extensive amount of mobilization was required.  We pronated the radius out of the way, and released the dorsal periosteum.    We selected a plate from the [employer] distal radius set and provisionally fixed it to the radius using nonlocking screws. Following this, a reduction maneuver was then performed with longitudinal traction volar flexion and ulnar translation. This brought together the radial styloid fragment, and with ligamentotaxis reduced the dorsal lunate facet fragment. The fracture was provisionally fixed with wires through the plate. Position was checked in the AP and lateral fluoroscopic views and found to be acceptable. We then placed locking pegs in the distal metaphyseal fragment,  taking 2 mm off of each measured length and position was checked in the AP, lateral and dorsal tangential fluoroscopic views. We were able to confirm that the screws were subchondral and not penetrating the joint. After this, the final fixation was carried out by placing additional locking screws into the shaft fragment. Final position was checked in the AP, lateral fluoroscopic views and found to have restored the distal radius anatomy. Screw and peg positions were acceptable. We then checked the DRUJ and it was found to be stable.    The wound was thoroughly irrigated. Pronator quadratus was repaired. Tourniquet was deflated at 43 min. Bleeding was controlled with a combination of direct pressure and bipolar cautery. The wound was closed in layers.    A sterile dressing and volar resting splint were applied and secured with an ACE bandage. Anesthesia was reversed without complication, the patient was transferred to the [facility] and taken to the recovery room in stable condition. Sponge and needle count were reported to me as correct. There were no immediate complications. Patient tolerated the procedure well.    Post Operative Plan:   Wound & dressing care  Keep the postoperative dressing in place until the first post-op visit  Once the postoperative dressing is removed, the patient may shower with regular soap and water.  No immersion until 1 week following suture removal  Weightbearing & ROM  Weight bearing is limited to no lifting greater than:  1 pound until 2 weeks postop  5 pounds until 6 weeks postop  25 pounds until 12 weeks postop.  Range of motion of the fingers and wrist is unrestricted and may be done as tolerated.  Therapy  A postsurgical follow-up, the patient will be provided with a volar forearm-based wrist brace, and will receive instructions for a home exercise program.  The patient will be encouraged to do unrestricted finger and wrist range of motion exercises at home.  Brace wear  Brace wear  should be full time for the first 2 weeks, except for bathing and ROM exercises.   The brace may be removed anytime for ROM exercises.  After 2 weeks, he should not wear the brace inside the home and only wear the brace when out of the house.  By 4 weeks, the orthosis should be discontinued completely.  Follow-up  Follow-up as scheduled with Dr. Negrete in 1-2 weeks. At that time, he will have his wounds checked and range of motion checked.     Sy Negrete MD, PhD  Orthopaedic & Hand Surgery  [facility]  [phone] - [place] [facility]  [phone] - [facility]

## 2025-06-27 DIAGNOSIS — E11.65 TYPE 2 DIABETES MELLITUS WITH HYPERGLYCEMIA, WITHOUT LONG-TERM CURRENT USE OF INSULIN: ICD-10-CM

## 2025-06-27 DIAGNOSIS — E78.2 MIXED HYPERLIPIDEMIA: ICD-10-CM

## 2025-06-27 DIAGNOSIS — I10 ESSENTIAL HYPERTENSION: ICD-10-CM

## 2025-06-27 DIAGNOSIS — I25.10 CORONARY ARTERY DISEASE INVOLVING NATIVE CORONARY ARTERY OF NATIVE HEART WITHOUT ANGINA PECTORIS: ICD-10-CM

## 2025-06-27 DIAGNOSIS — M10.9 GOUT, UNSPECIFIED CAUSE, UNSPECIFIED CHRONICITY, UNSPECIFIED SITE: Chronic | ICD-10-CM

## 2025-06-27 RX ORDER — HYDROCHLOROTHIAZIDE 12.5 MG/1
12.5 CAPSULE ORAL DAILY
Qty: 90 CAPSULE | Refills: 0 | Status: SHIPPED | OUTPATIENT
Start: 2025-06-27

## 2025-06-27 RX ORDER — HYDRALAZINE HYDROCHLORIDE 25 MG/1
25 TABLET, FILM COATED ORAL 2 TIMES DAILY
Qty: 180 TABLET | Refills: 0 | Status: SHIPPED | OUTPATIENT
Start: 2025-06-27

## 2025-06-29 RX ORDER — METFORMIN HYDROCHLORIDE 500 MG/1
1000 TABLET, EXTENDED RELEASE ORAL 2 TIMES DAILY
Qty: 180 TABLET | Refills: 0 | Status: SHIPPED | OUTPATIENT
Start: 2025-06-29

## 2025-07-01 ENCOUNTER — OFFICE VISIT (OUTPATIENT)
Dept: ONCOLOGY | Facility: CLINIC | Age: 65
End: 2025-07-01
Payer: COMMERCIAL

## 2025-07-01 ENCOUNTER — LAB (OUTPATIENT)
Dept: LAB | Facility: HOSPITAL | Age: 65
End: 2025-07-01
Payer: COMMERCIAL

## 2025-07-01 VITALS
BODY MASS INDEX: 31.82 KG/M2 | RESPIRATION RATE: 18 BRPM | TEMPERATURE: 98.1 F | HEIGHT: 66 IN | HEART RATE: 68 BPM | WEIGHT: 198 LBS | SYSTOLIC BLOOD PRESSURE: 130 MMHG | DIASTOLIC BLOOD PRESSURE: 60 MMHG | OXYGEN SATURATION: 95 %

## 2025-07-01 DIAGNOSIS — E53.8 B12 DEFICIENCY: ICD-10-CM

## 2025-07-01 DIAGNOSIS — D72.829 LEUKOCYTOSIS, UNSPECIFIED TYPE: Primary | ICD-10-CM

## 2025-07-01 LAB
ALBUMIN SERPL-MCNC: 4.6 G/DL (ref 3.5–5.2)
ALBUMIN/GLOB SERPL: 1.4 G/DL
ALP SERPL-CCNC: 93 U/L (ref 39–117)
ALT SERPL W P-5'-P-CCNC: 19 U/L (ref 1–41)
ANION GAP SERPL CALCULATED.3IONS-SCNC: 11.6 MMOL/L (ref 5–15)
AST SERPL-CCNC: 23 U/L (ref 1–40)
BASOPHILS # BLD AUTO: 0.04 10*3/MM3 (ref 0–0.2)
BASOPHILS NFR BLD AUTO: 0.3 % (ref 0–1.5)
BILIRUB SERPL-MCNC: 0.7 MG/DL (ref 0–1.2)
BUN SERPL-MCNC: 11 MG/DL (ref 8–23)
BUN/CREAT SERPL: 15.3 (ref 7–25)
CALCIUM SPEC-SCNC: 9.8 MG/DL (ref 8.6–10.5)
CHLORIDE SERPL-SCNC: 103 MMOL/L (ref 98–107)
CO2 SERPL-SCNC: 23.4 MMOL/L (ref 22–29)
CREAT SERPL-MCNC: 0.72 MG/DL (ref 0.76–1.27)
DEPRECATED RDW RBC AUTO: 50 FL (ref 37–54)
EGFRCR SERPLBLD CKD-EPI 2021: 102 ML/MIN/1.73
EOSINOPHIL # BLD AUTO: 0.22 10*3/MM3 (ref 0–0.4)
EOSINOPHIL NFR BLD AUTO: 1.8 % (ref 0.3–6.2)
ERYTHROCYTE [DISTWIDTH] IN BLOOD BY AUTOMATED COUNT: 14.3 % (ref 12.3–15.4)
ERYTHROCYTE [SEDIMENTATION RATE] IN BLOOD: 26 MM/HR (ref 0–20)
GLOBULIN UR ELPH-MCNC: 3.3 GM/DL
GLUCOSE SERPL-MCNC: 152 MG/DL (ref 65–99)
HCT VFR BLD AUTO: 48.8 % (ref 37.5–51)
HGB BLD-MCNC: 15.9 G/DL (ref 13–17.7)
LDH SERPL-CCNC: 137 U/L (ref 135–225)
LYMPHOCYTES # BLD AUTO: 4.94 10*3/MM3 (ref 0.7–3.1)
LYMPHOCYTES NFR BLD AUTO: 39.4 % (ref 19.6–45.3)
MCH RBC QN AUTO: 31.7 PG (ref 26.6–33)
MCHC RBC AUTO-ENTMCNC: 32.6 G/DL (ref 31.5–35.7)
MCV RBC AUTO: 97.2 FL (ref 79–97)
MONOCYTES # BLD AUTO: 1.29 10*3/MM3 (ref 0.1–0.9)
MONOCYTES NFR BLD AUTO: 10.3 % (ref 5–12)
NEUTROPHILS NFR BLD AUTO: 48.2 % (ref 42.7–76)
NEUTROPHILS NFR BLD AUTO: 6.04 10*3/MM3 (ref 1.7–7)
PATHOLOGY REVIEW: YES
PLATELET # BLD AUTO: 176 10*3/MM3 (ref 140–450)
PMV BLD AUTO: 10.4 FL (ref 6–12)
POTASSIUM SERPL-SCNC: 4.1 MMOL/L (ref 3.5–5.2)
PROT SERPL-MCNC: 7.9 G/DL (ref 6–8.5)
RBC # BLD AUTO: 5.02 10*6/MM3 (ref 4.14–5.8)
SODIUM SERPL-SCNC: 138 MMOL/L (ref 136–145)
URATE SERPL-MCNC: 4.3 MG/DL (ref 3.4–7)
VIT B12 BLD-MCNC: 688 PG/ML (ref 211–946)
WBC NRBC COR # BLD AUTO: 12.53 10*3/MM3 (ref 3.4–10.8)

## 2025-07-01 PROCEDURE — 85025 COMPLETE CBC W/AUTO DIFF WBC: CPT

## 2025-07-01 PROCEDURE — 84550 ASSAY OF BLOOD/URIC ACID: CPT | Performed by: INTERNAL MEDICINE

## 2025-07-01 PROCEDURE — 80053 COMPREHEN METABOLIC PANEL: CPT | Performed by: INTERNAL MEDICINE

## 2025-07-01 PROCEDURE — 36415 COLL VENOUS BLD VENIPUNCTURE: CPT

## 2025-07-01 PROCEDURE — 83615 LACTATE (LD) (LDH) ENZYME: CPT | Performed by: INTERNAL MEDICINE

## 2025-07-01 PROCEDURE — 86038 ANTINUCLEAR ANTIBODIES: CPT | Performed by: INTERNAL MEDICINE

## 2025-07-01 PROCEDURE — 82607 VITAMIN B-12: CPT | Performed by: INTERNAL MEDICINE

## 2025-07-01 PROCEDURE — 85652 RBC SED RATE AUTOMATED: CPT | Performed by: INTERNAL MEDICINE
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
## 2025-07-02 LAB
ANA SER QL: NEGATIVE
LAB AP CASE REPORT: NORMAL
PATH REPORT.FINAL DX SPEC: NORMAL

## 2025-07-05 LAB — METHYLMALONATE SERPL-SCNC: 137 NMOL/L (ref 0–378)

## 2025-07-05 RX ORDER — ATORVASTATIN CALCIUM 20 MG/1
20 TABLET, FILM COATED ORAL DAILY
Qty: 90 TABLET | Refills: 0 | Status: SHIPPED | OUTPATIENT
Start: 2025-07-05

## 2025-07-06 LAB
JAK2 P.V617F BLD/T QL: NORMAL
LAB DIRECTOR NAME PROVIDER: NORMAL
LABORATORY COMMENT REPORT: NORMAL

## 2025-07-08 LAB
ANNOTATION COMMENT IMP: NORMAL
ASSESSMENT OF LEUKOCYTES: NORMAL
CLINICAL INFO: NORMAL
GATING STRATEGY: NORMAL
IMMUNOPHENOTYPING STUDY: NORMAL
LABORATORY COMMENT REPORT: NORMAL
PATH INTERP SPEC-IMP: NORMAL
PATHOLOGIST NAME: NORMAL
SPECIMEN SOURCE: NORMAL
VIABLE CELLS NFR SPEC: NORMAL %

## 2025-07-11 LAB
CELLS ANALYZED: 200
CELLS COUNTED: 200
CHROM ANALY RESULT (ISCN): NORMAL
CLINICAL CYTOGENETICIST SPEC: NORMAL
DIAGNOSTIC IMP SPEC-IMP: NORMAL
SPECIMEN SOURCE: NORMAL

## 2025-07-29 ENCOUNTER — OFFICE VISIT (OUTPATIENT)
Dept: ENDOCRINOLOGY | Facility: CLINIC | Age: 65
End: 2025-07-29
Payer: COMMERCIAL

## 2025-07-29 VITALS
SYSTOLIC BLOOD PRESSURE: 140 MMHG | HEART RATE: 93 BPM | DIASTOLIC BLOOD PRESSURE: 80 MMHG | OXYGEN SATURATION: 97 % | BODY MASS INDEX: 31.27 KG/M2 | HEIGHT: 66 IN | WEIGHT: 194.6 LBS

## 2025-07-29 DIAGNOSIS — I10 ESSENTIAL HYPERTENSION: ICD-10-CM

## 2025-07-29 DIAGNOSIS — I25.10 CORONARY ARTERY DISEASE INVOLVING NATIVE CORONARY ARTERY OF NATIVE HEART WITHOUT ANGINA PECTORIS: ICD-10-CM

## 2025-07-29 DIAGNOSIS — E66.812 CLASS 2 SEVERE OBESITY DUE TO EXCESS CALORIES WITH SERIOUS COMORBIDITY AND BODY MASS INDEX (BMI) OF 35.0 TO 35.9 IN ADULT: ICD-10-CM

## 2025-07-29 DIAGNOSIS — E66.01 CLASS 2 SEVERE OBESITY DUE TO EXCESS CALORIES WITH SERIOUS COMORBIDITY AND BODY MASS INDEX (BMI) OF 35.0 TO 35.9 IN ADULT: ICD-10-CM

## 2025-07-29 DIAGNOSIS — E11.65 TYPE 2 DIABETES MELLITUS WITH HYPERGLYCEMIA, WITHOUT LONG-TERM CURRENT USE OF INSULIN: Primary | ICD-10-CM

## 2025-07-29 DIAGNOSIS — E78.2 MIXED HYPERLIPIDEMIA: ICD-10-CM

## 2025-07-29 PROCEDURE — 99214 OFFICE O/P EST MOD 30 MIN: CPT | Performed by: INTERNAL MEDICINE

## 2025-07-29 RX ORDER — HYDROCHLOROTHIAZIDE 12.5 MG/1
CAPSULE ORAL
Qty: 2 EACH | Refills: 5 | Status: SHIPPED | OUTPATIENT
Start: 2025-07-29

## 2025-07-29 RX ORDER — LOSARTAN POTASSIUM 50 MG/1
50 TABLET ORAL 2 TIMES DAILY
Qty: 180 TABLET | Refills: 0 | Status: SHIPPED | OUTPATIENT
Start: 2025-07-29

## 2025-07-29 RX ORDER — METFORMIN HYDROCHLORIDE 500 MG/1
1000 TABLET, EXTENDED RELEASE ORAL 2 TIMES DAILY
Qty: 180 TABLET | Refills: 0 | Status: SHIPPED | OUTPATIENT
Start: 2025-07-29

## 2025-07-29 RX ORDER — ATORVASTATIN CALCIUM 20 MG/1
20 TABLET, FILM COATED ORAL DAILY
Qty: 90 TABLET | Refills: 0 | Status: SHIPPED | OUTPATIENT
Start: 2025-07-29

## 2025-07-29 RX ORDER — INSULIN DEGLUDEC 100 U/ML
15 INJECTION, SOLUTION SUBCUTANEOUS DAILY
Qty: 15 ML | Refills: 6 | Status: SHIPPED | OUTPATIENT
Start: 2025-07-29

## 2025-07-29 RX ORDER — TIRZEPATIDE 2.5 MG/.5ML
2.5 INJECTION, SOLUTION SUBCUTANEOUS WEEKLY
Start: 2025-07-29

## 2025-07-29 NOTE — PATIENT INSTRUCTIONS
Increase Tresiba to 15 units subcu daily  Start Mounjaro at 2.5 mg subcu weekly  Continue rest of the medication  Start using freestyle petra sensor system to monitor blood sugars and adjust the insulin dose  Continue to work on your diet and activity  Labs before follow-up.
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
## 2025-08-11 ENCOUNTER — SPECIALTY PHARMACY (OUTPATIENT)
Dept: ENDOCRINOLOGY | Facility: CLINIC | Age: 65
End: 2025-08-11
Payer: COMMERCIAL

## 2025-08-11 RX ORDER — KETOROLAC TROMETHAMINE 30 MG/ML
1 INJECTION, SOLUTION INTRAMUSCULAR; INTRAVENOUS CONTINUOUS
Qty: 1 EACH | Refills: 0 | Status: SHIPPED | OUTPATIENT
Start: 2025-08-11

## 2025-08-11 RX ORDER — SEMAGLUTIDE 0.68 MG/ML
0.5 INJECTION, SOLUTION SUBCUTANEOUS WEEKLY
Qty: 9 ML | Refills: 1 | Status: SHIPPED | OUTPATIENT
Start: 2025-08-11